# Patient Record
Sex: MALE | Race: BLACK OR AFRICAN AMERICAN | NOT HISPANIC OR LATINO | Employment: OTHER | URBAN - METROPOLITAN AREA
[De-identification: names, ages, dates, MRNs, and addresses within clinical notes are randomized per-mention and may not be internally consistent; named-entity substitution may affect disease eponyms.]

---

## 2017-01-20 ENCOUNTER — LAB VISIT (OUTPATIENT)
Dept: LAB | Facility: HOSPITAL | Age: 77
End: 2017-01-20
Attending: NUCLEAR MEDICINE
Payer: MEDICARE

## 2017-01-20 DIAGNOSIS — I50.9 CHF (NYHA CLASS III, ACC/AHA STAGE C): Chronic | ICD-10-CM

## 2017-01-20 DIAGNOSIS — D63.8 ANEMIA IN CHRONIC ILLNESS: ICD-10-CM

## 2017-01-20 DIAGNOSIS — I25.5 ISCHEMIC CARDIOMYOPATHY: ICD-10-CM

## 2017-01-20 DIAGNOSIS — D47.2 MGUS (MONOCLONAL GAMMOPATHY OF UNKNOWN SIGNIFICANCE): ICD-10-CM

## 2017-01-20 DIAGNOSIS — N18.30 ANEMIA, CHRONIC RENAL FAILURE, STAGE 3 (MODERATE): ICD-10-CM

## 2017-01-20 DIAGNOSIS — D63.1 ANEMIA, CHRONIC RENAL FAILURE, STAGE 3 (MODERATE): ICD-10-CM

## 2017-01-20 LAB
ALBUMIN SERPL BCP-MCNC: 3.9 G/DL
ALP SERPL-CCNC: 41 U/L
ALT SERPL W/O P-5'-P-CCNC: 14 U/L
ANION GAP SERPL CALC-SCNC: 10 MMOL/L
ANION GAP SERPL CALC-SCNC: 10 MMOL/L
AST SERPL-CCNC: 20 U/L
BASOPHILS # BLD AUTO: 0.01 K/UL
BASOPHILS NFR BLD: 0.2 %
BILIRUB SERPL-MCNC: 0.7 MG/DL
BUN SERPL-MCNC: 12 MG/DL
BUN SERPL-MCNC: 12 MG/DL
CALCIUM SERPL-MCNC: 9.5 MG/DL
CALCIUM SERPL-MCNC: 9.5 MG/DL
CHLORIDE SERPL-SCNC: 109 MMOL/L
CHLORIDE SERPL-SCNC: 109 MMOL/L
CO2 SERPL-SCNC: 24 MMOL/L
CO2 SERPL-SCNC: 24 MMOL/L
CREAT SERPL-MCNC: 1.2 MG/DL
CREAT SERPL-MCNC: 1.2 MG/DL
DIFFERENTIAL METHOD: ABNORMAL
EOSINOPHIL # BLD AUTO: 0.1 K/UL
EOSINOPHIL NFR BLD: 1.5 %
ERYTHROCYTE [DISTWIDTH] IN BLOOD BY AUTOMATED COUNT: 12.9 %
EST. GFR  (AFRICAN AMERICAN): >60 ML/MIN/1.73 M^2
EST. GFR  (AFRICAN AMERICAN): >60 ML/MIN/1.73 M^2
EST. GFR  (NON AFRICAN AMERICAN): 58 ML/MIN/1.73 M^2
EST. GFR  (NON AFRICAN AMERICAN): 58 ML/MIN/1.73 M^2
GLUCOSE SERPL-MCNC: 118 MG/DL
GLUCOSE SERPL-MCNC: 118 MG/DL
HCT VFR BLD AUTO: 32.2 %
HGB BLD-MCNC: 10.7 G/DL
LYMPHOCYTES # BLD AUTO: 2.1 K/UL
LYMPHOCYTES NFR BLD: 44.9 %
MCH RBC QN AUTO: 30.7 PG
MCHC RBC AUTO-ENTMCNC: 33.2 %
MCV RBC AUTO: 93 FL
MONOCYTES # BLD AUTO: 0.4 K/UL
MONOCYTES NFR BLD: 8.5 %
NEUTROPHILS # BLD AUTO: 2.1 K/UL
NEUTROPHILS NFR BLD: 44.9 %
PLATELET # BLD AUTO: 196 K/UL
PMV BLD AUTO: 8.3 FL
POTASSIUM SERPL-SCNC: 3.5 MMOL/L
POTASSIUM SERPL-SCNC: 3.5 MMOL/L
PROT SERPL-MCNC: 7.8 G/DL
RBC # BLD AUTO: 3.48 M/UL
SODIUM SERPL-SCNC: 143 MMOL/L
SODIUM SERPL-SCNC: 143 MMOL/L
WBC # BLD AUTO: 4.57 K/UL

## 2017-01-20 PROCEDURE — 84165 PROTEIN E-PHORESIS SERUM: CPT | Mod: 26,,, | Performed by: PATHOLOGY

## 2017-01-20 PROCEDURE — 86334 IMMUNOFIX E-PHORESIS SERUM: CPT | Mod: 26,,, | Performed by: PATHOLOGY

## 2017-01-20 PROCEDURE — 84165 PROTEIN E-PHORESIS SERUM: CPT

## 2017-01-20 PROCEDURE — 85025 COMPLETE CBC W/AUTO DIFF WBC: CPT

## 2017-01-20 PROCEDURE — 83520 IMMUNOASSAY QUANT NOS NONAB: CPT

## 2017-01-20 PROCEDURE — 36415 COLL VENOUS BLD VENIPUNCTURE: CPT

## 2017-01-20 PROCEDURE — 86334 IMMUNOFIX E-PHORESIS SERUM: CPT

## 2017-01-20 PROCEDURE — 80053 COMPREHEN METABOLIC PANEL: CPT

## 2017-01-23 LAB
ALBUMIN SERPL ELPH-MCNC: 3.99 G/DL
ALPHA1 GLOB SERPL ELPH-MCNC: 0.28 G/DL
ALPHA2 GLOB SERPL ELPH-MCNC: 0.71 G/DL
B-GLOBULIN SERPL ELPH-MCNC: 0.73 G/DL
GAMMA GLOB SERPL ELPH-MCNC: 1.8 G/DL
INTERPRETATION SERPL IFE-IMP: NORMAL
KAPPA LC SER QL IA: 2.33 MG/DL
KAPPA LC/LAMBDA SER IA: 0.62
LAMBDA LC SER QL IA: 3.74 MG/DL
PATHOLOGIST INTERPRETATION IFE: NORMAL
PATHOLOGIST INTERPRETATION SPE: NORMAL
PROT SERPL-MCNC: 7.5 G/DL

## 2017-01-26 ENCOUNTER — OFFICE VISIT (OUTPATIENT)
Dept: HEMATOLOGY/ONCOLOGY | Facility: CLINIC | Age: 77
End: 2017-01-26
Payer: MEDICARE

## 2017-01-26 VITALS
WEIGHT: 200.63 LBS | TEMPERATURE: 96 F | OXYGEN SATURATION: 90 % | HEIGHT: 67 IN | DIASTOLIC BLOOD PRESSURE: 82 MMHG | RESPIRATION RATE: 20 BRPM | BODY MASS INDEX: 31.49 KG/M2 | SYSTOLIC BLOOD PRESSURE: 138 MMHG | HEART RATE: 67 BPM

## 2017-01-26 DIAGNOSIS — D63.8 ANEMIA IN CHRONIC ILLNESS: ICD-10-CM

## 2017-01-26 DIAGNOSIS — D47.2 MGUS (MONOCLONAL GAMMOPATHY OF UNKNOWN SIGNIFICANCE): Primary | ICD-10-CM

## 2017-01-26 PROCEDURE — 99214 OFFICE O/P EST MOD 30 MIN: CPT | Mod: S$PBB,,, | Performed by: INTERNAL MEDICINE

## 2017-01-26 PROCEDURE — 99999 PR PBB SHADOW E&M-EST. PATIENT-LVL III: CPT | Mod: PBBFAC,,, | Performed by: INTERNAL MEDICINE

## 2017-01-26 PROCEDURE — 99213 OFFICE O/P EST LOW 20 MIN: CPT | Mod: PBBFAC | Performed by: INTERNAL MEDICINE

## 2017-01-26 NOTE — PROGRESS NOTES
Reason for visit: Follow up for anemia/MGUS    HPI:   The patient is a 76-year-old  male who presents to the hematology oncology clinic today for follow up of his chronic anemia.  Today the patient reports that overall he feels ok except for his chronic arthritic pain in his lower back which bothers him from time to time. He denies any chest pain or shortness of breath. He denies any bowel or urinary complaints. He denies any fever, chills or night sweats. He denies any loss of appetite or unintentional weight loss. He denies any melena, hematochezia, hematemesis, hemoptysis or hematuria. He has no other specific complaints today.   I have reviewed all of the patient's interval clinical history available in EPIC and have utilized this in my evaluation and management recommendations today.    PAST MEDICAL HISTORY:   1. Glaucoma  2. Asthma  3. Prostate adenocarcinoma diagnosed in 2001.   4. Erectile dysfunction  5. L5-S1 radiculopathy  6. SI joint dysfunction  7. L3-L4 disc herniation  8. Osteoarthritis  9. Nonischemic cardiomyopathy  10. Nonobstructive coronary artery disease    SURGICAL HISTORY:   1. Cholecystectomy  2. Appendectomy  3. Radical prostatectomy  4. Right total hip replacement in 2008  5. AICD placement in oct 2012    FAMILY HISTORY: The patient's mother  from complications related to an unknown type of cancer at the age of 86. She was diagnosed at the age of 84. He denies any other immediate family members with cancer or bleeding/clotting disorders.    SOCIAL HISTORY: He reports that he was a light smoker and quit more than 26 years ago. He reports that he was a heavy drinker of alcohol for about 10-15 years and quit more than 27 years ago. He denies using any recreational drugs. He retired in . He is . He lives in [place], [place]. He has one daughter and she lives in [place].    ALLERGIES: Reviewed on medication card.    MEDICATIONS: [Medcard  has been reviewed and/or reconciled.]    REVIEW OF SYSTEMS:   GENERAL: [No fevers, chills or sweats. No weight loss or loss of appetite.] He does report chronic fatigue.  HEENT: [No blurred vision, tinnitus, nasal discharge, sorethroat or dysphagia.]  HEART: [No chest pain, palpitations or shortness of breath.]   LUNGS: [No cough, hemoptysis or breathing problems.]  ABDOMEN: [No abdominal pain, nausea, vomiting, diarrhea, constipation or melena.]  GENITOURINARY: [No dysuria, bleeding or malodorous discharge.]  NEURO: [No headache, dizziness or vertigo.]  HEMATOLOGY: [No easy bruising, spontaneous bleeding or blood clots in the past].  MUSCULOSKELETAL: [No myalgias or bone pains.] He does have a history of osteoarthritis.  SKIN: [No rashes or skin lesions.]  PSYCHIATRY: [No depression or anxiety.]    PHYSICAL EXAMINATION:   VS: Reviewed on nurse's notes.  APPEARANCE: The patient is a well-developed, well-nourished and well-groomed after American male who appears in no acute distress. He uses a cane for assistance with ambulation.   HEENT: No scleral icterus. Both external auditory canals clear. No oral ulcers, lesions. Throat clear  HEAD: No sinus tenderness. Possible sebaceous cyst on right frontal scalp.  NECK: Supple. No palpable lymphadenopathy. Thyroid non-tender, no palpable masses  CHEST: Breath sounds clear bilaterally. No rales. No rhonchi. Unlabored respirations.  CARDIOVASCULAR: Normal S1, S2. Normal rate. Regular rhythm. Grade 2/6 systolic ejection murmur is noted.  ABDOMEN: Bowel sounds normal. No tenderness. No abdominal distention. No hepatomegaly. No splenomegaly.  LYMPHATIC: No palpable supraclavicular, axillary nodes  EXTREMITIES: No clubbing, cyanosis, edema  SKIN: No lesions. No petechiae. No ecchymoses. No induration or nodules  NEUROLOGIC: No focal findings. Alert & Oriented x 3. Mood appropriate to affect    LABS:   Reviewed    IMAGING:  Reviewed    IMPRESSION:  1. Normocytic anemia  2.  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 Chronic low back pain    PLAN:  1. I had a detailed discussion with the patient today with regard to the results of his recent labwork. This shows stable Hb/Hct. Paraprotein levels are stable. He did have a bone marrow biopsy in sep 2014. There was no evidence of multiple myeloma/plasma cell dyscrasis. There was no evidence of MDS/leukemia or lymphoma  2. At this time we will continue with conservative management with close follow up with regard to his anemia and MGUS  3. He will be schedule to see his primary care physician for all routine health care maintenance including discussing screening colonoscopy.    Follow up in 6 months with labs 1 week before visit. He knows to call sooner for any new problems or questions.    Víctor La MD

## 2017-02-06 ENCOUNTER — OFFICE VISIT (OUTPATIENT)
Dept: INTERNAL MEDICINE | Facility: CLINIC | Age: 77
End: 2017-02-06
Payer: MEDICARE

## 2017-02-06 VITALS
DIASTOLIC BLOOD PRESSURE: 76 MMHG | SYSTOLIC BLOOD PRESSURE: 132 MMHG | BODY MASS INDEX: 32.01 KG/M2 | TEMPERATURE: 99 F | HEIGHT: 67 IN | WEIGHT: 203.94 LBS | HEART RATE: 84 BPM | OXYGEN SATURATION: 97 %

## 2017-02-06 DIAGNOSIS — I10 ESSENTIAL HYPERTENSION: ICD-10-CM

## 2017-02-06 DIAGNOSIS — I50.9 CHF (NYHA CLASS III, ACC/AHA STAGE C): Chronic | ICD-10-CM

## 2017-02-06 DIAGNOSIS — H40.9 GLAUCOMA, UNSPECIFIED GLAUCOMA, UNSPECIFIED LATERALITY: ICD-10-CM

## 2017-02-06 DIAGNOSIS — Z00.00 ROUTINE GENERAL MEDICAL EXAMINATION AT A HEALTH CARE FACILITY: Primary | ICD-10-CM

## 2017-02-06 DIAGNOSIS — R73.9 HYPERGLYCEMIA: ICD-10-CM

## 2017-02-06 DIAGNOSIS — J06.9 ACUTE URI: ICD-10-CM

## 2017-02-06 DIAGNOSIS — D47.2 MGUS (MONOCLONAL GAMMOPATHY OF UNKNOWN SIGNIFICANCE): ICD-10-CM

## 2017-02-06 DIAGNOSIS — E78.5 HYPERLIPIDEMIA, UNSPECIFIED HYPERLIPIDEMIA TYPE: ICD-10-CM

## 2017-02-06 DIAGNOSIS — D63.8 ANEMIA IN CHRONIC ILLNESS: ICD-10-CM

## 2017-02-06 PROCEDURE — 99999 PR PBB SHADOW E&M-EST. PATIENT-LVL III: CPT | Mod: PBBFAC,,, | Performed by: FAMILY MEDICINE

## 2017-02-06 PROCEDURE — 99213 OFFICE O/P EST LOW 20 MIN: CPT | Mod: PBBFAC | Performed by: FAMILY MEDICINE

## 2017-02-06 PROCEDURE — 99214 OFFICE O/P EST MOD 30 MIN: CPT | Mod: S$PBB,,, | Performed by: FAMILY MEDICINE

## 2017-02-06 NOTE — PROGRESS NOTES
Subjective:       Patient ID: Curtis Landry is a 76 y.o. male.    Chief Complaint: Annual Exam    HPI Comments: Patient presents to clinic today for annual physical exam/f/u chronic conditions. Patient is otherwise without concerns today.      Review of Systems   Constitutional: Negative for chills, fatigue, fever and unexpected weight change.   HENT: Positive for dental problem (loose dentures). Negative for congestion, ear pain, hearing loss, rhinorrhea and trouble swallowing.    Eyes: Positive for visual disturbance (chronic, stable). Negative for pain.   Respiratory: Positive for shortness of breath (with exertion, chronic, stable, reports Cardiology aware). Negative for cough.    Cardiovascular: Negative for chest pain, palpitations and leg swelling.   Gastrointestinal: Negative for abdominal distention, abdominal pain, blood in stool, constipation, diarrhea, nausea and vomiting.   Genitourinary: Negative for difficulty urinating, scrotal swelling and testicular pain.   Musculoskeletal: Negative for arthralgias and myalgias.   Skin: Negative for rash.   Neurological: Negative for dizziness, weakness, numbness and headaches.   Hematological: Negative for adenopathy. Does not bruise/bleed easily.   Psychiatric/Behavioral: Negative for dysphoric mood and sleep disturbance. The patient is not nervous/anxious.        Objective:      Physical Exam   Constitutional: He is oriented to person, place, and time. He appears well-developed and well-nourished. No distress.   HENT:   Head: Normocephalic and atraumatic.   Right Ear: Tympanic membrane normal.   Left Ear: Tympanic membrane normal.   Nose: Mucosal edema and rhinorrhea present.   Mouth/Throat: Uvula is midline, oropharynx is clear and moist and mucous membranes are normal.   Eyes: Conjunctivae and EOM are normal. Pupils are equal, round, and reactive to light. No scleral icterus.   Cardiovascular: Normal rate and regular rhythm.  Exam reveals no gallop and no  friction rub.    No murmur heard.  Pulmonary/Chest: Effort normal and breath sounds normal.   Musculoskeletal: He exhibits no edema.   Neurological: He is alert and oriented to person, place, and time. No cranial nerve deficit. Gait normal.   Psychiatric: He has a normal mood and affect.   Vitals reviewed.      Assessment:       1. Routine general medical examination at a health care facility    2. Essential hypertension    3. Hyperlipidemia, unspecified hyperlipidemia type    4. CHF (NYHA class III, ACC/AHA stage C)    5. Anemia in chronic illness    6. MGUS (monoclonal gammopathy of unknown significance)    7. Glaucoma, unspecified glaucoma, unspecified laterality    8. Hyperglycemia    9. Acute URI        Plan:   Curtis GRAVES was seen today for annual exam.    Diagnoses and all orders for this visit:    Routine general medical examination at a health care facility    Essential hypertension  Comments:  controlled on current meds  Orders:  -     TSH; Future    Hyperlipidemia, unspecified hyperlipidemia type  Comments:  status pending labs  Orders:  -     Cancel: Comprehensive metabolic panel; Future  -     Lipid panel; Future    CHF (NYHA class III, ACC/AHA stage C)  Comments:  followed by Dr. Boyd, Cardiology    Anemia in chronic illness  Comments:  followed by Dr. La, Hem/Onc    MGUS (monoclonal gammopathy of unknown significance)  Comments:  followed by Dr. La, Hem/Onc    Glaucoma, unspecified glaucoma, unspecified laterality  Comments:  followed by outside ophthalmology    Hyperglycemia  Comments:  check a1c to rule out diabetes  Orders:  -     Hemoglobin A1c; Future    Acute URI  Comments:  symptoms x 1 day, no fever/chills, f/u if worse/persistent      - Health Maintenance reviewed/updated. Advised he can obtain Tdap and flu at pharmacy when his cold resolves. Patient expressed understanding.

## 2017-02-10 ENCOUNTER — HOSPITAL ENCOUNTER (EMERGENCY)
Facility: HOSPITAL | Age: 77
Discharge: HOME | End: 2017-02-10
Attending: EMERGENCY MEDICINE
Payer: MEDICARE

## 2017-02-10 VITALS
RESPIRATION RATE: 18 BRPM | HEIGHT: 65 IN | SYSTOLIC BLOOD PRESSURE: 162 MMHG | TEMPERATURE: 98 F | WEIGHT: 200 LBS | BODY MASS INDEX: 33.32 KG/M2 | DIASTOLIC BLOOD PRESSURE: 83 MMHG | OXYGEN SATURATION: 98 % | HEART RATE: 76 BPM

## 2017-02-10 DIAGNOSIS — D63.8 ANEMIA IN CHRONIC ILLNESS: ICD-10-CM

## 2017-02-10 DIAGNOSIS — I50.9 CHF (NYHA CLASS III, ACC/AHA STAGE C): Primary | Chronic | ICD-10-CM

## 2017-02-10 DIAGNOSIS — J20.9 ACUTE BRONCHITIS, UNSPECIFIED ORGANISM: ICD-10-CM

## 2017-02-10 DIAGNOSIS — I25.5 ISCHEMIC CARDIOMYOPATHY: ICD-10-CM

## 2017-02-10 LAB
ALBUMIN SERPL BCP-MCNC: 3.9 G/DL
ALP SERPL-CCNC: 46 U/L
ALT SERPL W/O P-5'-P-CCNC: 21 U/L
ANION GAP SERPL CALC-SCNC: 10 MMOL/L
AST SERPL-CCNC: 28 U/L
BASOPHILS # BLD AUTO: 0.03 K/UL
BASOPHILS NFR BLD: 0.7 %
BILIRUB SERPL-MCNC: 0.6 MG/DL
BNP SERPL-MCNC: 31 PG/ML
BUN SERPL-MCNC: 11 MG/DL
CALCIUM SERPL-MCNC: 10.1 MG/DL
CHLORIDE SERPL-SCNC: 105 MMOL/L
CO2 SERPL-SCNC: 25 MMOL/L
CREAT SERPL-MCNC: 1.1 MG/DL
DIFFERENTIAL METHOD: ABNORMAL
EOSINOPHIL # BLD AUTO: 0.1 K/UL
EOSINOPHIL NFR BLD: 2.6 %
ERYTHROCYTE [DISTWIDTH] IN BLOOD BY AUTOMATED COUNT: 12.8 %
EST. GFR  (AFRICAN AMERICAN): >60 ML/MIN/1.73 M^2
EST. GFR  (NON AFRICAN AMERICAN): >60 ML/MIN/1.73 M^2
FLUAV AG SPEC QL IA: NEGATIVE
FLUBV AG SPEC QL IA: NEGATIVE
GLUCOSE SERPL-MCNC: 95 MG/DL
HCT VFR BLD AUTO: 32.8 %
HGB BLD-MCNC: 11.2 G/DL
LYMPHOCYTES # BLD AUTO: 2.3 K/UL
LYMPHOCYTES NFR BLD: 53.8 %
MCH RBC QN AUTO: 30.6 PG
MCHC RBC AUTO-ENTMCNC: 34.1 %
MCV RBC AUTO: 90 FL
MONOCYTES # BLD AUTO: 0.5 K/UL
MONOCYTES NFR BLD: 12 %
NEUTROPHILS # BLD AUTO: 1.3 K/UL
NEUTROPHILS NFR BLD: 30.9 %
PLATELET # BLD AUTO: 169 K/UL
PMV BLD AUTO: 8.3 FL
POTASSIUM SERPL-SCNC: 4 MMOL/L
PROT SERPL-MCNC: 8.5 G/DL
RBC # BLD AUTO: 3.66 M/UL
SODIUM SERPL-SCNC: 140 MMOL/L
SPECIMEN SOURCE: NORMAL
TROPONIN I SERPL DL<=0.01 NG/ML-MCNC: 0.01 NG/ML
WBC # BLD AUTO: 4.26 K/UL

## 2017-02-10 PROCEDURE — 93010 ELECTROCARDIOGRAM REPORT: CPT | Mod: ,,, | Performed by: INTERNAL MEDICINE

## 2017-02-10 PROCEDURE — 87400 INFLUENZA A/B EACH AG IA: CPT | Mod: 59

## 2017-02-10 PROCEDURE — 93005 ELECTROCARDIOGRAM TRACING: CPT

## 2017-02-10 PROCEDURE — 63600175 PHARM REV CODE 636 W HCPCS: Performed by: EMERGENCY MEDICINE

## 2017-02-10 PROCEDURE — 84484 ASSAY OF TROPONIN QUANT: CPT

## 2017-02-10 PROCEDURE — 85025 COMPLETE CBC W/AUTO DIFF WBC: CPT

## 2017-02-10 PROCEDURE — 80053 COMPREHEN METABOLIC PANEL: CPT

## 2017-02-10 PROCEDURE — 83880 ASSAY OF NATRIURETIC PEPTIDE: CPT

## 2017-02-10 PROCEDURE — 96374 THER/PROPH/DIAG INJ IV PUSH: CPT

## 2017-02-10 PROCEDURE — 99284 EMERGENCY DEPT VISIT MOD MDM: CPT | Mod: 25

## 2017-02-10 PROCEDURE — 25000003 PHARM REV CODE 250: Performed by: EMERGENCY MEDICINE

## 2017-02-10 RX ORDER — METHYLPREDNISOLONE SOD SUCC 125 MG
125 VIAL (EA) INJECTION
Status: COMPLETED | OUTPATIENT
Start: 2017-02-10 | End: 2017-02-10

## 2017-02-10 RX ORDER — DOXYCYCLINE HYCLATE 100 MG
100 TABLET ORAL 2 TIMES DAILY
Qty: 20 TABLET | Refills: 0 | Status: SHIPPED | OUTPATIENT
Start: 2017-02-10 | End: 2017-06-30

## 2017-02-10 RX ORDER — FUROSEMIDE 10 MG/ML
20 INJECTION INTRAMUSCULAR; INTRAVENOUS
Status: DISCONTINUED | OUTPATIENT
Start: 2017-02-10 | End: 2017-02-10

## 2017-02-10 RX ORDER — BENZONATATE 100 MG/1
100 CAPSULE ORAL 3 TIMES DAILY PRN
Qty: 20 CAPSULE | Refills: 0 | Status: SHIPPED | OUTPATIENT
Start: 2017-02-10 | End: 2017-02-20

## 2017-02-10 RX ORDER — FUROSEMIDE 40 MG/1
40 TABLET ORAL
Status: COMPLETED | OUTPATIENT
Start: 2017-02-10 | End: 2017-02-10

## 2017-02-10 RX ORDER — BENZONATATE 100 MG/1
100 CAPSULE ORAL 3 TIMES DAILY PRN
Qty: 20 CAPSULE | Refills: 0 | Status: SHIPPED | OUTPATIENT
Start: 2017-02-10 | End: 2017-02-14 | Stop reason: SDUPTHER

## 2017-02-10 RX ORDER — METHYLPREDNISOLONE 4 MG/1
TABLET ORAL
Qty: 1 PACKAGE | Refills: 0 | Status: SHIPPED | OUTPATIENT
Start: 2017-02-10 | End: 2017-03-03

## 2017-02-10 RX ADMIN — METHYLPREDNISOLONE SODIUM SUCCINATE 125 MG: 125 INJECTION, POWDER, FOR SOLUTION INTRAMUSCULAR; INTRAVENOUS at 12:02

## 2017-02-10 RX ADMIN — FUROSEMIDE 40 MG: 40 TABLET ORAL at 02:02

## 2017-02-10 NOTE — ED PROVIDER NOTES
SCRIBE #1 NOTE: I, Staci De Pazlonnie, am scribing for, and in the presence of, Alessandro Coffey MD. I have scribed the entire note.      History      Chief Complaint   Patient presents with    Chest Pain     started past week. Reports slight SOB with cough       Review of patient's allergies indicates:   Allergen Reactions    No known drug allergies         HPI   HPI    2/10/2017, 11:36 AM   History obtained from the patient      History of Present Illness: Curtis Landry is a 76 y.o. male patient with a HX of CHF who presents to the Emergency Department for SOB which onset gradually 3 days ago. Pt reports having a cold onset 3 days ago. Symptoms are constant and moderate in severity. No mitigating or exacerbating factors reported. Associated sxs include cough, and abdominal pain (secondary to cough). Pt rates abd pain an 8/10 in severity when coughing. Patient denies any recent travel, long car trips, fever, CP, nausea, emesis, extremity weakness/numbness, leg pain/swelling, and all other sxs at this time. Pt denies Hx of diabetes. No further complaints or concerns at this time.         Arrival mode: Personal vehicle      PCP: Liz Hall MD       Past Medical History:  Past Medical History   Diagnosis Date    Arthritis     CHF (congestive heart failure)     Glaucoma (increased eye pressure)      Followed by outside opthalmology    HTN (hypertension)     Hyperlipidemia     Prostate cancer        Past Surgical History:  Past Surgical History   Procedure Laterality Date    Gallbladder surgery      Total hip arthroplasty      Cardiac pacemaker placement      Cardiac defibrillator placement      Prostatectomy      Appendectomy      Cardiac pacemaker placement           Family History:  Family History   Problem Relation Age of Onset    Cancer Mother        Social History:  Social History     Social History Main Topics    Smoking status: Former Smoker     Years: 20.00     Quit date: 1/1/1980     Smokeless tobacco: Never Used    Alcohol use No    Drug use: No    Sexual activity: Unknown       ROS   Review of Systems   Constitutional: Negative for fever.        (-) recent travel  (-) long car trips   HENT: Negative for sore throat.    Respiratory: Positive for cough and shortness of breath.    Cardiovascular: Negative for chest pain and leg swelling.   Gastrointestinal: Positive for abdominal pain. Negative for nausea and vomiting.   Genitourinary: Negative for dysuria.   Musculoskeletal: Negative for back pain.   Skin: Negative for rash.   Neurological: Negative for weakness and numbness.   Hematological: Does not bruise/bleed easily.   All other systems reviewed and are negative.      Physical Exam    Initial Vitals   BP Pulse Resp Temp SpO2   02/10/17 1003 02/10/17 1003 02/10/17 1003 02/10/17 1003 02/10/17 1003   174/78 78 18 98 °F (36.7 °C) 95 %      Physical Exam  Nursing Notes and Vital Signs Reviewed.  Constitutional: Patient is in no apparent distress. Awake and alert. Well-developed and well-nourished.  Head: Atraumatic. Normocephalic.  Eyes: PERRL. EOM intact. Conjunctivae are not pale. No scleral icterus.  ENT: Mucous membranes are moist. Oropharynx is clear and symmetric.    Neck: Supple. Full ROM. No lymphadenopathy.  Cardiovascular: Regular rate. Regular rhythm. No murmurs, rubs, or gallops. Distal pulses are 2+ and symmetric.  Pulmonary/Chest: No respiratory distress. No wheezing, or rales. Diffuse rhonchi. Significant crackles bilaterally skilled nursing up.  Abdominal: Soft and non-distended.  There is no tenderness.  No rebound, guarding, or rigidity. Good bowel sounds.  Genitourinary: No CVA tenderness  Musculoskeletal: Moves all extremities. No obvious deformities. No edema. No calf tenderness.  Skin: Warm and dry.  Neurological:  Alert, awake, and appropriate.  Normal speech.  No acute focal neurological deficits are appreciated.  Psychiatric: Normal affect. Good eye contact. Appropriate in  "content.      ED Course    Procedures  ED Vital Signs:  Vitals:    02/10/17 1003 02/10/17 1202 02/10/17 1217 02/10/17 1304   BP: (!) 174/78 (!) 157/81 (!) 151/84 (!) 155/87   Pulse: 78 70 68 69   Resp: 18 (!) 21 20 19   Temp: 98 °F (36.7 °C)      TempSrc: Oral      SpO2: 95% 99% 99% 99%   Weight: 90.7 kg (200 lb)      Height: 5' 5" (1.651 m)       02/10/17 1332 02/10/17 1347   BP: (!) 161/93 (!) 162/83   Pulse: 69 76   Resp: 17 18   Temp:     TempSrc:     SpO2: 99% 98%   Weight:     Height:         Abnormal Lab Results:  Labs Reviewed   CBC W/ AUTO DIFFERENTIAL - Abnormal; Notable for the following:        Result Value    RBC 3.66 (*)     Hemoglobin 11.2 (*)     Hematocrit 32.8 (*)     MPV 8.3 (*)     Gran # 1.3 (*)     Gran% 30.9 (*)     Lymph% 53.8 (*)     All other components within normal limits   COMPREHENSIVE METABOLIC PANEL - Abnormal; Notable for the following:     Total Protein 8.5 (*)     Alkaline Phosphatase 46 (*)     All other components within normal limits   INFLUENZA A AND B ANTIGEN   B-TYPE NATRIURETIC PEPTIDE   TROPONIN I        All Lab Results:  Results for orders placed or performed during the hospital encounter of 02/10/17   CBC auto differential   Result Value Ref Range    WBC 4.26 3.90 - 12.70 K/uL    RBC 3.66 (L) 4.60 - 6.20 M/uL    Hemoglobin 11.2 (L) 14.0 - 18.0 g/dL    Hematocrit 32.8 (L) 40.0 - 54.0 %    MCV 90 82 - 98 fL    MCH 30.6 27.0 - 31.0 pg    MCHC 34.1 32.0 - 36.0 %    RDW 12.8 11.5 - 14.5 %    Platelets 169 150 - 350 K/uL    MPV 8.3 (L) 9.2 - 12.9 fL    Gran # 1.3 (L) 1.8 - 7.7 K/uL    Lymph # 2.3 1.0 - 4.8 K/uL    Mono # 0.5 0.3 - 1.0 K/uL    Eos # 0.1 0.0 - 0.5 K/uL    Baso # 0.03 0.00 - 0.20 K/uL    Gran% 30.9 (L) 38.0 - 73.0 %    Lymph% 53.8 (H) 18.0 - 48.0 %    Mono% 12.0 4.0 - 15.0 %    Eosinophil% 2.6 0.0 - 8.0 %    Basophil% 0.7 0.0 - 1.9 %    Differential Method Automated    Comprehensive metabolic panel   Result Value Ref Range    Sodium 140 136 - 145 mmol/L    " Potassium 4.0 3.5 - 5.1 mmol/L    Chloride 105 95 - 110 mmol/L    CO2 25 23 - 29 mmol/L    Glucose 95 70 - 110 mg/dL    BUN, Bld 11 8 - 23 mg/dL    Creatinine 1.1 0.5 - 1.4 mg/dL    Calcium 10.1 8.7 - 10.5 mg/dL    Total Protein 8.5 (H) 6.0 - 8.4 g/dL    Albumin 3.9 3.5 - 5.2 g/dL    Total Bilirubin 0.6 0.1 - 1.0 mg/dL    Alkaline Phosphatase 46 (L) 55 - 135 U/L    AST 28 10 - 40 U/L    ALT 21 10 - 44 U/L    Anion Gap 10 8 - 16 mmol/L    eGFR if African American >60 >60 mL/min/1.73 m^2    eGFR if non African American >60 >60 mL/min/1.73 m^2   Influenza antigen Nasopharyngeal Swab   Result Value Ref Range    Influenza A Ag, EIA Negative Negative    Influenza B Ag, EIA Negative Negative    Flu A & B Source Nasopharyngeal Swab    B-Type natriuretic peptide (BNP)   Result Value Ref Range    BNP 31 0 - 99 pg/mL   Troponin I   Result Value Ref Range    Troponin I 0.012 0.000 - 0.026 ng/mL         Imaging Results:  Imaging Results         X-Ray Chest PA And Lateral (Final result) Result time:  02/10/17 12:44:00    Final result by MONSE Escobar Sr., MD (02/10/17 12:44:00)    Impression:        1.  The lungs are clear.   2.  There is mild cardiomegaly.    3.  Surgical changes.      Electronically signed by: MONSE ESCOBAR MD  Date:     02/10/17  Time:    12:43     Narrative:    Two-view chest x-ray    Clinical History:  Chest pain    Finding: Comparison was made to a prior examination performed on 9/21/2016.  A cardiac pacemaker remains in place.  The leads appear to be intact.  There is mild cardiomegaly.  The lungs are clear. There is no pneumothorax or pleural effusion.  There is a surgical clip in the right upper quadrant of the abdomen.             The EKG was ordered, reviewed, and independently interpreted by the ED provider.  Interpretation time: 10:08  Rate: 76 BPM  Rhythm: Sinus rhythm with short AZ  Interpretation: Left axis deviation. Inferior infarct. No STEMI.           The Emergency Provider reviewed the vital  signs and test results, which are outlined above.    ED Discussion   2:03PM: Pt is feeling better at this time.  He has a significant history of ischemic cardiomyopathy and does have bilateral crackles at this time.  He complained on a preceding URI and now has worsening SOB over the last few days.  No fever measured at home.  CXR does not show an acute infiltrate.  He is stable to discharge to home    Pre-hypertension/Hypertension: The pt has been informed that they may have pre-hypertension or hypertension based on a blood pressure reading in the ED. I recommend that the pt call the PCP listed on their discharge instructions or a physician of their choice this week to arrange f/u for further evaluation of possible pre-hypertension or hypertension.     I have discussed with patient and/or family/caretaker chest pain precautions, specifically to return for worsening chest pain, shortness of breath, fever, or any concern.  I have low suspicion for cardiopulmonary, vascular, infectious, respiratory, or other emergent medical condition based on my evaluation in the ED.    ED Medication(s):  Medications   methylPREDNISolone sodium succinate injection 125 mg (125 mg Intravenous Given 2/10/17 1207)   furosemide tablet 40 mg (40 mg Oral Given 2/10/17 1416)       Discharge Medication List as of 2/10/2017  1:46 PM      START taking these medications    Details   !! benzonatate (TESSALON) 100 MG capsule Take 1 capsule (100 mg total) by mouth 3 (three) times daily as needed for Cough., Starting 2/10/2017, Until Mon 2/20/17, Normal      doxycycline (VIBRA-TABS) 100 MG tablet Take 1 tablet (100 mg total) by mouth 2 (two) times daily., Starting 2/10/2017, Until Discontinued, Print      methylPREDNISolone (MEDROL DOSEPACK) 4 mg tablet AS DIRECTED ON PACKAGING, Print      !! benzonatate (TESSALON) 100 MG capsule Take 1 capsule (100 mg total) by mouth 3 (three) times daily as needed for Cough., Starting 2/10/2017, Until Mon 2/20/17,  Print       !! - Potential duplicate medications found. Please discuss with provider.          Follow-up Information     Follow up with Liz Hall MD In 1 day.    Specialty:  Family Medicine    Contact information:    [address] [place] [address] DR Thony MORRIS [id number] [phone]              Medical Decision Making    Medical Decision Making:   Clinical Tests:   Lab Tests: Ordered and Reviewed  Radiological Study: Ordered and Reviewed  Medical Tests: Ordered and Reviewed           Scribe Attestation:   Scribe #1: I performed the above scribed service and the documentation accurately describes the services I performed. I attest to the accuracy of the note.    Attending:   Physician Attestation Statement for Scribe #1: I, Alessandro Coffey MD, personally performed the services described in this documentation, as scribed by Staci Marroquin, in my presence, and it is both accurate and complete.          Clinical Impression       ICD-10-CM ICD-9-CM   1. CHF (NYHA class III, ACC/AHA stage C) I50.9 428.0   2. Ischemic cardiomyopathy I25.5 414.8   3. Anemia in chronic illness D63.8 285.29   4. Acute bronchitis, unspecified organism J20.9 466.0       Disposition:   Disposition: Discharged  Condition: Stable         Alessandro Coffey MD  02/14/17 2147       Alessandro Coffey MD  02/14/17 2148

## 2017-02-10 NOTE — ED AVS SNAPSHOT
[facility] -   [address]  [place] [address]               Curtis Landry   2/10/2017 11:32 AM   ED    Description:  Male : 1940   Department:  [facility] -            Your Care was Coordinated By:     Provider Role From To    Justice Solorzano MD Attending Provider 02/10/17 1132 02/10/17 1132    Alessandro Coffey MD Attending Provider 02/10/17 1135 --      Reason for Visit     Chest Pain           Diagnoses this Visit        Comments    CHF (NYHA class III, ACC/AHA stage C)    -  Primary     Ischemic cardiomyopathy         Anemia in chronic illness         Acute bronchitis, unspecified organism           ED Disposition     None           To Do List           Follow-up Information     Follow up with Liz Hall MD In 1 day.    Specialty:  Family Medicine    Contact information:    [address] [place] [address] DR Thony MORRIS [address]  [phone]         These Medications        Disp Refills Start End    doxycycline (VIBRA-TABS) 100 MG tablet 20 tablet 0 2/10/2017     Take 1 tablet (100 mg total) by mouth 2 (two) times daily. - Oral    Pharmacy: [facility]/pharmacy #[facility] - [place], [place] - [address] AT [facility] Ph #: [phone]       methylPREDNISolone (MEDROL DOSEPACK) 4 mg tablet 1 Package 0 2/10/2017 3/3/2017    AS DIRECTED ON PACKAGING    Pharmacy: [facility]/pharmacy #[facility] [place] - [address] AT [facility] Ph #: [phone]       benzonatate (TESSALON) 100 MG capsule 20 capsule 0 2/10/2017 2017    Take 1 capsule (100 mg total) by mouth 3 (three) times daily as needed for Cough. - Oral    Pharmacy: [facility]/pharmacy #[facility] [place] - [address] AT [facility] Ph #: [phone]       benzonatate (TESSALON) 100 MG capsule 20 capsule 0 2/10/2017 2017    Take 1 capsule (100 mg total) by mouth 3 (three) times daily as needed for  Cough. - Oral    Pharmacy: [facility]/pharmacy #[id number] - [place], [place] - [address] AT [facility] Ph #: [phone]         [facility] On Call     [facility] On Call Nurse Care Line - 24/7 Assistance  Registered nurses in the [facility] On Call Center provide clinical advisement, health education, appointment booking, and other advisory services.  Call for this free service at [phone].             Medications           Message regarding Medications     Verify the changes and/or additions to your medication regime listed below are the same as discussed with your clinician today.  If any of these changes or additions are incorrect, please notify your healthcare provider.        START taking these NEW medications        Refills    doxycycline (VIBRA-TABS) 100 MG tablet 0    Sig: Take 1 tablet (100 mg total) by mouth 2 (two) times daily.    Class: Print    Route: Oral    methylPREDNISolone (MEDROL DOSEPACK) 4 mg tablet 0    Sig: AS DIRECTED ON PACKAGING    Class: Print    benzonatate (TESSALON) 100 MG capsule 0    Sig: Take 1 capsule (100 mg total) by mouth 3 (three) times daily as needed for Cough.    Class: Normal    Route: Oral    benzonatate (TESSALON) 100 MG capsule 0    Sig: Take 1 capsule (100 mg total) by mouth 3 (three) times daily as needed for Cough.    Class: Print    Route: Oral      These medications were administered today        Dose Freq    methylPREDNISolone sodium succinate injection 125 mg 125 mg ED 1 Time    Sig: Inject 125 mg into the vein ED 1 Time.    Class: Normal    Route: Intravenous    furosemide injection 20 mg 20 mg ED 1 Time    Sig: Inject 2 mLs (20 mg total) into the vein ED 1 Time.    Class: Normal    Route: Intravenous           Verify that the below list of medications is an accurate representation of the medications you are currently taking.  If none reported, the list may be blank. If incorrect, please contact your healthcare provider. Carry this list with you  "in case of emergency.           Current Medications     acetaminophen (TYLENOL) 650 MG TbSR Take 1 tablet (650 mg total) by mouth every 8 (eight) hours.    aspirin (ECOTRIN) 81 MG EC tablet Take by mouth. 1 Tablet, Delayed Release (E.C.) Oral Every day    benzonatate (TESSALON) 100 MG capsule Take 1 capsule (100 mg total) by mouth 3 (three) times daily as needed for Cough.    benzonatate (TESSALON) 100 MG capsule Take 1 capsule (100 mg total) by mouth 3 (three) times daily as needed for Cough.    calcium carbonate-vitamin D3 (CALCIUM 500 WITH D) 500 mg(1,250mg) -400 unit Tab Take by mouth.    carvedilol (COREG) 25 MG tablet TAKE 1 TABLET (25 MG TOTAL) BY MOUTH 2 (TWO) TIMES DAILY.    COD LIVER OIL ORAL Take by mouth.    cranberry 500 mg Cap Take 1 capsule by mouth daily as needed.    cyanocobalamin (VITAMIN B-12) 1000 MCG tablet Take by mouth. 1 Tablet Oral Every day    doxycycline (VIBRA-TABS) 100 MG tablet Take 1 tablet (100 mg total) by mouth 2 (two) times daily.    furosemide injection 20 mg Inject 2 mLs (20 mg total) into the vein ED 1 Time.    lisinopril (PRINIVIL,ZESTRIL) 5 MG tablet TAKE 1 TABLET (5 MG TOTAL) BY MOUTH ONCE DAILY.    methylPREDNISolone (MEDROL DOSEPACK) 4 mg tablet AS DIRECTED ON PACKAGING    MULTIVITS,TH W-FE,OTHER MIN (COMPLETE MULTIVITAMIN ORAL) Take 1 tablet by mouth once daily.    nitroGLYCERIN (NITROSTAT) 0.4 MG SL tablet Place 1 tablet (0.4 mg total) under the tongue every 5 (five) minutes as needed for Chest pain.    simvastatin (ZOCOR) 40 MG tablet TAKE 1 TABLET BY MOUTH AT BEDTIME    travoprost (TRAVATAN Z) 0.004 % Drop Inject into the eye. 1 Drops Ophthalmic At bedtime           Clinical Reference Information           Your Vitals Were     BP Pulse Temp Resp Height Weight    161/93 69 98 °F (36.7 °C) (Oral) 17 5' 5" (1.651 m) 90.7 kg (200 lb)    SpO2 BMI             99% 33.28 kg/m2         Allergies as of 2/10/2017        Reactions    No Known Drug Allergies       Immunizations " Administered on Date of Encounter - 2/10/2017     None      ED Micro, Lab, POCT     Start Ordered       Status Ordering Provider    02/10/17 1146 02/10/17 1146  B-Type natriuretic peptide (BNP)  STAT      Final result     02/10/17 1146 02/10/17 1146  Troponin I  STAT      Final result     02/10/17 1145 02/10/17 1146  CBC auto differential  STAT      Final result     02/10/17 1145 02/10/17 1146  Comprehensive metabolic panel  STAT      Final result     02/10/17 1145 02/10/17 1146  Influenza antigen Nasopharyngeal Swab  STAT      Final result       ED Imaging Orders     Start Ordered       Status Ordering Provider    02/10/17 1145 02/10/17 1146  X-Ray Chest PA And Lateral  1 time imaging      Final result       Discharge References/Attachments     ACUTE BRONCHITIS, WHAT IS (ENGLISH)    BRONCHITIS, ACUTE (ENGLISH)    CARDIOMYOPATHY, DISCHARGE INSTRUCTIONS FOR (ENGLISH)      Your Scheduled Appointments     Feb 13, 2017  2:40 PM CST   Fasting Lab with LABORATORY, [address] [facility]    [address] [place] [address] [phone]            Mar 10, 2017 10:30 AM CST   Pacemaker Tune Up with PACEMAKER CLINIC   [facility] - Arrhythmia Procedures ([facility])    [address] [facility] [address]  Third Floor  [facility] [address]   [phone]            Mar 31, 2017 10:40 AM CDT   Established Patient Visit with Severo Boyd MD   [facility] Cardiology ([facility])    [address] [place] [address] [phone]            Jul 12, 2017 11:00 AM CDT   Non-Fasting Lab with LABORATORY, [address] [facility]    [address] [place] [address] [phone]            Jul 17, 2017 10:40 AM CDT   Established Patient Visit with Víctor La MD   [facility] - Hematology Oncology ([facility])    [address] [place] [phone]              [facility] Sign-Up     Activating your [facility]  account is as easy as 1-2-3!     1) Visit [email or web address], select Sign Up Now, enter this activation code and your date of birth, then select Next.  [email or web address]  Expires: 3/27/2017  1:46 PM      2) Create a username and password to use when you visit [facility] in the future and select a security question in case you lose your password and select Next.    3) Enter your e-mail address and click Sign Up!    Additional Information  If you have questions, please e-mail [email or web address] or call [phone] to talk to our [employer] staff. Remember, [facility] is NOT to be used for urgent needs. For medical emergencies, dial 911.         Smoking Cessation     If you would like to quit smoking:   You may be eligible for free services if you are a [place] resident and started smoking cigarettes before September 1, 1988.  Call the Smoking Cessation Trust ([facility]) toll free at [phone] or [phone] [phone].   Call 4-867-QUIT-NOW if you do not meet the above criteria.             [facility] complies with applicable Federal civil rights laws and does not discriminate on the basis of race, color, national origin, age, disability, or sex.        Language Assistance Services     ATTENTION: Language assistance services are available, free of charge. Please call [phone].      ATENCIÓN: Si habla español, tiene a salazar disposición servicios gratuitos de asistencia lingüística. Llame al [phone].     CHÚ Ý: N?u b?n nói Ti?ng Vi?t, có các d?ch v? h? tr? ngôn ng? mi?n phí dành cho b?n. G?i s? [phone].

## 2017-02-13 ENCOUNTER — LAB VISIT (OUTPATIENT)
Dept: LAB | Facility: HOSPITAL | Age: 77
End: 2017-02-13
Attending: FAMILY MEDICINE
Payer: MEDICARE

## 2017-02-13 DIAGNOSIS — R73.9 HYPERGLYCEMIA: ICD-10-CM

## 2017-02-13 DIAGNOSIS — I50.9 CHF (NYHA CLASS III, ACC/AHA STAGE C): Chronic | ICD-10-CM

## 2017-02-13 DIAGNOSIS — I10 ESSENTIAL HYPERTENSION: ICD-10-CM

## 2017-02-13 DIAGNOSIS — E78.5 HYPERLIPIDEMIA, UNSPECIFIED HYPERLIPIDEMIA TYPE: ICD-10-CM

## 2017-02-13 DIAGNOSIS — I25.5 ISCHEMIC CARDIOMYOPATHY: ICD-10-CM

## 2017-02-13 PROCEDURE — 36415 COLL VENOUS BLD VENIPUNCTURE: CPT

## 2017-02-13 PROCEDURE — 83036 HEMOGLOBIN GLYCOSYLATED A1C: CPT

## 2017-02-13 PROCEDURE — 80061 LIPID PANEL: CPT

## 2017-02-13 PROCEDURE — 84443 ASSAY THYROID STIM HORMONE: CPT

## 2017-02-13 RX ORDER — LISINOPRIL 5 MG/1
TABLET ORAL
Qty: 30 TABLET | Refills: 4 | Status: SHIPPED | OUTPATIENT
Start: 2017-02-13 | End: 2017-03-31 | Stop reason: DRUGHIGH

## 2017-02-14 ENCOUNTER — OFFICE VISIT (OUTPATIENT)
Dept: INTERNAL MEDICINE | Facility: CLINIC | Age: 77
End: 2017-02-14
Payer: MEDICARE

## 2017-02-14 VITALS
DIASTOLIC BLOOD PRESSURE: 72 MMHG | HEART RATE: 73 BPM | BODY MASS INDEX: 31.99 KG/M2 | TEMPERATURE: 98 F | SYSTOLIC BLOOD PRESSURE: 126 MMHG | HEIGHT: 66 IN | WEIGHT: 199.06 LBS | OXYGEN SATURATION: 98 %

## 2017-02-14 DIAGNOSIS — J20.9 ACUTE BRONCHITIS, UNSPECIFIED ORGANISM: Primary | ICD-10-CM

## 2017-02-14 LAB
CHOLEST/HDLC SERPL: 4.1 {RATIO}
ESTIMATED AVG GLUCOSE: 100 MG/DL
HBA1C MFR BLD HPLC: 5.1 %
HDL/CHOLESTEROL RATIO: 24.2 %
HDLC SERPL-MCNC: 132 MG/DL
HDLC SERPL-MCNC: 32 MG/DL
LDLC SERPL CALC-MCNC: 57.2 MG/DL
NONHDLC SERPL-MCNC: 100 MG/DL
TRIGL SERPL-MCNC: 214 MG/DL
TSH SERPL DL<=0.005 MIU/L-ACNC: 1.02 UIU/ML

## 2017-02-14 PROCEDURE — 99214 OFFICE O/P EST MOD 30 MIN: CPT | Mod: PBBFAC | Performed by: NURSE PRACTITIONER

## 2017-02-14 PROCEDURE — 99213 OFFICE O/P EST LOW 20 MIN: CPT | Mod: S$PBB,,, | Performed by: NURSE PRACTITIONER

## 2017-02-14 PROCEDURE — 99999 PR PBB SHADOW E&M-EST. PATIENT-LVL IV: CPT | Mod: PBBFAC,,, | Performed by: NURSE PRACTITIONER

## 2017-02-14 NOTE — PROGRESS NOTES
Subjective:       Patient ID: Curtis Landry is a 76 y.o. male.    Chief Complaint: ER follow up    HPI Comments: Patient presents for ER follow-up. Patient reports going to the ER for chest pain and coughing. He states he was diagnosed with acute bronchitis. He states he is feeling better and has minimal coughing.     Review of Systems   Constitutional: Negative for chills, fatigue, fever and unexpected weight change.   Eyes: Negative for visual disturbance.   Respiratory: Positive for cough. Negative for shortness of breath.    Cardiovascular: Negative for chest pain.   Musculoskeletal: Negative for myalgias.   Neurological: Negative for headaches.       Objective:      Physical Exam   Constitutional: He is oriented to person, place, and time. He appears well-developed and well-nourished. No distress.   HENT:   Head: Atraumatic.   Cardiovascular: Normal rate, regular rhythm and normal heart sounds.  Exam reveals no friction rub.    No murmur heard.  Pulmonary/Chest: Effort normal and breath sounds normal. No respiratory distress. He has no wheezes.   Neurological: He is alert and oriented to person, place, and time.   Psychiatric: He has a normal mood and affect.   Vitals reviewed.      Assessment:       1. Acute bronchitis, unspecified organism        Plan:   Acute bronchitis, unspecified organism  Comments:  continue antibiotic and steroid as directed        Return if symptoms worsen or fail to improve, for keep routine follow up.

## 2017-02-15 ENCOUNTER — TELEPHONE (OUTPATIENT)
Dept: INTERNAL MEDICINE | Facility: CLINIC | Age: 77
End: 2017-02-15

## 2017-02-15 NOTE — TELEPHONE ENCOUNTER
----- Message from Staci Mckinnon NP sent at 2/15/2017  8:39 AM CST -----  Please notify patient all labs within normal limits except triglycerides elevated. Continue statin and watch fats and sugars in diet.

## 2017-03-31 ENCOUNTER — OFFICE VISIT (OUTPATIENT)
Dept: CARDIOLOGY | Facility: CLINIC | Age: 77
End: 2017-03-31
Payer: MEDICARE

## 2017-03-31 VITALS
HEIGHT: 66 IN | WEIGHT: 204.69 LBS | BODY MASS INDEX: 32.9 KG/M2 | SYSTOLIC BLOOD PRESSURE: 138 MMHG | DIASTOLIC BLOOD PRESSURE: 78 MMHG | HEART RATE: 68 BPM

## 2017-03-31 DIAGNOSIS — I25.5 ISCHEMIC CARDIOMYOPATHY: ICD-10-CM

## 2017-03-31 DIAGNOSIS — I10 ESSENTIAL HYPERTENSION: ICD-10-CM

## 2017-03-31 DIAGNOSIS — I50.9 CHF (NYHA CLASS III, ACC/AHA STAGE C): Primary | Chronic | ICD-10-CM

## 2017-03-31 PROCEDURE — 99999 PR PBB SHADOW E&M-EST. PATIENT-LVL III: CPT | Mod: PBBFAC,,, | Performed by: NUCLEAR MEDICINE

## 2017-03-31 PROCEDURE — 99213 OFFICE O/P EST LOW 20 MIN: CPT | Mod: PBBFAC | Performed by: NUCLEAR MEDICINE

## 2017-03-31 PROCEDURE — 99215 OFFICE O/P EST HI 40 MIN: CPT | Mod: S$PBB,,, | Performed by: NUCLEAR MEDICINE

## 2017-03-31 RX ORDER — LISINOPRIL 10 MG/1
10 TABLET ORAL DAILY
Qty: 30 TABLET | Refills: 4 | Status: SHIPPED | OUTPATIENT
Start: 2017-03-31 | End: 2017-09-05 | Stop reason: SDUPTHER

## 2017-03-31 NOTE — PROGRESS NOTES
Subjective:   Patient ID:  Curtis Landry is a 76 y.o. male who presents for follow-up of Congestive Heart Failure (4 month followup) and Hypertension      HPI CHRONIC HF STAGE C, FC 3,   ISCHEMIC CMP   2- ESSENTIAL HYPERTENSION  INCREASING CORBIN WITH MODERATE EXERTION. NO ORTHOPNEA OR PND  NO CHEST DISCOMFORT. NO PALPITATIONS. NO NEAR SYNCOPE OR SYNCOPE  NO EDEMA.NO CALVE TENDERNESS. NO INTERMITTENT CLAUDICATION  NO  FOCAL  CNS SYMPTOMS OR SIGNS TO SUGGEST TIA OR STROKE  NO ABNORMAL BLEEDING  CARD MED GOOD COMPLIANCE    Review of Systems   Constitution: Negative for chills, fever, weakness, night sweats, weight gain and weight loss.   HENT: Negative for headaches and nosebleeds.    Eyes: Negative for blurred vision, double vision and visual disturbance.   Cardiovascular: Positive for dyspnea on exertion. Negative for chest pain, irregular heartbeat, leg swelling, orthopnea, palpitations, paroxysmal nocturnal dyspnea and syncope.   Respiratory: Negative for cough, hemoptysis and wheezing.    Endocrine: Negative for polydipsia and polyuria.   Hematologic/Lymphatic: Does not bruise/bleed easily.   Skin: Negative for rash.   Musculoskeletal: Negative for joint pain, joint swelling, muscle weakness and myalgias.   Gastrointestinal: Negative for abdominal pain, hematemesis, jaundice and melena.   Genitourinary: Negative for dysuria, hematuria and nocturia.   Neurological: Negative for dizziness, focal weakness and sensory change.   Psychiatric/Behavioral: Negative for depression. The patient does not have insomnia and is not nervous/anxious.      Family History   Problem Relation Age of Onset    Cancer Mother      Past Medical History:   Diagnosis Date    Arthritis     CHF (congestive heart failure)     Glaucoma (increased eye pressure)     Followed by outside opthalmology    HTN (hypertension)     Hyperlipidemia     Prostate cancer      Current Outpatient Prescriptions on File Prior to Visit   Medication Sig  Dispense Refill    acetaminophen (TYLENOL) 650 MG TbSR Take 1 tablet (650 mg total) by mouth every 8 (eight) hours.  0    aspirin (ECOTRIN) 81 MG EC tablet Take by mouth. 1 Tablet, Delayed Release (E.C.) Oral Every day      calcium carbonate-vitamin D3 (CALCIUM 500 WITH D) 500 mg(1,250mg) -400 unit Tab Take by mouth.      carvedilol (COREG) 25 MG tablet TAKE 1 TABLET (25 MG TOTAL) BY MOUTH 2 (TWO) TIMES DAILY. 60 tablet 3    COD LIVER OIL ORAL Take by mouth.      cranberry 500 mg Cap Take 1 capsule by mouth daily as needed.      cyanocobalamin (VITAMIN B-12) 1000 MCG tablet Take by mouth. 1 Tablet Oral Every day      doxycycline (VIBRA-TABS) 100 MG tablet Take 1 tablet (100 mg total) by mouth 2 (two) times daily. 20 tablet 0    MULTIVITS,TH W-FE,OTHER MIN (COMPLETE MULTIVITAMIN ORAL) Take 1 tablet by mouth once daily.      simvastatin (ZOCOR) 40 MG tablet TAKE 1 TABLET BY MOUTH AT BEDTIME 30 tablet 5    travoprost (TRAVATAN Z) 0.004 % Drop Inject into the eye. 1 Drops Ophthalmic At bedtime      [DISCONTINUED] lisinopril (PRINIVIL,ZESTRIL) 5 MG tablet TAKE 1 TABLET (5 MG TOTAL) BY MOUTH ONCE DAILY. 30 tablet 4    nitroGLYCERIN (NITROSTAT) 0.4 MG SL tablet Place 1 tablet (0.4 mg total) under the tongue every 5 (five) minutes as needed for Chest pain. 100 tablet 0     No current facility-administered medications on file prior to visit.      Review of patient's allergies indicates:   Allergen Reactions    No known drug allergies        Objective:     Physical Exam   Constitutional: He is oriented to person, place, and time. He appears well-developed. No distress.   HENT:   Head: Normocephalic.   Eyes: Conjunctivae are normal. Pupils are equal, round, and reactive to light.   Neck: Neck supple. No JVD present. No thyromegaly present.   Cardiovascular: Normal rate, regular rhythm, normal heart sounds and intact distal pulses.  Exam reveals no gallop and no friction rub.    No murmur heard.  Pulses:        Carotid pulses are 2+ on the right side, and 2+ on the left side.       Radial pulses are 2+ on the right side, and 2+ on the left side.        Femoral pulses are 2+ on the right side, and 2+ on the left side.       Popliteal pulses are 2+ on the right side, and 2+ on the left side.        Dorsalis pedis pulses are 2+ on the right side, and 2+ on the left side.        Posterior tibial pulses are 2+ on the right side, and 2+ on the left side.   Pulmonary/Chest: Breath sounds normal. He has no wheezes. He has no rales. He exhibits no tenderness.   Abdominal: Soft. Bowel sounds are normal. He exhibits no mass. There is no hepatosplenomegaly. There is no tenderness.   Musculoskeletal: He exhibits no edema or tenderness.        Cervical back: Normal.        Thoracic back: Normal.        Lumbar back: Normal.   Lymphadenopathy:     He has no cervical adenopathy.     He has no axillary adenopathy.        Right: No supraclavicular adenopathy present.        Left: No supraclavicular adenopathy present.   Neurological: He is alert and oriented to person, place, and time. He has normal strength. No sensory deficit. Gait normal.   Skin: Skin is warm. No cyanosis. No pallor. Nails show no clubbing.   Psychiatric: He has a normal mood and affect. His speech is normal and behavior is normal. Cognition and memory are normal.       Assessment:     1. CHF (NYHA class III, ACC/AHA stage C)    2. Ischemic cardiomyopathy    3. Essential hypertension      WORSENING CORBIN SECONDARY TO HF.   BP WELL CONTROLLED  NO CLINICAL EVIDENCE OF ACTIVE MYOCARDIAL ISCHEMIA  NO EVIDENCE OF CARD ARRHYTHMIAS    Plan:     CHF (NYHA class III, ACC/AHA stage C)  -     lisinopril 10 MG tablet; Take 1 tablet (10 mg total) by mouth once daily.  Dispense: 30 tablet; Refill: 4  -     Basic metabolic panel; Future; Expected date: 3/31/17  -     Brain natriuretic peptide; Future; Expected date: 3/31/17    Ischemic cardiomyopathy  -     lisinopril 10 MG tablet; Take 1  tablet (10 mg total) by mouth once daily.  Dispense: 30 tablet; Refill: 4  -     Basic metabolic panel; Future; Expected date: 3/31/17  -     Brain natriuretic peptide; Future; Expected date: 3/31/17    Essential hypertension  -     lisinopril 10 MG tablet; Take 1 tablet (10 mg total) by mouth once daily.  Dispense: 30 tablet; Refill: 4    OMT ACE  CONTINUE SAME DOSAGE OF BBS    RETURN IN 3 MONTHS WITH BMP AND BNP

## 2017-03-31 NOTE — MR AVS SNAPSHOT
[facility]  [address] [facility]  [place] [address]  Phone: [phone]  Fax: [phone]                  Curtis Landry   3/31/2017 10:40 AM   Office Visit    Description:  Male : 1940   Provider:  Severo Boyd MD   Department:  [facility]           Reason for Visit     Congestive Heart Failure     Hypertension           Diagnoses this Visit        Comments    CHF (NYHA class III, ACC/AHA stage C)    -  Primary     Ischemic cardiomyopathy         Essential hypertension                To Do List           Future Appointments        Provider Department Dept Phone    2017 2:00 PM PACEMAKER CLINIC [facility] - Arrhythmia Procedures [phone]    2017 8:40 AM LABORATORY, [address] [facility] [phone]    2017 11:20 AM MD [facility] - Cardiology [phone]    2017 11:00 AM LABORATORY, [address] [facility] [phone]    2017 10:40 AM Víctor La MD Pending sale to [facility] - Hematology Oncology [phone]      Goals (5 Years of Data)              Today    Today    17    Blood Pressure < 140/90   138/78  138/78  138/78    LDL CHOLESTEROL < 130             Follow-Up and Disposition     Return in about 3 months (around 2017).       These Medications        Disp Refills Start End    lisinopril 10 MG tablet 30 tablet 4 3/31/2017 3/31/2018    Take 1 tablet (10 mg total) by mouth once daily. - Oral    Pharmacy: [facility]/pharmacy #[id number] - [place] - [address] AT [facility] Ph #: [phone]         [facility] On Call     [facility] On Call Nurse Care Line - / Assistance  Unless otherwise directed by your provider, please contact [facility], our nurse care line that is available for 24/7 assistance.     Registered nurses in the [facility] provide: appointment scheduling, clinical advisement, health education, and other advisory  services.  Call: [phone] (toll free)               Medications           Message regarding Medications     Verify the changes and/or additions to your medication regime listed below are the same as discussed with your clinician today.  If any of these changes or additions are incorrect, please notify your healthcare provider.        START taking these NEW medications        Refills    lisinopril 10 MG tablet 4    Sig: Take 1 tablet (10 mg total) by mouth once daily.    Class: Normal    Route: Oral           Verify that the below list of medications is an accurate representation of the medications you are currently taking.  If none reported, the list may be blank. If incorrect, please contact your healthcare provider. Carry this list with you in case of emergency.           Current Medications     acetaminophen (TYLENOL) 650 MG TbSR Take 1 tablet (650 mg total) by mouth every 8 (eight) hours.    aspirin (ECOTRIN) 81 MG EC tablet Take by mouth. 1 Tablet, Delayed Release (E.C.) Oral Every day    calcium carbonate-vitamin D3 (CALCIUM 500 WITH D) 500 mg(1,250mg) -400 unit Tab Take by mouth.    carvedilol (COREG) 25 MG tablet TAKE 1 TABLET (25 MG TOTAL) BY MOUTH 2 (TWO) TIMES DAILY.    COD LIVER OIL ORAL Take by mouth.    cranberry 500 mg Cap Take 1 capsule by mouth daily as needed.    cyanocobalamin (VITAMIN B-12) 1000 MCG tablet Take by mouth. 1 Tablet Oral Every day    doxycycline (VIBRA-TABS) 100 MG tablet Take 1 tablet (100 mg total) by mouth 2 (two) times daily.    MULTIVITS,TH W-FE,OTHER MIN (COMPLETE MULTIVITAMIN ORAL) Take 1 tablet by mouth once daily.    simvastatin (ZOCOR) 40 MG tablet TAKE 1 TABLET BY MOUTH AT BEDTIME    travoprost (TRAVATAN Z) 0.004 % Drop Inject into the eye. 1 Drops Ophthalmic At bedtime    lisinopril 10 MG tablet Take 1 tablet (10 mg total) by mouth once daily.    nitroGLYCERIN (NITROSTAT) 0.4 MG SL tablet Place 1 tablet (0.4 mg total) under the tongue every 5 (five) minutes as  "needed for Chest pain.           Clinical Reference Information           Your Vitals Were     BP Pulse Height Weight BMI    138/78 (BP Location: Left arm, Patient Position: Sitting, BP Method: Manual) 68 5' 6" (1.676 m) 92.8 kg (204 lb 11.2 oz) 33.04 kg/m2      Blood Pressure          Most Recent Value    BP  138/78      Allergies as of 3/31/2017     No Known Drug Allergies      Immunizations Administered on Date of Encounter - 3/31/2017     None      Orders Placed During Today's Visit     Future Labs/Procedures Expected by Expires    Basic metabolic panel  3/31/2017 (Approximate) 3/31/2018    Brain natriuretic peptide  3/31/2017 (Approximate) 3/31/2018      [facility] Sign-Up     Activating your [facility] account is as easy as 1-2-3!     1) Visit [email or web address], select Sign Up Now, enter this activation code and your date of birth, then select Next.  [email or web address]  Expires: 5/15/2017 11:44 AM      2) Create a username and password to use when you visit [facility] in the future and select a security question in case you lose your password and select Next.    3) Enter your e-mail address and click Sign Up!    Additional Information  If you have questions, please e-mail [email or web address] or call [phone] to talk to our [facility] staff. Remember, [facility] is NOT to be used for urgent needs. For medical emergencies, dial 911.         Language Assistance Services     ATTENTION: Language assistance services are available, free of charge. Please call [phone].      ATENCIÓN: Si habla español, tiene a salazar disposición servicios gratuitos de asistencia lingüística. Llame al [phone].     CHÚ Ý: N?u b?n nói Ti?ng Vi?t, có các d?ch v? h? tr? ngôn ng? mi?n phí dành cho b?n. G?i s? [phone].         [facility] complies with applicable Federal civil rights laws and does not discriminate on the basis of race, color, national origin, age, disability, or sex.        "

## 2017-04-02 RX ORDER — CARVEDILOL 25 MG/1
TABLET ORAL
Qty: 60 TABLET | Refills: 3 | Status: SHIPPED | OUTPATIENT
Start: 2017-04-02 | End: 2017-08-07 | Stop reason: SDUPTHER

## 2017-04-21 ENCOUNTER — CLINICAL SUPPORT (OUTPATIENT)
Dept: CARDIOLOGY | Facility: CLINIC | Age: 77
End: 2017-04-21
Payer: MEDICARE

## 2017-04-21 DIAGNOSIS — I25.5 ISCHEMIC CARDIOMYOPATHY: ICD-10-CM

## 2017-04-21 PROCEDURE — 93283 PRGRMG EVAL IMPLANTABLE DFB: CPT | Mod: 26,,, | Performed by: INTERNAL MEDICINE

## 2017-06-03 RX ORDER — SIMVASTATIN 40 MG/1
TABLET, FILM COATED ORAL
Qty: 30 TABLET | Refills: 5 | Status: SHIPPED | OUTPATIENT
Start: 2017-06-03 | End: 2017-06-06 | Stop reason: SDUPTHER

## 2017-06-07 RX ORDER — SIMVASTATIN 40 MG/1
40 TABLET, FILM COATED ORAL NIGHTLY
Qty: 90 TABLET | Refills: 3 | Status: SHIPPED | OUTPATIENT
Start: 2017-06-07 | End: 2017-10-06 | Stop reason: SDUPTHER

## 2017-06-26 ENCOUNTER — LAB VISIT (OUTPATIENT)
Dept: LAB | Facility: HOSPITAL | Age: 77
End: 2017-06-26
Attending: NUCLEAR MEDICINE
Payer: MEDICARE

## 2017-06-26 DIAGNOSIS — I50.9 CHF (NYHA CLASS III, ACC/AHA STAGE C): Chronic | ICD-10-CM

## 2017-06-26 DIAGNOSIS — I25.5 ISCHEMIC CARDIOMYOPATHY: ICD-10-CM

## 2017-06-26 LAB
ANION GAP SERPL CALC-SCNC: 7 MMOL/L
BNP SERPL-MCNC: 91 PG/ML
BUN SERPL-MCNC: 10 MG/DL
CALCIUM SERPL-MCNC: 9.9 MG/DL
CHLORIDE SERPL-SCNC: 107 MMOL/L
CO2 SERPL-SCNC: 27 MMOL/L
CREAT SERPL-MCNC: 1.1 MG/DL
EST. GFR  (AFRICAN AMERICAN): >60 ML/MIN/1.73 M^2
EST. GFR  (NON AFRICAN AMERICAN): >60 ML/MIN/1.73 M^2
GLUCOSE SERPL-MCNC: 113 MG/DL
POTASSIUM SERPL-SCNC: 4 MMOL/L
SODIUM SERPL-SCNC: 141 MMOL/L

## 2017-06-26 PROCEDURE — 83880 ASSAY OF NATRIURETIC PEPTIDE: CPT

## 2017-06-26 PROCEDURE — 80048 BASIC METABOLIC PNL TOTAL CA: CPT

## 2017-06-26 PROCEDURE — 36415 COLL VENOUS BLD VENIPUNCTURE: CPT

## 2017-06-30 ENCOUNTER — OFFICE VISIT (OUTPATIENT)
Dept: CARDIOLOGY | Facility: CLINIC | Age: 77
End: 2017-06-30
Payer: MEDICARE

## 2017-06-30 VITALS
DIASTOLIC BLOOD PRESSURE: 74 MMHG | BODY MASS INDEX: 33.27 KG/M2 | HEART RATE: 72 BPM | SYSTOLIC BLOOD PRESSURE: 140 MMHG | HEIGHT: 66 IN | WEIGHT: 207 LBS

## 2017-06-30 DIAGNOSIS — Z95.810 AUTOMATIC IMPLANTABLE CARDIOVERTER-DEFIBRILLATOR IN SITU: ICD-10-CM

## 2017-06-30 DIAGNOSIS — I25.5 ISCHEMIC CARDIOMYOPATHY: ICD-10-CM

## 2017-06-30 DIAGNOSIS — I10 ESSENTIAL HYPERTENSION: ICD-10-CM

## 2017-06-30 DIAGNOSIS — I50.9 CHF (NYHA CLASS III, ACC/AHA STAGE C): Primary | Chronic | ICD-10-CM

## 2017-06-30 PROCEDURE — 1159F MED LIST DOCD IN RCRD: CPT | Mod: ,,, | Performed by: NUCLEAR MEDICINE

## 2017-06-30 PROCEDURE — 99213 OFFICE O/P EST LOW 20 MIN: CPT | Mod: PBBFAC | Performed by: NUCLEAR MEDICINE

## 2017-06-30 PROCEDURE — 99999 PR PBB SHADOW E&M-EST. PATIENT-LVL III: CPT | Mod: PBBFAC,,, | Performed by: NUCLEAR MEDICINE

## 2017-06-30 PROCEDURE — 99214 OFFICE O/P EST MOD 30 MIN: CPT | Mod: S$PBB,,, | Performed by: NUCLEAR MEDICINE

## 2017-06-30 PROCEDURE — 1126F AMNT PAIN NOTED NONE PRSNT: CPT | Mod: ,,, | Performed by: NUCLEAR MEDICINE

## 2017-06-30 RX ORDER — DORZOLAMIDE HYDROCHLORIDE AND TIMOLOL MALEATE 20; 5 MG/ML; MG/ML
1 SOLUTION/ DROPS OPHTHALMIC 2 TIMES DAILY
Refills: 4 | COMMUNITY
Start: 2017-06-13

## 2017-06-30 NOTE — PROGRESS NOTES
Subjective:   Patient ID:  Curtis Landry is a 76 y.o. male who presents for follow-up of Congestive Heart Failure (3 month followup)      HPI CHRONIC HFrEF, IMPROVED EF   UP TO 55%-   STAGE C, FC 3-  ISCHEMIC CMP-  2-ESSENTIAL HYPERTENSION  DOING WELL. PATTERN OF CORBIN UNCHANGED.  SLEEP WITH 2 PILLOWS - UNCHANGED. NO PND  NO RECURRENT ANGINA. NO ANGINA EQUIVALENT  NO PALPITATIONS. NO NEAR SYNCOPE OR SYNCOPE  NO FOCAL CNS SYMPTOMS OR SIGNS TO SUGGEST TIA OR STROKE  NO EDEMA. NO INTERMITTENT CLAUDICATION  CARD MED GOOD COMPLIANCE    Review of Systems   Constitution: Negative for chills, fever, weakness, night sweats, weight gain and weight loss.   HENT: Negative for headaches and nosebleeds.    Eyes: Negative for blurred vision, double vision and visual disturbance.   Cardiovascular: Positive for dyspnea on exertion. Negative for chest pain, irregular heartbeat, leg swelling, orthopnea, palpitations, paroxysmal nocturnal dyspnea and syncope.   Respiratory: Negative for cough, hemoptysis and wheezing.    Endocrine: Negative for polydipsia and polyuria.   Hematologic/Lymphatic: Does not bruise/bleed easily.   Skin: Negative for rash.   Musculoskeletal: Negative for joint pain, joint swelling, muscle weakness and myalgias.   Gastrointestinal: Negative for abdominal pain, hematemesis, jaundice and melena.   Genitourinary: Negative for dysuria, hematuria and nocturia.   Neurological: Negative for dizziness, focal weakness and sensory change.   Psychiatric/Behavioral: Negative for depression. The patient does not have insomnia and is not nervous/anxious.      Family History   Problem Relation Age of Onset    Cancer Mother      Past Medical History:   Diagnosis Date    Arthritis     CHF (congestive heart failure)     Glaucoma (increased eye pressure)     Followed by outside opthalmology    HTN (hypertension)     Hyperlipidemia     Prostate cancer      Current Outpatient Prescriptions on File Prior to Visit    Medication Sig Dispense Refill    acetaminophen (TYLENOL) 650 MG TbSR Take 1 tablet (650 mg total) by mouth every 8 (eight) hours.  0    aspirin (ECOTRIN) 81 MG EC tablet Take by mouth. 1 Tablet, Delayed Release (E.C.) Oral Every day      calcium carbonate-vitamin D3 (CALCIUM 500 WITH D) 500 mg(1,250mg) -400 unit Tab Take by mouth.      carvedilol (COREG) 25 MG tablet TAKE 1 TABLET (25 MG TOTAL) BY MOUTH 2 (TWO) TIMES DAILY. 60 tablet 3    COD LIVER OIL ORAL Take by mouth.      cranberry 500 mg Cap Take 1 capsule by mouth daily as needed.      cyanocobalamin (VITAMIN B-12) 1000 MCG tablet Take by mouth. 1 Tablet Oral Every day      lisinopril 10 MG tablet Take 1 tablet (10 mg total) by mouth once daily. 30 tablet 4    MULTIVITS,TH W-FE,OTHER MIN (COMPLETE MULTIVITAMIN ORAL) Take 1 tablet by mouth once daily.      simvastatin (ZOCOR) 40 MG tablet Take 1 tablet (40 mg total) by mouth every evening. 90 tablet 3    travoprost (TRAVATAN Z) 0.004 % Drop Inject into the eye. 1 Drops Ophthalmic At bedtime      [DISCONTINUED] doxycycline (VIBRA-TABS) 100 MG tablet Take 1 tablet (100 mg total) by mouth 2 (two) times daily. 20 tablet 0    nitroGLYCERIN (NITROSTAT) 0.4 MG SL tablet Place 1 tablet (0.4 mg total) under the tongue every 5 (five) minutes as needed for Chest pain. 100 tablet 0     No current facility-administered medications on file prior to visit.      Review of patient's allergies indicates:   Allergen Reactions    No known drug allergies        Objective:     Physical Exam   Constitutional: He is oriented to person, place, and time. He appears well-developed. No distress.   HENT:   Head: Normocephalic.   Eyes: Conjunctivae are normal. Pupils are equal, round, and reactive to light.   Neck: Neck supple. No JVD present. No thyromegaly present.   Cardiovascular: Normal rate, regular rhythm, normal heart sounds and intact distal pulses.  Exam reveals no gallop and no friction rub.    No murmur  heard.  Pulses:       Carotid pulses are 2+ on the right side, and 2+ on the left side.       Radial pulses are 2+ on the right side, and 2+ on the left side.        Femoral pulses are 2+ on the right side, and 2+ on the left side.       Popliteal pulses are 2+ on the right side, and 2+ on the left side.        Dorsalis pedis pulses are 2+ on the right side, and 2+ on the left side.        Posterior tibial pulses are 2+ on the right side, and 2+ on the left side.   Pulmonary/Chest: Breath sounds normal. He has no wheezes. He has no rales. He exhibits no tenderness.   Abdominal: Soft. Bowel sounds are normal. He exhibits no mass. There is no hepatosplenomegaly. There is no tenderness.   Musculoskeletal: He exhibits no edema or tenderness.        Cervical back: Normal.        Thoracic back: Normal.        Lumbar back: Normal.   Lymphadenopathy:     He has no cervical adenopathy.     He has no axillary adenopathy.        Right: No supraclavicular adenopathy present.        Left: No supraclavicular adenopathy present.   Neurological: He is alert and oriented to person, place, and time. He has normal strength. No sensory deficit. Gait normal.   Skin: Skin is warm. No cyanosis. No pallor. Nails show no clubbing.   Psychiatric: He has a normal mood and affect. His speech is normal and behavior is normal. Cognition and memory are normal.       Assessment:     1. CHF (NYHA class III, ACC/AHA stage C)    2. Ischemic cardiomyopathy    3. Essential hypertension      CLINICALLY WELL COMPENSATED HF  NO EVIDENCE OF ACTIVE MYOCARDIAL ISCHEMIA  NO ARRHYTHMIAS  BP WELL CONTROLLED  CNS STATUS STABLE  CARD MED WELL TOLERATED  Plan:     CHF (NYHA class III, ACC/AHA stage C)    Ischemic cardiomyopathy    Essential hypertension    1- CONTINUE PRESENT CARD MANAGEMENT    2- RETURN IN 3 MONTHS

## 2017-07-12 ENCOUNTER — LAB VISIT (OUTPATIENT)
Dept: LAB | Facility: HOSPITAL | Age: 77
End: 2017-07-12
Attending: FAMILY MEDICINE
Payer: MEDICARE

## 2017-07-12 DIAGNOSIS — E78.5 HYPERLIPIDEMIA: ICD-10-CM

## 2017-07-12 DIAGNOSIS — D47.2 MGUS (MONOCLONAL GAMMOPATHY OF UNKNOWN SIGNIFICANCE): ICD-10-CM

## 2017-07-12 DIAGNOSIS — D63.8 ANEMIA IN CHRONIC ILLNESS: ICD-10-CM

## 2017-07-12 LAB
ALBUMIN SERPL BCP-MCNC: 3.9 G/DL
ALP SERPL-CCNC: 49 U/L
ALT SERPL W/O P-5'-P-CCNC: 24 U/L
ANION GAP SERPL CALC-SCNC: 9 MMOL/L
AST SERPL-CCNC: 23 U/L
BASOPHILS # BLD AUTO: 0.02 K/UL
BASOPHILS NFR BLD: 0.4 %
BILIRUB SERPL-MCNC: 0.6 MG/DL
BUN SERPL-MCNC: 14 MG/DL
CALCIUM SERPL-MCNC: 10 MG/DL
CHLORIDE SERPL-SCNC: 106 MMOL/L
CHOLEST/HDLC SERPL: 4.3 {RATIO}
CO2 SERPL-SCNC: 26 MMOL/L
CREAT SERPL-MCNC: 1 MG/DL
DIFFERENTIAL METHOD: ABNORMAL
EOSINOPHIL # BLD AUTO: 0.2 K/UL
EOSINOPHIL NFR BLD: 3.6 %
ERYTHROCYTE [DISTWIDTH] IN BLOOD BY AUTOMATED COUNT: 12.5 %
EST. GFR  (AFRICAN AMERICAN): >60 ML/MIN/1.73 M^2
EST. GFR  (NON AFRICAN AMERICAN): >60 ML/MIN/1.73 M^2
GLUCOSE SERPL-MCNC: 103 MG/DL
HCT VFR BLD AUTO: 33 %
HDL/CHOLESTEROL RATIO: 23.3 %
HDLC SERPL-MCNC: 150 MG/DL
HDLC SERPL-MCNC: 35 MG/DL
HGB BLD-MCNC: 11.2 G/DL
LDLC SERPL CALC-MCNC: 72.8 MG/DL
LYMPHOCYTES # BLD AUTO: 2.2 K/UL
LYMPHOCYTES NFR BLD: 43.3 %
MCH RBC QN AUTO: 30.7 PG
MCHC RBC AUTO-ENTMCNC: 33.9 %
MCV RBC AUTO: 90 FL
MONOCYTES # BLD AUTO: 0.6 K/UL
MONOCYTES NFR BLD: 11.3 %
NEUTROPHILS # BLD AUTO: 2.1 K/UL
NEUTROPHILS NFR BLD: 41.4 %
NONHDLC SERPL-MCNC: 115 MG/DL
PLATELET # BLD AUTO: 187 K/UL
PMV BLD AUTO: 8.6 FL
POTASSIUM SERPL-SCNC: 4.1 MMOL/L
PROT SERPL-MCNC: 8.3 G/DL
RBC # BLD AUTO: 3.65 M/UL
SODIUM SERPL-SCNC: 141 MMOL/L
TRIGL SERPL-MCNC: 211 MG/DL
WBC # BLD AUTO: 4.96 K/UL

## 2017-07-12 PROCEDURE — 80061 LIPID PANEL: CPT

## 2017-07-12 PROCEDURE — 86334 IMMUNOFIX E-PHORESIS SERUM: CPT

## 2017-07-12 PROCEDURE — 84165 PROTEIN E-PHORESIS SERUM: CPT | Mod: 26,,, | Performed by: PATHOLOGY

## 2017-07-12 PROCEDURE — 86334 IMMUNOFIX E-PHORESIS SERUM: CPT | Mod: 26,,, | Performed by: PATHOLOGY

## 2017-07-12 PROCEDURE — 36415 COLL VENOUS BLD VENIPUNCTURE: CPT

## 2017-07-12 PROCEDURE — 84165 PROTEIN E-PHORESIS SERUM: CPT

## 2017-07-12 PROCEDURE — 80053 COMPREHEN METABOLIC PANEL: CPT

## 2017-07-12 PROCEDURE — 85025 COMPLETE CBC W/AUTO DIFF WBC: CPT

## 2017-07-12 PROCEDURE — 83520 IMMUNOASSAY QUANT NOS NONAB: CPT

## 2017-07-13 LAB
ALBUMIN SERPL ELPH-MCNC: 4.14 G/DL
ALPHA1 GLOB SERPL ELPH-MCNC: 0.28 G/DL
ALPHA2 GLOB SERPL ELPH-MCNC: 0.77 G/DL
B-GLOBULIN SERPL ELPH-MCNC: 0.75 G/DL
GAMMA GLOB SERPL ELPH-MCNC: 1.76 G/DL
INTERPRETATION SERPL IFE-IMP: NORMAL
KAPPA LC SER QL IA: 2.09 MG/DL
KAPPA LC/LAMBDA SER IA: 0.68
LAMBDA LC SER QL IA: 3.09 MG/DL
PATHOLOGIST INTERPRETATION IFE: NORMAL
PATHOLOGIST INTERPRETATION SPE: NORMAL
PROT SERPL-MCNC: 7.7 G/DL

## 2017-08-01 NOTE — PROGRESS NOTES
Cholesterol panel is stable, triglycerides are still elevated. Watch your diet including sugar intake. Please let me know if you have any questions.

## 2017-08-07 RX ORDER — CARVEDILOL 25 MG/1
TABLET ORAL
Qty: 60 TABLET | Refills: 3 | Status: SHIPPED | OUTPATIENT
Start: 2017-08-07 | End: 2017-09-05 | Stop reason: SDUPTHER

## 2017-08-24 ENCOUNTER — TELEPHONE (OUTPATIENT)
Dept: CARDIOLOGY | Facility: CLINIC | Age: 77
End: 2017-08-24

## 2017-09-05 DIAGNOSIS — I25.5 ISCHEMIC CARDIOMYOPATHY: ICD-10-CM

## 2017-09-05 DIAGNOSIS — I50.9 CHF (NYHA CLASS III, ACC/AHA STAGE C): Chronic | ICD-10-CM

## 2017-09-05 DIAGNOSIS — I10 ESSENTIAL HYPERTENSION: ICD-10-CM

## 2017-09-05 RX ORDER — CARVEDILOL 25 MG/1
TABLET ORAL
Qty: 60 TABLET | Refills: 3 | Status: SHIPPED | OUTPATIENT
Start: 2017-09-05 | End: 2017-10-26 | Stop reason: SDUPTHER

## 2017-09-05 RX ORDER — LISINOPRIL 10 MG/1
10 TABLET ORAL DAILY
Qty: 30 TABLET | Refills: 4 | Status: SHIPPED | OUTPATIENT
Start: 2017-09-05 | End: 2017-09-07 | Stop reason: SDUPTHER

## 2017-09-07 DIAGNOSIS — I50.9 CHF (NYHA CLASS III, ACC/AHA STAGE C): Chronic | ICD-10-CM

## 2017-09-07 DIAGNOSIS — I10 ESSENTIAL HYPERTENSION: ICD-10-CM

## 2017-09-07 DIAGNOSIS — I25.5 ISCHEMIC CARDIOMYOPATHY: ICD-10-CM

## 2017-09-07 RX ORDER — LISINOPRIL 10 MG/1
10 TABLET ORAL DAILY
Qty: 30 TABLET | Refills: 4 | Status: SHIPPED | OUTPATIENT
Start: 2017-09-07 | End: 2017-11-06 | Stop reason: SDUPTHER

## 2017-10-06 RX ORDER — SIMVASTATIN 40 MG/1
40 TABLET, FILM COATED ORAL NIGHTLY
Qty: 90 TABLET | Refills: 3 | Status: SHIPPED | OUTPATIENT
Start: 2017-10-06 | End: 2018-08-14 | Stop reason: SDUPTHER

## 2017-10-26 RX ORDER — CARVEDILOL 25 MG/1
TABLET ORAL
Qty: 180 TABLET | Refills: 3 | Status: SHIPPED | OUTPATIENT
Start: 2017-10-26 | End: 2018-10-28 | Stop reason: SDUPTHER

## 2017-11-06 DIAGNOSIS — I10 ESSENTIAL HYPERTENSION: ICD-10-CM

## 2017-11-06 DIAGNOSIS — I50.9 CHF (NYHA CLASS III, ACC/AHA STAGE C): Chronic | ICD-10-CM

## 2017-11-06 DIAGNOSIS — I25.5 ISCHEMIC CARDIOMYOPATHY: ICD-10-CM

## 2017-11-06 RX ORDER — LISINOPRIL 10 MG/1
TABLET ORAL
Qty: 30 TABLET | Refills: 1 | Status: SHIPPED | OUTPATIENT
Start: 2017-11-06 | End: 2018-01-31 | Stop reason: SDUPTHER

## 2018-01-18 ENCOUNTER — PATIENT OUTREACH (OUTPATIENT)
Dept: ADMINISTRATIVE | Facility: HOSPITAL | Age: 78
End: 2018-01-18

## 2018-01-18 NOTE — PROGRESS NOTES
Schedule patient for blood pressure recheck on 02/19/18 at 09:20 am with Dr. Hall. Patient in agreement and vocalize understanding. A appointment reminder sent in the mail.

## 2018-01-18 NOTE — PROGRESS NOTES
Patient call back requested a later time, scheduled 02/19/18 at 10:40 am. Patient in agreement and vocalized understanding. A updated appointment reminder sent in the mail.

## 2018-01-31 DIAGNOSIS — I25.5 ISCHEMIC CARDIOMYOPATHY: ICD-10-CM

## 2018-01-31 DIAGNOSIS — I10 ESSENTIAL HYPERTENSION: ICD-10-CM

## 2018-01-31 DIAGNOSIS — I50.9 CHF (NYHA CLASS III, ACC/AHA STAGE C): Chronic | ICD-10-CM

## 2018-01-31 RX ORDER — LISINOPRIL 10 MG/1
TABLET ORAL
Qty: 30 TABLET | Refills: 4 | Status: SHIPPED | OUTPATIENT
Start: 2018-01-31 | End: 2018-05-05 | Stop reason: SDUPTHER

## 2018-02-19 ENCOUNTER — OFFICE VISIT (OUTPATIENT)
Dept: INTERNAL MEDICINE | Facility: CLINIC | Age: 78
End: 2018-02-19
Payer: MEDICARE

## 2018-02-19 VITALS
DIASTOLIC BLOOD PRESSURE: 60 MMHG | SYSTOLIC BLOOD PRESSURE: 138 MMHG | HEART RATE: 67 BPM | HEIGHT: 66 IN | WEIGHT: 203.06 LBS | BODY MASS INDEX: 32.64 KG/M2 | OXYGEN SATURATION: 98 % | TEMPERATURE: 97 F

## 2018-02-19 DIAGNOSIS — E78.5 HYPERLIPIDEMIA, UNSPECIFIED HYPERLIPIDEMIA TYPE: ICD-10-CM

## 2018-02-19 DIAGNOSIS — I10 ESSENTIAL HYPERTENSION: Primary | ICD-10-CM

## 2018-02-19 DIAGNOSIS — I50.9 CHF (NYHA CLASS III, ACC/AHA STAGE C): Chronic | ICD-10-CM

## 2018-02-19 DIAGNOSIS — Z23 NEED FOR 23-POLYVALENT PNEUMOCOCCAL POLYSACCHARIDE VACCINE: ICD-10-CM

## 2018-02-19 DIAGNOSIS — M79.652 BILATERAL THIGH PAIN: ICD-10-CM

## 2018-02-19 DIAGNOSIS — D63.8 ANEMIA IN CHRONIC ILLNESS: ICD-10-CM

## 2018-02-19 DIAGNOSIS — D47.2 MGUS (MONOCLONAL GAMMOPATHY OF UNKNOWN SIGNIFICANCE): ICD-10-CM

## 2018-02-19 DIAGNOSIS — C61 PROSTATE CANCER: ICD-10-CM

## 2018-02-19 DIAGNOSIS — M79.651 BILATERAL THIGH PAIN: ICD-10-CM

## 2018-02-19 PROCEDURE — 99999 PR PBB SHADOW E&M-EST. PATIENT-LVL V: CPT | Mod: PBBFAC,,, | Performed by: FAMILY MEDICINE

## 2018-02-19 PROCEDURE — G0009 ADMIN PNEUMOCOCCAL VACCINE: HCPCS | Mod: PBBFAC

## 2018-02-19 PROCEDURE — 99214 OFFICE O/P EST MOD 30 MIN: CPT | Mod: S$PBB,,, | Performed by: FAMILY MEDICINE

## 2018-02-19 PROCEDURE — 99215 OFFICE O/P EST HI 40 MIN: CPT | Mod: PBBFAC | Performed by: FAMILY MEDICINE

## 2018-02-19 PROCEDURE — 1159F MED LIST DOCD IN RCRD: CPT | Mod: ,,, | Performed by: FAMILY MEDICINE

## 2018-02-19 PROCEDURE — 1126F AMNT PAIN NOTED NONE PRSNT: CPT | Mod: ,,, | Performed by: FAMILY MEDICINE

## 2018-02-19 NOTE — PROGRESS NOTES
"Subjective:       Patient ID: Curtis Landry is a 77 y.o. male.    Chief Complaint: Annual Exam    Patient presents to clinic today for followup of chronic conditions. Caregiver present. Patient reports 2-3 week history of bilateral thigh pain he describes as "sore". No injury, this has been intermittent.      Review of Systems   Constitutional: Negative for chills, fatigue, fever and unexpected weight change.   Eyes: Negative for visual disturbance.   Respiratory: Negative for shortness of breath.    Cardiovascular: Negative for chest pain.   Musculoskeletal: Positive for arthralgias and myalgias.   Neurological: Negative for headaches.       Objective:      Physical Exam   Constitutional: He is oriented to person, place, and time. He appears well-developed and well-nourished. No distress.   HENT:   Head: Normocephalic and atraumatic.   Eyes: Conjunctivae and EOM are normal. Pupils are equal, round, and reactive to light. No scleral icterus.   Cardiovascular: Normal rate and regular rhythm.  Exam reveals no gallop and no friction rub.    No murmur heard.  Pulmonary/Chest: Effort normal and breath sounds normal.   Neurological: He is alert and oriented to person, place, and time. No cranial nerve deficit. Gait normal.   Psychiatric: He has a normal mood and affect.   Vitals reviewed.      Assessment:       1. Essential hypertension    2. Hyperlipidemia, unspecified hyperlipidemia type    3. CHF (NYHA class III, ACC/AHA stage C)    4. Anemia in chronic illness    5. Prostate cancer    6. MGUS (monoclonal gammopathy of unknown significance)    7. Bilateral thigh pain    8. Need for 23-polyvalent pneumococcal polysaccharide vaccine        Plan:     Problem List Items Addressed This Visit     CHF (NYHA class III, ACC/AHA stage C) (Chronic)    Relevant Orders    Ambulatory referral to Cardiology    Anemia in chronic illness    Relevant Orders    Ambulatory referral to Hematology / Oncology    MGUS (monoclonal gammopathy " of unknown significance)    Relevant Orders    Ambulatory referral to Hematology / Oncology    Hyperlipidemia    Current Assessment & Plan     Status pending labs, continue simvastatin           Relevant Orders    Comprehensive metabolic panel    Lipid panel    Prostate cancer    Relevant Orders    Ambulatory referral to Urology    Ambulatory referral to Hematology / Oncology    Essential hypertension - Primary    Current Assessment & Plan     Controlled, continue lisinopril and coreg         Relevant Orders    TSH    CBC auto differential    Bilateral thigh pain    Relevant Orders    X-Ray Femur 2 View Bilateral (Completed)      Other Visit Diagnoses     Need for 23-polyvalent pneumococcal polysaccharide vaccine        Relevant Orders    Pneumococcal Polysaccharide Vaccine (23 Valent) (SQ/IM) (Completed)          Health Maintenance reviewed/updated. Pneumovax today. Will check with pharmacy regarding Tdap. Will return for lab/x-ray tomorrow.  Will schedule follow up visits with specialist. He has canceled/no showed last visits. Patient reports he was unaware of appointments.

## 2018-02-20 ENCOUNTER — HOSPITAL ENCOUNTER (OUTPATIENT)
Dept: RADIOLOGY | Facility: HOSPITAL | Age: 78
Discharge: HOME | End: 2018-02-20
Attending: FAMILY MEDICINE
Payer: MEDICARE

## 2018-02-20 DIAGNOSIS — M79.651 BILATERAL THIGH PAIN: ICD-10-CM

## 2018-02-20 DIAGNOSIS — M79.652 BILATERAL THIGH PAIN: ICD-10-CM

## 2018-02-20 PROCEDURE — 73552 X-RAY EXAM OF FEMUR 2/>: CPT | Mod: 50,TC

## 2018-02-20 PROCEDURE — 73552 X-RAY EXAM OF FEMUR 2/>: CPT | Mod: 26,50,, | Performed by: RADIOLOGY

## 2018-02-23 ENCOUNTER — TELEPHONE (OUTPATIENT)
Dept: INTERNAL MEDICINE | Facility: CLINIC | Age: 78
End: 2018-02-23

## 2018-02-23 NOTE — TELEPHONE ENCOUNTER
----- Message from Liz Hall MD sent at 2/20/2018 10:03 PM CST -----  X-ray shows mild arthritis left hip. Otherwise appears okay. Recommend follow up if leg pain worsens/persists.

## 2018-04-21 RX ORDER — NITROGLYCERIN 0.4 MG/1
TABLET SUBLINGUAL
Qty: 100 TABLET | Refills: 0 | Status: SHIPPED | OUTPATIENT
Start: 2018-04-21 | End: 2018-05-20 | Stop reason: SDUPTHER

## 2018-05-05 DIAGNOSIS — I10 ESSENTIAL HYPERTENSION: ICD-10-CM

## 2018-05-05 DIAGNOSIS — I50.9 CHF (NYHA CLASS III, ACC/AHA STAGE C): Chronic | ICD-10-CM

## 2018-05-05 DIAGNOSIS — I25.5 ISCHEMIC CARDIOMYOPATHY: ICD-10-CM

## 2018-05-06 RX ORDER — LISINOPRIL 10 MG/1
TABLET ORAL
Qty: 30 TABLET | Refills: 0 | Status: SHIPPED | OUTPATIENT
Start: 2018-05-06 | End: 2018-05-22 | Stop reason: SDUPTHER

## 2018-05-07 ENCOUNTER — OFFICE VISIT (OUTPATIENT)
Dept: UROLOGY | Facility: CLINIC | Age: 78
End: 2018-05-07
Payer: MEDICARE

## 2018-05-07 ENCOUNTER — LAB VISIT (OUTPATIENT)
Dept: LAB | Facility: HOSPITAL | Age: 78
End: 2018-05-07
Attending: UROLOGY
Payer: MEDICARE

## 2018-05-07 VITALS — WEIGHT: 203.06 LBS | BODY MASS INDEX: 32.64 KG/M2 | HEIGHT: 66 IN

## 2018-05-07 DIAGNOSIS — N52.9 ERECTILE DYSFUNCTION, UNSPECIFIED ERECTILE DYSFUNCTION TYPE: ICD-10-CM

## 2018-05-07 DIAGNOSIS — C61 PROSTATE CANCER: Primary | ICD-10-CM

## 2018-05-07 DIAGNOSIS — C61 PROSTATE CANCER: ICD-10-CM

## 2018-05-07 LAB
BILIRUB SERPL-MCNC: NORMAL MG/DL
BLOOD URINE, POC: NORMAL
COLOR, POC UA: YELLOW
COMPLEXED PSA SERPL-MCNC: <0.01 NG/ML
GLUCOSE UR QL STRIP: NORMAL
KETONES UR QL STRIP: NORMAL
LEUKOCYTE ESTERASE URINE, POC: NORMAL
NITRITE, POC UA: NORMAL
PH, POC UA: 6
PROTEIN, POC: NORMAL
SPECIFIC GRAVITY, POC UA: 1.01
UROBILINOGEN, POC UA: NORMAL

## 2018-05-07 PROCEDURE — 99999 PR PBB SHADOW E&M-EST. PATIENT-LVL II: CPT | Mod: PBBFAC,,, | Performed by: UROLOGY

## 2018-05-07 PROCEDURE — 84153 ASSAY OF PSA TOTAL: CPT

## 2018-05-07 PROCEDURE — 99214 OFFICE O/P EST MOD 30 MIN: CPT | Mod: S$PBB,,, | Performed by: UROLOGY

## 2018-05-07 PROCEDURE — 36415 COLL VENOUS BLD VENIPUNCTURE: CPT

## 2018-05-07 PROCEDURE — 81002 URINALYSIS NONAUTO W/O SCOPE: CPT | Mod: PBBFAC | Performed by: UROLOGY

## 2018-05-07 PROCEDURE — 99212 OFFICE O/P EST SF 10 MIN: CPT | Mod: PBBFAC | Performed by: UROLOGY

## 2018-05-07 NOTE — LETTER
May 7, 2018      Liz Hall MD  [address] [place] [address] [address]ony MORRIS [id number]           [facility]  [address] [place] [address] [address]  [place] [address]  Phone: [phone]  Fax: [phone]          Patient: Curtis Landry   MR Number: [id number]   YOB: 1940   Date of Visit: 5/7/2018       Dear Dr. Liz Hall:    Thank you for referring Curtis Landry to me for evaluation. Attached you will find relevant portions of my assessment and plan of care.    If you have questions, please do not hesitate to call me. I look forward to following Curtis Landry along with you.    Sincerely,    Curtis Paul IV, MD    Enclosure  CC:  No Recipients    If you would like to receive this communication electronically, please contact [email or web address] or [phone] to request more information on [facility] access.    For providers and/or their staff who would like to refer a patient to [facility], please contact us through our one-stop-shop provider referral line, [facility], at [phone].    If you feel you have received this communication in error or would no longer like to receive these types of communications, please e-mail [email or web address]

## 2018-05-07 NOTE — PROGRESS NOTES
Chief Complaint: Prostate Cancer    HPI:   5/7/18: No recent PSA, doing well.  Voiding fine no complaints.  Wants a HARRISON rx.  8/17/16: Doing well no interval changes.   8/14/15: 73 yo man was dx with prostate cancer about 5 years ago by Dr. Trevino with subsequent prostatectomy.  Recovered well but then had ED.  After a time he started using a HARRISON and it was fine.  Lately it has stopped working for him because the pump is broken.  He also finds his scrotum gets pulled into the device sometimes.  And that the rings need adjustment.  No gross hematuria.  Has had no prostate cancer followup in years.  No urinary bother except some occasional drips and he uses a pad for many days.       Allergies:  No known drug allergies    Medications:  has a current medication list which includes the following prescription(s): acetaminophen, aspirin, calcium carbonate-vitamin d3, carvedilol, cod liver oil, cranberry, cyanocobalamin, dorzolamide-timolol 2-0.5%, lisinopril, multivit,tx with iron,minerals, nitrostat, simvastatin, and travoprost.    Review of Systems:  General: No fever, chills, fatigability, or weight loss.  Skin: No rashes, itching, or changes in color or texture of skin.  Chest: Denies CORBIN, cyanosis, wheezing, cough, and sputum production.  Abdomen: Appetite fine. No weight loss. Denies diarrhea, abdominal pain, hematemesis, or blood in stool.  Musculoskeletal: Some joint stiffness or swelling. Some back pain.  : As above.  All other review of systems negative.    PMH:   has a past medical history of Arthritis; CHF (congestive heart failure); Glaucoma (increased eye pressure); HTN (hypertension); Hyperlipidemia; and Prostate cancer.    PSH:   has a past surgical history that includes Gallbladder surgery; Total hip arthroplasty; Cardiac pacemaker placement; Cardiac defibrillator placement; Prostatectomy; Appendectomy; and Cardiac pacemaker placement.    FamHx: family history includes Cancer in his mother.    SocHx:   reports that he quit smoking about 38 years ago. He quit after 20.00 years of use. He has never used smokeless tobacco. He reports that he does not drink alcohol or use drugs.      Physical Exam:  There were no vitals filed for this visit.  General: A&Ox3, no apparent distress, no deformities  Neck: No masses, normal thyroid  Lungs: normal inspiration, no use of accessory muscles  Heart: normal pulse, no arrhythmias  Abdomen: Soft, NT, ND  Skin: The skin is warm and dry. No jaundice.  Ext: No c/c/e.  :   8/16: Test desc ally, no abnormalities of epididymus. Penis normal, with normal penile and scrotal skin. Meatus normal. Normal rectal tone, no hemorrhoids. Prost surgically absent. Perineum and anus normal.    Labs/Studies: none today  PSA    8/15, 8/16: undetectable    Impression/Plan:   1. PSA and PSA/RTC 1 year.  2. HARRISON Rx.  Has failed PDE-5 inhibitors and HARRISON worked well for a long time.

## 2018-05-20 RX ORDER — NITROGLYCERIN 0.4 MG/1
TABLET SUBLINGUAL
Qty: 100 TABLET | Refills: 0 | Status: SHIPPED | OUTPATIENT
Start: 2018-05-20 | End: 2019-06-24 | Stop reason: SDUPTHER

## 2018-05-22 DIAGNOSIS — I50.9 CHF (NYHA CLASS III, ACC/AHA STAGE C): Chronic | ICD-10-CM

## 2018-05-22 DIAGNOSIS — I25.5 ISCHEMIC CARDIOMYOPATHY: ICD-10-CM

## 2018-05-22 DIAGNOSIS — I10 ESSENTIAL HYPERTENSION: ICD-10-CM

## 2018-05-22 RX ORDER — LISINOPRIL 10 MG/1
TABLET ORAL
Qty: 30 TABLET | Refills: 0 | Status: SHIPPED | OUTPATIENT
Start: 2018-05-22 | End: 2018-06-28 | Stop reason: SDUPTHER

## 2018-06-13 ENCOUNTER — HOSPITAL ENCOUNTER (OUTPATIENT)
Facility: HOSPITAL | Age: 78
Discharge: HOME | End: 2018-06-14
Attending: EMERGENCY MEDICINE | Admitting: INTERNAL MEDICINE
Payer: MEDICARE

## 2018-06-13 DIAGNOSIS — R07.9 CHEST PAIN, UNSPECIFIED TYPE: Primary | ICD-10-CM

## 2018-06-13 DIAGNOSIS — R07.9 CHEST PAIN: ICD-10-CM

## 2018-06-13 DIAGNOSIS — I95.9 HYPOTENSION, UNSPECIFIED HYPOTENSION TYPE: ICD-10-CM

## 2018-06-13 PROBLEM — N17.9 AKI (ACUTE KIDNEY INJURY): Status: ACTIVE | Noted: 2018-06-13

## 2018-06-13 LAB
ALBUMIN SERPL BCP-MCNC: 4 G/DL
ALP SERPL-CCNC: 42 U/L
ALT SERPL W/O P-5'-P-CCNC: 16 U/L
ANION GAP SERPL CALC-SCNC: 8 MMOL/L
AST SERPL-CCNC: 18 U/L
BASOPHILS # BLD AUTO: 0.01 K/UL
BASOPHILS NFR BLD: 0.2 %
BILIRUB SERPL-MCNC: 0.8 MG/DL
BILIRUB UR QL STRIP: NEGATIVE
BNP SERPL-MCNC: 43 PG/ML
BUN SERPL-MCNC: 15 MG/DL
CALCIUM SERPL-MCNC: 9.6 MG/DL
CHLORIDE SERPL-SCNC: 110 MMOL/L
CK SERPL-CCNC: 163 U/L
CLARITY UR: CLEAR
CO2 SERPL-SCNC: 22 MMOL/L
COLOR UR: YELLOW
CREAT SERPL-MCNC: 1.8 MG/DL
DIASTOLIC DYSFUNCTION: NO
DIFFERENTIAL METHOD: ABNORMAL
EOSINOPHIL # BLD AUTO: 0.1 K/UL
EOSINOPHIL NFR BLD: 1.1 %
ERYTHROCYTE [DISTWIDTH] IN BLOOD BY AUTOMATED COUNT: 12.4 %
EST. GFR  (AFRICAN AMERICAN): 41 ML/MIN/1.73 M^2
EST. GFR  (NON AFRICAN AMERICAN): 36 ML/MIN/1.73 M^2
ESTIMATED PA SYSTOLIC PRESSURE: 23.43
GLUCOSE SERPL-MCNC: 131 MG/DL
GLUCOSE UR QL STRIP: NEGATIVE
HCT VFR BLD AUTO: 33.1 %
HGB BLD-MCNC: 11.4 G/DL
HGB UR QL STRIP: NEGATIVE
KETONES UR QL STRIP: NEGATIVE
LEUKOCYTE ESTERASE UR QL STRIP: NEGATIVE
LYMPHOCYTES # BLD AUTO: 2.1 K/UL
LYMPHOCYTES NFR BLD: 38.2 %
MCH RBC QN AUTO: 30.8 PG
MCHC RBC AUTO-ENTMCNC: 34.4 G/DL
MCV RBC AUTO: 90 FL
MONOCYTES # BLD AUTO: 0.4 K/UL
MONOCYTES NFR BLD: 7.2 %
NEUTROPHILS # BLD AUTO: 3 K/UL
NEUTROPHILS NFR BLD: 53.3 %
NITRITE UR QL STRIP: NEGATIVE
PH UR STRIP: 6 [PH] (ref 5–8)
PLATELET # BLD AUTO: 170 K/UL
PMV BLD AUTO: 8.5 FL
POTASSIUM SERPL-SCNC: 3.6 MMOL/L
PROT SERPL-MCNC: 8 G/DL
PROT UR QL STRIP: NEGATIVE
RBC # BLD AUTO: 3.7 M/UL
RETIRED EF AND QEF - SEE NOTES: 55 (ref 55–65)
SODIUM SERPL-SCNC: 140 MMOL/L
SP GR UR STRIP: 1.02 (ref 1–1.03)
TRICUSPID VALVE REGURGITATION: NORMAL
TROPONIN I SERPL DL<=0.01 NG/ML-MCNC: 0.01 NG/ML
TROPONIN I SERPL DL<=0.01 NG/ML-MCNC: 0.02 NG/ML
URN SPEC COLLECT METH UR: NORMAL
UROBILINOGEN UR STRIP-ACNC: NEGATIVE EU/DL
WBC # BLD AUTO: 5.58 K/UL

## 2018-06-13 PROCEDURE — 96361 HYDRATE IV INFUSION ADD-ON: CPT

## 2018-06-13 PROCEDURE — 80061 LIPID PANEL: CPT

## 2018-06-13 PROCEDURE — G0378 HOSPITAL OBSERVATION PER HR: HCPCS

## 2018-06-13 PROCEDURE — 25000003 PHARM REV CODE 250: Performed by: NURSE PRACTITIONER

## 2018-06-13 PROCEDURE — 96360 HYDRATION IV INFUSION INIT: CPT | Mod: 59

## 2018-06-13 PROCEDURE — 93306 TTE W/DOPPLER COMPLETE: CPT

## 2018-06-13 PROCEDURE — 63600175 PHARM REV CODE 636 W HCPCS: Performed by: NURSE PRACTITIONER

## 2018-06-13 PROCEDURE — 96360 HYDRATION IV INFUSION INIT: CPT

## 2018-06-13 PROCEDURE — A4216 STERILE WATER/SALINE, 10 ML: HCPCS | Performed by: EMERGENCY MEDICINE

## 2018-06-13 PROCEDURE — 83880 ASSAY OF NATRIURETIC PEPTIDE: CPT

## 2018-06-13 PROCEDURE — 96374 THER/PROPH/DIAG INJ IV PUSH: CPT

## 2018-06-13 PROCEDURE — 99284 EMERGENCY DEPT VISIT MOD MDM: CPT | Mod: 25

## 2018-06-13 PROCEDURE — 80053 COMPREHEN METABOLIC PANEL: CPT

## 2018-06-13 PROCEDURE — 82550 ASSAY OF CK (CPK): CPT

## 2018-06-13 PROCEDURE — 85025 COMPLETE CBC W/AUTO DIFF WBC: CPT

## 2018-06-13 PROCEDURE — 25000003 PHARM REV CODE 250: Performed by: EMERGENCY MEDICINE

## 2018-06-13 PROCEDURE — 93306 TTE W/DOPPLER COMPLETE: CPT | Mod: 26,,, | Performed by: INTERNAL MEDICINE

## 2018-06-13 PROCEDURE — 96372 THER/PROPH/DIAG INJ SC/IM: CPT

## 2018-06-13 PROCEDURE — 81003 URINALYSIS AUTO W/O SCOPE: CPT

## 2018-06-13 PROCEDURE — 93010 ELECTROCARDIOGRAM REPORT: CPT | Mod: ,,, | Performed by: INTERNAL MEDICINE

## 2018-06-13 PROCEDURE — A4216 STERILE WATER/SALINE, 10 ML: HCPCS | Performed by: NURSE PRACTITIONER

## 2018-06-13 PROCEDURE — 96375 TX/PRO/DX INJ NEW DRUG ADDON: CPT

## 2018-06-13 PROCEDURE — 84484 ASSAY OF TROPONIN QUANT: CPT

## 2018-06-13 RX ORDER — MORPHINE SULFATE 4 MG/ML
2 INJECTION, SOLUTION INTRAMUSCULAR; INTRAVENOUS EVERY 4 HOURS PRN
Status: DISCONTINUED | OUTPATIENT
Start: 2018-06-13 | End: 2018-06-14 | Stop reason: HOSPADM

## 2018-06-13 RX ORDER — TRAVOPROST OPHTHALMIC SOLUTION 0.04 MG/ML
1 SOLUTION OPHTHALMIC NIGHTLY
Status: DISCONTINUED | OUTPATIENT
Start: 2018-06-13 | End: 2018-06-14 | Stop reason: HOSPADM

## 2018-06-13 RX ORDER — ASPIRIN 81 MG/1
81 TABLET ORAL DAILY
Status: DISCONTINUED | OUTPATIENT
Start: 2018-06-14 | End: 2018-06-14 | Stop reason: HOSPADM

## 2018-06-13 RX ORDER — NITROGLYCERIN 0.4 MG/1
0.4 TABLET SUBLINGUAL EVERY 5 MIN PRN
Status: DISCONTINUED | OUTPATIENT
Start: 2018-06-13 | End: 2018-06-14 | Stop reason: HOSPADM

## 2018-06-13 RX ORDER — SIMVASTATIN 20 MG/1
40 TABLET, FILM COATED ORAL NIGHTLY
Status: DISCONTINUED | OUTPATIENT
Start: 2018-06-13 | End: 2018-06-14 | Stop reason: HOSPADM

## 2018-06-13 RX ORDER — ENOXAPARIN SODIUM 100 MG/ML
40 INJECTION SUBCUTANEOUS EVERY 24 HOURS
Status: DISCONTINUED | OUTPATIENT
Start: 2018-06-13 | End: 2018-06-14 | Stop reason: HOSPADM

## 2018-06-13 RX ORDER — SODIUM CHLORIDE 0.9 % (FLUSH) 0.9 %
3 SYRINGE (ML) INJECTION EVERY 8 HOURS
Status: DISCONTINUED | OUTPATIENT
Start: 2018-06-13 | End: 2018-06-14

## 2018-06-13 RX ORDER — ASPIRIN 325 MG
325 TABLET ORAL DAILY
Status: DISCONTINUED | OUTPATIENT
Start: 2018-06-13 | End: 2018-06-13 | Stop reason: ALTCHOICE

## 2018-06-13 RX ORDER — DORZOLAMIDE HYDROCHLORIDE AND TIMOLOL MALEATE 20; 5 MG/ML; MG/ML
1 SOLUTION/ DROPS OPHTHALMIC 2 TIMES DAILY
Status: DISCONTINUED | OUTPATIENT
Start: 2018-06-13 | End: 2018-06-14 | Stop reason: HOSPADM

## 2018-06-13 RX ORDER — SODIUM CHLORIDE 0.9 % (FLUSH) 0.9 %
3 SYRINGE (ML) INJECTION EVERY 8 HOURS
Status: DISCONTINUED | OUTPATIENT
Start: 2018-06-13 | End: 2018-06-14 | Stop reason: HOSPADM

## 2018-06-13 RX ORDER — ACETAMINOPHEN 325 MG/1
650 TABLET ORAL EVERY 4 HOURS PRN
Status: DISCONTINUED | OUTPATIENT
Start: 2018-06-13 | End: 2018-06-14 | Stop reason: HOSPADM

## 2018-06-13 RX ORDER — ONDANSETRON 2 MG/ML
4 INJECTION INTRAMUSCULAR; INTRAVENOUS EVERY 12 HOURS PRN
Status: DISCONTINUED | OUTPATIENT
Start: 2018-06-13 | End: 2018-06-14 | Stop reason: HOSPADM

## 2018-06-13 RX ADMIN — SODIUM CHLORIDE, PRESERVATIVE FREE 3 ML: 5 INJECTION INTRAVENOUS at 05:06

## 2018-06-13 RX ADMIN — SODIUM CHLORIDE 1000 ML: 0.9 INJECTION, SOLUTION INTRAVENOUS at 12:06

## 2018-06-13 RX ADMIN — DORZOLAMIDE HYDROCHLORIDE AND TIMOLOL MALEATE 1 DROP: 20; 5 SOLUTION/ DROPS OPHTHALMIC at 11:06

## 2018-06-13 RX ADMIN — SIMVASTATIN 40 MG: 20 TABLET, FILM COATED ORAL at 08:06

## 2018-06-13 RX ADMIN — SODIUM CHLORIDE, PRESERVATIVE FREE 3 ML: 5 INJECTION INTRAVENOUS at 11:06

## 2018-06-13 RX ADMIN — TRAVOPROST 1 DROP: 0.04 SOLUTION/ DROPS OPHTHALMIC at 11:06

## 2018-06-13 RX ADMIN — ENOXAPARIN SODIUM 40 MG: 100 INJECTION SUBCUTANEOUS at 05:06

## 2018-06-13 NOTE — PLAN OF CARE
06/13/18 1441   BRADSHAW Message   Medicare Outpatient and Observation Notification regarding financial responsibility Given to patient/caregiver;Explained to patient/caregiver;Signed/date by patient/caregiver   Date BRADSHAW was signed 06/13/18   Time BRADSHAW was signed 144

## 2018-06-13 NOTE — ED PROVIDER NOTES
"SCRIBE #1 NOTE: I, Lidia Cross, am scribing for, and in the presence of, Riley Perez MD. I have scribed the entire note.      History      Chief Complaint   Patient presents with    Chest Pain     Pt states, "I started having chest pain so I took 2 nitros and I'm still having it."       Review of patient's allergies indicates:   Allergen Reactions    No known drug allergies         HPI   HPI    6/13/2018, 1:48 PM   History obtained from the patient and relative      History of Present Illness: Curtis Landry is a 77 y.o. male patient who presents to the Emergency Department for CP which onset suddenly 30 minutes PTA. Pt describes CP as a "pressure" in his chest. Pt reports taking 2 nitroglycerin, w/ relief, but pt started experiencing dizziness and weakness. Sxs are constant and moderate in severity. There are no mitigating or exacerbating factors noted. Pt denies any palpitations, SOB, cough, nausea, vomiting, abdominal pain, headache, and all other sxs at this time. No further complaints or concerns at this time.           Arrival mode: Personal vehicle    PCP: Liz Hall MD       Past Medical History:  Past Medical History:   Diagnosis Date    Arthritis     CHF (congestive heart failure)     Glaucoma (increased eye pressure)     Followed by outside opthalmology    HTN (hypertension)     Hyperlipidemia     Macular degeneration     Prostate cancer        Past Surgical History:  Past Surgical History:   Procedure Laterality Date    APPENDECTOMY      CARDIAC DEFIBRILLATOR PLACEMENT      CARDIAC PACEMAKER PLACEMENT      CARDIAC PACEMAKER PLACEMENT      GALLBLADDER SURGERY      PROSTATECTOMY      TOTAL HIP ARTHROPLASTY           Family History:  Family History   Problem Relation Age of Onset    Cancer Mother        Social History:  Social History     Social History Main Topics    Smoking status: Former Smoker     Years: 20.00     Quit date: 1/1/1980    Smokeless tobacco: Never Used    " Alcohol use No    Drug use: No    Sexual activity: Unknown       ROS   Review of Systems   Constitutional: Negative for chills, diaphoresis, fatigue and fever.   HENT: Negative for congestion and sore throat.    Respiratory: Negative for cough and shortness of breath.    Cardiovascular: Positive for chest pain. Negative for palpitations and leg swelling.   Gastrointestinal: Negative for abdominal pain, diarrhea, nausea and vomiting.   Genitourinary: Negative for dysuria.   Musculoskeletal: Negative for back pain.   Skin: Negative for rash.   Neurological: Positive for dizziness and weakness. Negative for light-headedness and headaches.   Hematological: Does not bruise/bleed easily.       Physical Exam      Initial Vitals [06/13/18 1241]   BP Pulse Resp Temp SpO2   (!) 79/50 (!) 56 20 98 °F (36.7 °C) (!) 93 %      MAP       --          Physical Exam  Nursing Notes and Vital Signs Reviewed.  Constitutional: Patient is in mild distress. Elderly.  Head: Atraumatic. Normocephalic.  Eyes: PERRL. EOM intact. Conjunctivae are not pale. No scleral icterus.  ENT: Mucous membranes are moist. Oropharynx is clear and symmetric.    Neck: Supple. Full ROM. No lymphadenopathy.  Cardiovascular: Bradycardic. Regular rhythm. No murmurs, rubs, or gallops. Distal pulses are 2+ and symmetric.  Pulmonary/Chest: No respiratory distress. Clear to auscultation bilaterally. No wheezing or rales.  Abdominal: Soft and non-distended.  There is no tenderness.  Musculoskeletal: Moves all extremities. No obvious deformities. No edema.   Skin: Warm and dry.  Neurological:  Alert, awake, and appropriate.  Normal speech.  No acute focal neurological deficits are appreciated.  Psychiatric: Normal affect. Good eye contact. Appropriate in content.    ED Course    Procedures  ED Vital Signs:  Vitals:    06/13/18 1241 06/13/18 1246 06/13/18 1247 06/13/18 1248   BP: (!) 79/50  (!) 87/53    Pulse: (!) 56 71 65    Resp: 20      Temp: 98 °F (36.7 °C)     "  TempSrc: Oral      SpO2: (!) 93% 98% 98%    Weight:    92.5 kg (204 lb)   Height:    5' 9" (1.753 m)    06/13/18 1411   BP: 112/60   Pulse: 64   Resp: (!) 22   Temp:    TempSrc:    SpO2: 100%   Weight:    Height:        Abnormal Lab Results:  Labs Reviewed   CBC W/ AUTO DIFFERENTIAL - Abnormal; Notable for the following:        Result Value    RBC 3.70 (*)     Hemoglobin 11.4 (*)     Hematocrit 33.1 (*)     MPV 8.5 (*)     All other components within normal limits   COMPREHENSIVE METABOLIC PANEL - Abnormal; Notable for the following:     CO2 22 (*)     Glucose 131 (*)     Creatinine 1.8 (*)     Alkaline Phosphatase 42 (*)     eGFR if  41 (*)     eGFR if non  36 (*)     All other components within normal limits   B-TYPE NATRIURETIC PEPTIDE   CK   TROPONIN I   URINALYSIS        All Lab Results:  Results for orders placed or performed during the hospital encounter of 06/13/18   CBC auto differential   Result Value Ref Range    WBC 5.58 3.90 - 12.70 K/uL    RBC 3.70 (L) 4.60 - 6.20 M/uL    Hemoglobin 11.4 (L) 14.0 - 18.0 g/dL    Hematocrit 33.1 (L) 40.0 - 54.0 %    MCV 90 82 - 98 fL    MCH 30.8 27.0 - 31.0 pg    MCHC 34.4 32.0 - 36.0 g/dL    RDW 12.4 11.5 - 14.5 %    Platelets 170 150 - 350 K/uL    MPV 8.5 (L) 9.2 - 12.9 fL    Gran # (ANC) 3.0 1.8 - 7.7 K/uL    Lymph # 2.1 1.0 - 4.8 K/uL    Mono # 0.4 0.3 - 1.0 K/uL    Eos # 0.1 0.0 - 0.5 K/uL    Baso # 0.01 0.00 - 0.20 K/uL    Gran% 53.3 38.0 - 73.0 %    Lymph% 38.2 18.0 - 48.0 %    Mono% 7.2 4.0 - 15.0 %    Eosinophil% 1.1 0.0 - 8.0 %    Basophil% 0.2 0.0 - 1.9 %    Differential Method Automated    Comprehensive metabolic panel   Result Value Ref Range    Sodium 140 136 - 145 mmol/L    Potassium 3.6 3.5 - 5.1 mmol/L    Chloride 110 95 - 110 mmol/L    CO2 22 (L) 23 - 29 mmol/L    Glucose 131 (H) 70 - 110 mg/dL    BUN, Bld 15 8 - 23 mg/dL    Creatinine 1.8 (H) 0.5 - 1.4 mg/dL    Calcium 9.6 8.7 - 10.5 mg/dL    Total Protein 8.0 6.0 - " 8.4 g/dL    Albumin 4.0 3.5 - 5.2 g/dL    Total Bilirubin 0.8 0.1 - 1.0 mg/dL    Alkaline Phosphatase 42 (L) 55 - 135 U/L    AST 18 10 - 40 U/L    ALT 16 10 - 44 U/L    Anion Gap 8 8 - 16 mmol/L    eGFR if African American 41 (A) >60 mL/min/1.73 m^2    eGFR if non African American 36 (A) >60 mL/min/1.73 m^2   Brain natriuretic peptide   Result Value Ref Range    BNP 43 0 - 99 pg/mL   CK   Result Value Ref Range     20 - 200 U/L   Troponin I   Result Value Ref Range    Troponin I 0.021 0.000 - 0.026 ng/mL         Imaging Results:  Imaging Results          X-Ray Chest AP Portable (Final result)  Result time 06/13/18 13:08:31    Final result by Live Ibrahim MD (06/13/18 13:08:31)                 Impression:      No acute process seen.      Electronically signed by: Live Ibrahim MD  Date:    06/13/2018  Time:    13:08             Narrative:    EXAMINATION:  XR CHEST AP PORTABLE    CLINICAL HISTORY:  chest pain;    FINDINGS:  Single view of the chest.  Comparison 02/10/2017.  Left-sided pacing device demonstrates similar configuration.    Cardiac silhouette is normal.  The lungs demonstrate no evidence of active disease.  No evidence of pleural effusion or pneumothorax.  Bones appear intact.                               The EKG was ordered, reviewed, and independently interpreted by the ED provider.  Interpretation time: 12:46  Rate: 68 BPM  Rhythm: normal sinus rhythm  Interpretation: L axis deviation. No STEMI.           The Emergency Provider reviewed the vital signs and test results, which are outlined above.    ED Discussion     2:09 PM: Discussed w/ pt about admission and pt agrees with plan.    1:48 PM: Discussed case with JOSLYN Michel (Hospital Medicine), who agrees with current care and management of pt and accepts admission.   Admitting Service: Hospital medicine   Admitting Physician: Dr. Grimaldo  Admit to: Obs    2:09 PM: Re-evaluated pt. I have discussed test results, shared treatment  plan, and the need for admission with patient and family at bedside. Pt and family express understanding at this time and agree with all information. All questions answered. Pt and family have no further questions or concerns at this time. Pt is ready for admit.        ED Medication(s):  Medications   sodium chloride 0.9% bolus 1,000 mL (1,000 mLs Intravenous New Bag 6/13/18 1250)       New Prescriptions    No medications on file             Medical Decision Making    Medical Decision Making:   Clinical Tests:   Lab Tests: Ordered and Reviewed  Radiological Study: Ordered and Reviewed  Medical Tests: Ordered and Reviewed           Scribe Attestation:   Scribe #1: I performed the above scribed service and the documentation accurately describes the services I performed. I attest to the accuracy of the note.    Attending:   Physician Attestation Statement for Scribe #1: I, Riley Perez MD, personally performed the services described in this documentation, as scribed by Lidia Cross, in my presence, and it is both accurate and complete.          Clinical Impression       ICD-10-CM ICD-9-CM   1. Chest pain, unspecified type R07.9 786.50   2. Hypotension, unspecified hypotension type I95.9 458.9       Disposition:   Disposition: Placed in Observation  Condition: Fair         Riley Perez MD  06/13/18 3045

## 2018-06-13 NOTE — ASSESSMENT & PLAN NOTE
--hypotension vs anxiety vs ACS  --CP resolved at present  --PRN nitroglycerine  --trend troponins  --2D echo pending  --cardiac monitoring  --consider cardiology consult if indicated

## 2018-06-13 NOTE — H&P
"[facility] Hospital Medicine  History & Physical    Patient Name: Curtis Landry  MRN: [id number]  Admission Date: 6/13/2018  Attending Physician: Riley Perez MD   Primary Care Provider: Liz Hall MD         Patient information was obtained from patient, relative(s), past medical records and ER records.     Subjective:     Principal Problem:Chest pain    Chief Complaint:   Chief Complaint   Patient presents with    Chest Pain     Pt states, "I started having chest pain so I took 2 nitros and I'm still having it."        HPI: 78 y/o male with PMHx of CHF & HTN who presented to the ed with c/o CP that onset suddenly earlier today. Pain is described as sharp pain to right side of chest 8/10 on pain scale. It does not radiate. Associated symptoms include diaphoresis, nausea, SOB, dizziness, and weakness. Symptoms are constant and moderate. No exacerbating factors. Pt took 2 nitroglycerine with mild relief. Pt denies fever, chills, palpitations, cough, vomiting, diarrhea, fall, LOC, confusion, and all other symptoms at this time. ED workup shows hypotension 87/53, Cr. 1.8, eGFR 41, H/H 11.4/33.1. Troponin is WNL. CXR: no acute process seen. EKG shows NSR, L axis deviation. No STEMI. Patient will be kept for OBS for Chest Pain under the care of Hospital Medicine.  Patient is a full code and his surrogate decision maker is his cousin, Live Birch [phone]      Past Medical History:   Diagnosis Date    Arthritis     CHF (congestive heart failure)     Glaucoma (increased eye pressure)     Followed by outside opthalmology    HTN (hypertension)     Hyperlipidemia     Macular degeneration     Prostate cancer        Past Surgical History:   Procedure Laterality Date    APPENDECTOMY      CARDIAC DEFIBRILLATOR PLACEMENT      CARDIAC PACEMAKER PLACEMENT      CARDIAC PACEMAKER PLACEMENT      GALLBLADDER SURGERY      PROSTATECTOMY      TOTAL HIP ARTHROPLASTY         Review of " patient's allergies indicates:   Allergen Reactions    No known drug allergies        No current facility-administered medications on file prior to encounter.      Current Outpatient Prescriptions on File Prior to Encounter   Medication Sig    aspirin (ECOTRIN) 81 MG EC tablet Take by mouth. 1 Tablet, Delayed Release (E.C.) Oral Every day    carvedilol (COREG) 25 MG tablet TAKE 1 TABLET (25 MG TOTAL) BY MOUTH 2 (TWO) TIMES DAILY.    COD LIVER OIL ORAL Take by mouth.    cranberry 500 mg Cap Take 1 capsule by mouth daily as needed.    cyanocobalamin (VITAMIN B-12) 1000 MCG tablet Take by mouth. 1 Tablet Oral Every day    dorzolamide-timolol 2-0.5% (COSOPT) 22.3-6.8 mg/mL ophthalmic solution Place 1 drop into both eyes 2 (two) times daily.    lisinopril 10 MG tablet TAKE ONE TABLET BY MOUTH ONE TIME DAILY    MULTIVITS,TH W-FE,OTHER MIN (COMPLETE MULTIVITAMIN ORAL) Take 1 tablet by mouth once daily.    nitroGLYCERIN (NITROSTAT) 0.4 MG SL tablet PLACE 1 TABLET BY MOUTH UNDER TONGUE EVERY 5 MINUTES AS NEEDED FOR CHEST PAIN    simvastatin (ZOCOR) 40 MG tablet Take 1 tablet (40 mg total) by mouth every evening.    travoprost (TRAVATAN Z) 0.004 % Drop Inject into the eye. 1 Drops Ophthalmic At bedtime    acetaminophen (TYLENOL) 650 MG TbSR Take 1 tablet (650 mg total) by mouth every 8 (eight) hours.    calcium carbonate-vitamin D3 (CALCIUM 500 WITH D) 500 mg(1,250mg) -400 unit Tab Take by mouth.     Family History     Problem Relation (Age of Onset)    Cancer Mother        Social History Main Topics    Smoking status: Former Smoker     Years: 20.00     Quit date: 1/1/1980    Smokeless tobacco: Never Used    Alcohol use No    Drug use: No    Sexual activity: Not on file     Review of Systems   Constitutional: Positive for diaphoresis and fatigue. Negative for activity change, appetite change, chills and fever.   HENT: Negative for congestion, rhinorrhea and sore throat.    Eyes: Negative for pain.        Pt  is blind   Respiratory: Positive for shortness of breath. Negative for cough and wheezing.    Cardiovascular: Positive for chest pain. Negative for palpitations and leg swelling.   Gastrointestinal: Positive for nausea. Negative for abdominal distention, abdominal pain, constipation, diarrhea and vomiting.   Endocrine: Negative for cold intolerance and heat intolerance.   Genitourinary: Negative for difficulty urinating, dysuria and hematuria.   Musculoskeletal: Negative for arthralgias, back pain and gait problem.   Skin: Negative for color change and wound.   Neurological: Positive for dizziness, weakness and light-headedness. Negative for facial asymmetry, speech difficulty and headaches.   Hematological: Negative.    Psychiatric/Behavioral: Negative for agitation, behavioral problems and confusion.     Objective:     Vital Signs (Most Recent):  Temp: 98 °F (36.7 °C) (06/13/18 1241)  Pulse: 81 (06/13/18 1504)  Resp: (!) 22 (06/13/18 1411)  BP: 127/65 (06/13/18 1504)  SpO2: 100 % (06/13/18 1411) Vital Signs (24h Range):  Temp:  [98 °F (36.7 °C)] 98 °F (36.7 °C)  Pulse:  [56-81] 81  Resp:  [20-22] 22  SpO2:  [93 %-100 %] 100 %  BP: ()/(50-65) 127/65     Weight: 92.5 kg (204 lb)  Body mass index is 30.13 kg/m².    Physical Exam   Constitutional: He is oriented to person, place, and time. He appears well-developed and well-nourished. No distress.   HENT:   Head: Normocephalic and atraumatic.   Nose: Nose normal.   Mouth/Throat: Oropharynx is clear and moist.   Eyes: Conjunctivae are normal. No scleral icterus.   Impaired vision - wears dark glasses   Neck: Normal range of motion. Neck supple. No JVD present.   Cardiovascular: Normal rate, regular rhythm, normal heart sounds and intact distal pulses.  Exam reveals no gallop and no friction rub.    No murmur heard.  Pulmonary/Chest: Effort normal and breath sounds normal. No respiratory distress. He has no wheezes.   Abdominal: Bowel sounds are normal. He  exhibits no distension. There is no tenderness.   Genitourinary:   Genitourinary Comments: Urinates independently   Musculoskeletal: Normal range of motion. He exhibits no edema.   Neurological: He is alert and oriented to person, place, and time. No cranial nerve deficit. Coordination normal.   Skin: Skin is warm and dry. Capillary refill takes 2 to 3 seconds. He is not diaphoretic. No erythema.   Psychiatric: He has a normal mood and affect. His behavior is normal. Judgment and thought content normal.   Nursing note and vitals reviewed.          Significant Labs:   Recent Lab Results       06/13/18  1251      Albumin 4.0     Alkaline Phosphatase 42(L)     ALT 16     Anion Gap 8     AST 18     Baso # 0.01     Basophil% 0.2     Total Bilirubin 0.8  Comment:  For infants and newborns, interpretation of results should be based  on gestational age, weight and in agreement with clinical  observations.  Premature Infant recommended reference ranges:  Up to 24 hours.............<8.0 mg/dL  Up to 48 hours............<12.0 mg/dL  3-5 days..................<15.0 mg/dL  6-29 days.................<15.0 mg/dL       BNP 43  Comment:  Values of less than 100 pg/ml are consistent with non-CHF populations.     BUN, Bld 15     Calcium 9.6     Chloride 110     CO2 22(L)          Creatinine 1.8(H)     Differential Method Automated     eGFR if  41(A)     eGFR if non  36  Comment:  Calculation used to obtain the estimated glomerular filtration  rate (eGFR) is the CKD-EPI equation.   (A)     Eos # 0.1     Eosinophil% 1.1     Glucose 131(H)     Gran # (ANC) 3.0     Gran% 53.3     Hematocrit 33.1(L)     Hemoglobin 11.4(L)     Lymph # 2.1     Lymph% 38.2     MCH 30.8     MCHC 34.4     MCV 90     Mono # 0.4     Mono% 7.2     MPV 8.5(L)     Platelets 170     Potassium 3.6     Total Protein 8.0     RBC 3.70(L)     RDW 12.4     Sodium 140     Troponin I 0.021  Comment:  The reference interval for Troponin  I represents the 99th percentile   cutoff   for our facility and is consistent with 3rd generation assay   performance.       WBC 5.58         All pertinent labs within the past 24 hours have been reviewed.    Significant Imaging: I have reviewed all pertinent imaging results/findings within the past 24 hours.   Imaging Results          X-Ray Chest AP Portable (Final result)  Result time 06/13/18 13:08:31    Final result by Live Ibrahim MD (06/13/18 13:08:31)                 Impression:      No acute process seen.      Electronically signed by: Live Ibrahim MD  Date:    06/13/2018  Time:    13:08             Narrative:    EXAMINATION:  XR CHEST AP PORTABLE    CLINICAL HISTORY:  chest pain;    FINDINGS:  Single view of the chest.  Comparison 02/10/2017.  Left-sided pacing device demonstrates similar configuration.    Cardiac silhouette is normal.  The lungs demonstrate no evidence of active disease.  No evidence of pleural effusion or pneumothorax.  Bones appear intact.                                  Assessment/Plan:     * Chest pain    --hypotension vs anxiety vs ACS  --CP resolved at present  --PRN nitroglycerine  --trend troponins  --2D echo pending  --cardiac monitoring  --consider cardiology consult if indicated          LALITO (acute kidney injury)    --Cr 1.8, baseline is 1.0  --IVF's  --monitor BMP          Essential hypertension    --hypotensive at present  --hold htn meds for now and restart as indicated  --cardiac diet          CHF (NYHA class III, ACC/AHA stage C)    --hold coreg and lisinopril for hypotension  --cardiac monitoring  --2D echo pending          Hyperlipidemia    --continue statin  --cardiac diet  --lipid panel pending          VTE Risk Mitigation         Ordered     enoxaparin injection 40 mg  Daily      06/13/18 1531     IP VTE HIGH RISK PATIENT  Once      06/13/18 1454     Place sequential compression device  Until discontinued      06/13/18 1454             Loree Mcconnell,  FNP-C  Department of Hospital Medicine   [facility]

## 2018-06-13 NOTE — HPI
76 y/o male with PMHx of CHF & HTN who presented to the ed with c/o CP that onset suddenly earlier today. Pain is described as sharp pain to right side of chest 8/10 on pain scale. It does not radiate. Associated symptoms include diaphoresis, nausea, SOB, dizziness, and weakness. Symptoms are constant and moderate. No exacerbating factors. Pt took 2 nitroglycerine with mild relief. Pt denies fever, chills, palpitations, cough, vomiting, diarrhea, fall, LOC, confusion, and all other symptoms at this time. ED workup shows hypotension 87/53, Cr. 1.8, eGFR 41, H/H 11.4/33.1. Troponin is WNL. CXR: no acute process seen. EKG shows NSR, L axis deviation. No STEMI. Patient will be kept for OBS for Chest Pain under the care of Hospital Medicine.  Patient is a full code and his surrogate decision maker is his cousin, Live Birch [phone]

## 2018-06-13 NOTE — SUBJECTIVE & OBJECTIVE
Past Medical History:   Diagnosis Date    Arthritis     CHF (congestive heart failure)     Glaucoma (increased eye pressure)     Followed by outside opthalmology    HTN (hypertension)     Hyperlipidemia     Macular degeneration     Prostate cancer        Past Surgical History:   Procedure Laterality Date    APPENDECTOMY      CARDIAC DEFIBRILLATOR PLACEMENT      CARDIAC PACEMAKER PLACEMENT      CARDIAC PACEMAKER PLACEMENT      GALLBLADDER SURGERY      PROSTATECTOMY      TOTAL HIP ARTHROPLASTY         Review of patient's allergies indicates:   Allergen Reactions    No known drug allergies        No current facility-administered medications on file prior to encounter.      Current Outpatient Prescriptions on File Prior to Encounter   Medication Sig    aspirin (ECOTRIN) 81 MG EC tablet Take by mouth. 1 Tablet, Delayed Release (E.C.) Oral Every day    carvedilol (COREG) 25 MG tablet TAKE 1 TABLET (25 MG TOTAL) BY MOUTH 2 (TWO) TIMES DAILY.    COD LIVER OIL ORAL Take by mouth.    cranberry 500 mg Cap Take 1 capsule by mouth daily as needed.    cyanocobalamin (VITAMIN B-12) 1000 MCG tablet Take by mouth. 1 Tablet Oral Every day    dorzolamide-timolol 2-0.5% (COSOPT) 22.3-6.8 mg/mL ophthalmic solution Place 1 drop into both eyes 2 (two) times daily.    lisinopril 10 MG tablet TAKE ONE TABLET BY MOUTH ONE TIME DAILY    MULTIVITS,TH W-FE,OTHER MIN (COMPLETE MULTIVITAMIN ORAL) Take 1 tablet by mouth once daily.    nitroGLYCERIN (NITROSTAT) 0.4 MG SL tablet PLACE 1 TABLET BY MOUTH UNDER TONGUE EVERY 5 MINUTES AS NEEDED FOR CHEST PAIN    simvastatin (ZOCOR) 40 MG tablet Take 1 tablet (40 mg total) by mouth every evening.    travoprost (TRAVATAN Z) 0.004 % Drop Inject into the eye. 1 Drops Ophthalmic At bedtime    acetaminophen (TYLENOL) 650 MG TbSR Take 1 tablet (650 mg total) by mouth every 8 (eight) hours.    calcium carbonate-vitamin D3 (CALCIUM 500 WITH D) 500 mg(1,250mg) -400 unit Tab Take by  mouth.     Family History     Problem Relation (Age of Onset)    Cancer Mother        Social History Main Topics    Smoking status: Former Smoker     Years: 20.00     Quit date: 1/1/1980    Smokeless tobacco: Never Used    Alcohol use No    Drug use: No    Sexual activity: Not on file     Review of Systems   Constitutional: Positive for diaphoresis and fatigue. Negative for activity change, appetite change, chills and fever.   HENT: Negative for congestion, rhinorrhea and sore throat.    Eyes: Negative for pain.        Pt is blind   Respiratory: Positive for shortness of breath. Negative for cough and wheezing.    Cardiovascular: Positive for chest pain. Negative for palpitations and leg swelling.   Gastrointestinal: Positive for nausea. Negative for abdominal distention, abdominal pain, constipation, diarrhea and vomiting.   Endocrine: Negative for cold intolerance and heat intolerance.   Genitourinary: Negative for difficulty urinating, dysuria and hematuria.   Musculoskeletal: Negative for arthralgias, back pain and gait problem.   Skin: Negative for color change and wound.   Neurological: Positive for dizziness, weakness and light-headedness. Negative for facial asymmetry, speech difficulty and headaches.   Hematological: Negative.    Psychiatric/Behavioral: Negative for agitation, behavioral problems and confusion.     Objective:     Vital Signs (Most Recent):  Temp: 98 °F (36.7 °C) (06/13/18 1241)  Pulse: 81 (06/13/18 1504)  Resp: (!) 22 (06/13/18 1411)  BP: 127/65 (06/13/18 1504)  SpO2: 100 % (06/13/18 1411) Vital Signs (24h Range):  Temp:  [98 °F (36.7 °C)] 98 °F (36.7 °C)  Pulse:  [56-81] 81  Resp:  [20-22] 22  SpO2:  [93 %-100 %] 100 %  BP: ()/(50-65) 127/65     Weight: 92.5 kg (204 lb)  Body mass index is 30.13 kg/m².    Physical Exam   Constitutional: He is oriented to person, place, and time. He appears well-developed and well-nourished. No distress.   HENT:   Head: Normocephalic and  atraumatic.   Nose: Nose normal.   Mouth/Throat: Oropharynx is clear and moist.   Eyes: Conjunctivae are normal. No scleral icterus.   Impaired vision - wears dark glasses   Neck: Normal range of motion. Neck supple. No JVD present.   Cardiovascular: Normal rate, regular rhythm, normal heart sounds and intact distal pulses.  Exam reveals no gallop and no friction rub.    No murmur heard.  Pulmonary/Chest: Effort normal and breath sounds normal. No respiratory distress. He has no wheezes.   Abdominal: Bowel sounds are normal. He exhibits no distension. There is no tenderness.   Genitourinary:   Genitourinary Comments: Urinates independently   Musculoskeletal: Normal range of motion. He exhibits no edema.   Neurological: He is alert and oriented to person, place, and time. No cranial nerve deficit. Coordination normal.   Skin: Skin is warm and dry. Capillary refill takes 2 to 3 seconds. He is not diaphoretic. No erythema.   Psychiatric: He has a normal mood and affect. His behavior is normal. Judgment and thought content normal.   Nursing note and vitals reviewed.          Significant Labs:   Recent Lab Results       06/13/18  1251      Albumin 4.0     Alkaline Phosphatase 42(L)     ALT 16     Anion Gap 8     AST 18     Baso # 0.01     Basophil% 0.2     Total Bilirubin 0.8  Comment:  For infants and newborns, interpretation of results should be based  on gestational age, weight and in agreement with clinical  observations.  Premature Infant recommended reference ranges:  Up to 24 hours.............<8.0 mg/dL  Up to 48 hours............<12.0 mg/dL  3-5 days..................<15.0 mg/dL  6-29 days.................<15.0 mg/dL       BNP 43  Comment:  Values of less than 100 pg/ml are consistent with non-CHF populations.     BUN, Bld 15     Calcium 9.6     Chloride 110     CO2 22(L)          Creatinine 1.8(H)     Differential Method Automated     eGFR if  41(A)     eGFR if non   36  Comment:  Calculation used to obtain the estimated glomerular filtration  rate (eGFR) is the CKD-EPI equation.   (A)     Eos # 0.1     Eosinophil% 1.1     Glucose 131(H)     Gran # (ANC) 3.0     Gran% 53.3     Hematocrit 33.1(L)     Hemoglobin 11.4(L)     Lymph # 2.1     Lymph% 38.2     MCH 30.8     MCHC 34.4     MCV 90     Mono # 0.4     Mono% 7.2     MPV 8.5(L)     Platelets 170     Potassium 3.6     Total Protein 8.0     RBC 3.70(L)     RDW 12.4     Sodium 140     Troponin I 0.021  Comment:  The reference interval for Troponin I represents the 99th percentile   cutoff   for our facility and is consistent with 3rd generation assay   performance.       WBC 5.58         All pertinent labs within the past 24 hours have been reviewed.    Significant Imaging: I have reviewed all pertinent imaging results/findings within the past 24 hours.   Imaging Results          X-Ray Chest AP Portable (Final result)  Result time 06/13/18 13:08:31    Final result by Live Ibrahim MD (06/13/18 13:08:31)                 Impression:      No acute process seen.      Electronically signed by: Live Ibrahim MD  Date:    06/13/2018  Time:    13:08             Narrative:    EXAMINATION:  XR CHEST AP PORTABLE    CLINICAL HISTORY:  chest pain;    FINDINGS:  Single view of the chest.  Comparison 02/10/2017.  Left-sided pacing device demonstrates similar configuration.    Cardiac silhouette is normal.  The lungs demonstrate no evidence of active disease.  No evidence of pleural effusion or pneumothorax.  Bones appear intact.

## 2018-06-14 VITALS
HEIGHT: 69 IN | WEIGHT: 204 LBS | OXYGEN SATURATION: 100 % | HEART RATE: 69 BPM | SYSTOLIC BLOOD PRESSURE: 135 MMHG | RESPIRATION RATE: 18 BRPM | BODY MASS INDEX: 30.21 KG/M2 | TEMPERATURE: 98 F | DIASTOLIC BLOOD PRESSURE: 67 MMHG

## 2018-06-14 LAB
ANION GAP SERPL CALC-SCNC: 9 MMOL/L
BUN SERPL-MCNC: 13 MG/DL
CALCIUM SERPL-MCNC: 9.7 MG/DL
CHLORIDE SERPL-SCNC: 107 MMOL/L
CHOLEST SERPL-MCNC: 139 MG/DL
CHOLEST/HDLC SERPL: 5.1 {RATIO}
CO2 SERPL-SCNC: 24 MMOL/L
CREAT SERPL-MCNC: 1 MG/DL
DIASTOLIC DYSFUNCTION: NO
EST. GFR  (AFRICAN AMERICAN): >60 ML/MIN/1.73 M^2
EST. GFR  (NON AFRICAN AMERICAN): >60 ML/MIN/1.73 M^2
GLUCOSE SERPL-MCNC: 101 MG/DL
HDLC SERPL-MCNC: 27 MG/DL
HDLC SERPL: 19.4 %
LDLC SERPL CALC-MCNC: 55.8 MG/DL
NONHDLC SERPL-MCNC: 112 MG/DL
POTASSIUM SERPL-SCNC: 3.9 MMOL/L
SODIUM SERPL-SCNC: 140 MMOL/L
TRIGL SERPL-MCNC: 281 MG/DL
TROPONIN I SERPL DL<=0.01 NG/ML-MCNC: 0.01 NG/ML
TROPONIN I SERPL DL<=0.01 NG/ML-MCNC: 0.03 NG/ML

## 2018-06-14 PROCEDURE — 93018 CV STRESS TEST I&R ONLY: CPT | Mod: ,,, | Performed by: INTERNAL MEDICINE

## 2018-06-14 PROCEDURE — 25000003 PHARM REV CODE 250: Performed by: NURSE PRACTITIONER

## 2018-06-14 PROCEDURE — 84484 ASSAY OF TROPONIN QUANT: CPT | Mod: 91

## 2018-06-14 PROCEDURE — 93017 CV STRESS TEST TRACING ONLY: CPT

## 2018-06-14 PROCEDURE — 63600175 PHARM REV CODE 636 W HCPCS: Performed by: PHYSICIAN ASSISTANT

## 2018-06-14 PROCEDURE — 80048 BASIC METABOLIC PNL TOTAL CA: CPT

## 2018-06-14 PROCEDURE — 78452 HT MUSCLE IMAGE SPECT MULT: CPT | Mod: 26,,, | Performed by: INTERNAL MEDICINE

## 2018-06-14 PROCEDURE — A4216 STERILE WATER/SALINE, 10 ML: HCPCS | Performed by: NURSE PRACTITIONER

## 2018-06-14 PROCEDURE — G0378 HOSPITAL OBSERVATION PER HR: HCPCS

## 2018-06-14 PROCEDURE — 99900035 HC TECH TIME PER 15 MIN (STAT)

## 2018-06-14 PROCEDURE — 84484 ASSAY OF TROPONIN QUANT: CPT

## 2018-06-14 PROCEDURE — 63600175 PHARM REV CODE 636 W HCPCS: Performed by: INTERNAL MEDICINE

## 2018-06-14 PROCEDURE — 93016 CV STRESS TEST SUPVJ ONLY: CPT | Mod: ,,, | Performed by: INTERNAL MEDICINE

## 2018-06-14 PROCEDURE — 36415 COLL VENOUS BLD VENIPUNCTURE: CPT

## 2018-06-14 RX ORDER — SODIUM CHLORIDE 9 MG/ML
INJECTION, SOLUTION INTRAVENOUS CONTINUOUS
Status: DISCONTINUED | OUTPATIENT
Start: 2018-06-14 | End: 2018-06-14 | Stop reason: HOSPADM

## 2018-06-14 RX ORDER — REGADENOSON 0.08 MG/ML
0.4 INJECTION, SOLUTION INTRAVENOUS ONCE
Status: COMPLETED | OUTPATIENT
Start: 2018-06-14 | End: 2018-06-14

## 2018-06-14 RX ORDER — CARVEDILOL 12.5 MG/1
25 TABLET ORAL 2 TIMES DAILY
Status: DISCONTINUED | OUTPATIENT
Start: 2018-06-14 | End: 2018-06-14 | Stop reason: HOSPADM

## 2018-06-14 RX ORDER — HYDRALAZINE HYDROCHLORIDE 20 MG/ML
10 INJECTION INTRAMUSCULAR; INTRAVENOUS EVERY 8 HOURS PRN
Status: DISCONTINUED | OUTPATIENT
Start: 2018-06-14 | End: 2018-06-14 | Stop reason: HOSPADM

## 2018-06-14 RX ADMIN — SODIUM CHLORIDE, PRESERVATIVE FREE 3 ML: 5 INJECTION INTRAVENOUS at 04:06

## 2018-06-14 RX ADMIN — SODIUM CHLORIDE: 0.9 INJECTION, SOLUTION INTRAVENOUS at 04:06

## 2018-06-14 RX ADMIN — DORZOLAMIDE HYDROCHLORIDE AND TIMOLOL MALEATE 1 DROP: 20; 5 SOLUTION/ DROPS OPHTHALMIC at 09:06

## 2018-06-14 RX ADMIN — ASPIRIN 81 MG: 81 TABLET, COATED ORAL at 09:06

## 2018-06-14 RX ADMIN — REGADENOSON 0.4 MG: 0.08 INJECTION, SOLUTION INTRAVENOUS at 03:06

## 2018-06-14 RX ADMIN — CARVEDILOL 25 MG: 12.5 TABLET, FILM COATED ORAL at 04:06

## 2018-06-14 RX ADMIN — HYDRALAZINE HYDROCHLORIDE 10 MG: 20 INJECTION INTRAMUSCULAR; INTRAVENOUS at 12:06

## 2018-06-14 NOTE — PROGRESS NOTES
Patient arrived to room via stretcher from ER accompanied by Tabitha RIDER. Transferred to bed 231. Patient is aaox4 with no distress noted.  Assessment as charted. Patient has been orientated to room, call light, fall precautions, rounding sheet, and board. Heart monitor in place, vital signs taken, no questions or concerns at this time.

## 2018-06-14 NOTE — SUBJECTIVE & OBJECTIVE
Review of Systems   Constitutional: Negative for appetite change, chills, diaphoresis, fatigue and fever.   HENT: Negative for congestion, nosebleeds, sore throat and trouble swallowing.    Eyes: Negative for pain, discharge and visual disturbance.   Respiratory: Negative for apnea, cough, chest tightness, shortness of breath, wheezing and stridor.    Cardiovascular: Negative for chest pain, palpitations and leg swelling.   Gastrointestinal: Negative for abdominal distention, abdominal pain, blood in stool, constipation, diarrhea, nausea and vomiting.   Endocrine: Negative for cold intolerance and heat intolerance.   Genitourinary: Negative for difficulty urinating, dysuria, flank pain, frequency and urgency.   Musculoskeletal: Negative for arthralgias, back pain, joint swelling, myalgias, neck pain and neck stiffness.   Skin: Negative for rash and wound.   Allergic/Immunologic: Negative for food allergies and immunocompromised state.   Neurological: Negative for dizziness, seizures, syncope, facial asymmetry, weakness, light-headedness and headaches.   Hematological: Negative for adenopathy.   Psychiatric/Behavioral: Negative for agitation, behavioral problems and confusion. The patient is not nervous/anxious.      Objective:     Vital Signs (Most Recent):  Temp: 97.2 °F (36.2 °C) (06/14/18 0738)  Pulse: 63 (06/14/18 1100)  Resp: 18 (06/14/18 1100)  BP: (!) 176/84 (06/14/18 1100)  SpO2: 96 % (06/14/18 1100) Vital Signs (24h Range):  Temp:  [97.2 °F (36.2 °C)-98.3 °F (36.8 °C)] 97.2 °F (36.2 °C)  Pulse:  [61-81] 63  Resp:  [16-27] 18  SpO2:  [96 %-100 %] 96 %  BP: ()/(53-85) 176/84     Weight: 92.5 kg (204 lb)  Body mass index is 30.13 kg/m².    Intake/Output Summary (Last 24 hours) at 06/14/18 1241  Last data filed at 06/14/18 0500   Gross per 24 hour   Intake              360 ml   Output                0 ml   Net              360 ml      Physical Exam   Constitutional: He is oriented to person, place,  and time. He appears well-developed and well-nourished. No distress.   HENT:   Head: Normocephalic and atraumatic.   Nose: Nose normal.   Mouth/Throat: Oropharynx is clear and moist.   Eyes: Conjunctivae and EOM are normal. No scleral icterus.   Neck: Normal range of motion. Neck supple.   Cardiovascular: Normal rate, regular rhythm, normal heart sounds and intact distal pulses.  Exam reveals no gallop and no friction rub.    No murmur heard.  Pulmonary/Chest: Effort normal and breath sounds normal. No stridor. No respiratory distress. He has no wheezes. He has no rales. He exhibits no tenderness.   Abdominal: Soft. Bowel sounds are normal. He exhibits no distension. There is no tenderness. There is no rebound and no guarding.   Musculoskeletal: Normal range of motion. He exhibits no edema, tenderness or deformity.   Neurological: He is alert and oriented to person, place, and time. He has normal reflexes. No cranial nerve deficit. He exhibits normal muscle tone. Coordination normal.   Skin: Skin is warm and dry. No rash noted. He is not diaphoretic. No erythema. No pallor.   Psychiatric: He has a normal mood and affect. His behavior is normal. Thought content normal.   Nursing note and vitals reviewed.      Significant Labs:   BMP:   Recent Labs  Lab 06/13/18  1251   *      K 3.6      CO2 22*   BUN 15   CREATININE 1.8*   CALCIUM 9.6     CBC:   Recent Labs  Lab 06/13/18  1251   WBC 5.58   HGB 11.4*   HCT 33.1*        CMP:   Recent Labs  Lab 06/13/18  1251      K 3.6      CO2 22*   *   BUN 15   CREATININE 1.8*   CALCIUM 9.6   PROT 8.0   ALBUMIN 4.0   BILITOT 0.8   ALKPHOS 42*   AST 18   ALT 16   ANIONGAP 8   EGFRNONAA 36*     All pertinent labs within the past 24 hours have been reviewed.    Significant Imaging:   Imaging Results          X-Ray Chest AP Portable (Final result)  Result time 06/13/18 13:08:31    Final result by Live Ibrahim MD (06/13/18 13:08:31)                  Impression:      No acute process seen.      Electronically signed by: Live Ibrahim MD  Date:    06/13/2018  Time:    13:08             Narrative:    EXAMINATION:  XR CHEST AP PORTABLE    CLINICAL HISTORY:  chest pain;    FINDINGS:  Single view of the chest.  Comparison 02/10/2017.  Left-sided pacing device demonstrates similar configuration.    Cardiac silhouette is normal.  The lungs demonstrate no evidence of active disease.  No evidence of pleural effusion or pneumothorax.  Bones appear intact.

## 2018-06-14 NOTE — DISCHARGE SUMMARY
[facility] Hospital Medicine  Discharge Summary      Patient Name: Curtis Landry  MRN: [id number]  Admission Date: 6/13/2018  Hospital Length of Stay: 0 days  Discharge Date and Time:  06/14/2018 6:17 PM  Attending Physician: Marybeth Grimaldo MD   Discharging Provider: An Toledo NP  Primary Care Provider: Liz Hall MD      HPI:   78 y/o male with PMHx of CHF & HTN who presented to the ed with c/o CP that onset suddenly earlier today. Pain is described as sharp pain to right side of chest 8/10 on pain scale. It does not radiate. Associated symptoms include diaphoresis, nausea, SOB, dizziness, and weakness. Symptoms are constant and moderate. No exacerbating factors. Pt took 2 nitroglycerine with mild relief. Pt denies fever, chills, palpitations, cough, vomiting, diarrhea, fall, LOC, confusion, and all other symptoms at this time. ED workup shows hypotension 87/53, Cr. 1.8, eGFR 41, H/H 11.4/33.1. Troponin is WNL. CXR: no acute process seen. EKG shows NSR, L axis deviation. No STEMI. Patient will be kept for OBS for Chest Pain under the care of Hospital Medicine.  Patient is a full code and his surrogate decision maker is his cousin, Live Birch [phone]      * No surgery found *      Hospital Course:   The pt was placed in observation for chest pain accompanied by dizziness, SOB,  and diaphoresis. Pt took NTG x 2 PTA. he was hypotensive on arrival.  Serial troponin normal. Nuclear stress test was negative.  No further chest pain, dizziness or SOB.       Final Active Diagnoses:    Diagnosis Date Noted POA    PRINCIPAL PROBLEM:  Chest pain [R07.9] 06/13/2018 Yes    ARF (acute renal failure) [N17.9] 06/13/2018 Yes    Essential hypertension [I10] 11/20/2015 Yes    CHF (NYHA class III, ACC/AHA stage C) [I50.9] 10/11/2013 Yes     Chronic    Hyperlipidemia [E78.5] 08/05/2013 Yes      Problems Resolved During this Admission:    Diagnosis Date Noted Date Resolved POA       Discharged  Condition: stable    Disposition: Home or Self Care    Follow Up:  Follow-up Information     Liz Hall MD In 3 days.    Specialty:  Family Medicine  Contact information:  [address] [place] [address] [address] [place] [address] [phone]                 Patient Instructions:     Diet Cardiac     Activity as tolerated         Significant Diagnostic Studies:   Imaging Results          X-Ray Chest AP Portable (Final result)  Result time 06/13/18 13:08:31    Final result by Live Ibrahim MD (06/13/18 13:08:31)                 Impression:      No acute process seen.      Electronically signed by: Live Ibrahim MD  Date:    06/13/2018  Time:    13:08             Narrative:    EXAMINATION:  XR CHEST AP PORTABLE    CLINICAL HISTORY:  chest pain;    FINDINGS:  Single view of the chest.  Comparison 02/10/2017.  Left-sided pacing device demonstrates similar configuration.    Cardiac silhouette is normal.  The lungs demonstrate no evidence of active disease.  No evidence of pleural effusion or pneumothorax.  Bones appear intact.                                Pending Diagnostic Studies:     Procedure Component Value Units Date/Time    NM Myocardial Perfusion Spect Multi Pharmacologic [[id number]] Resulted:  06/14/18 1151    Order Status:  Sent Lab Status:  In process Updated:  06/14/18 1607         Medications:  Reconciled Home Medications:      Medication List      CONTINUE taking these medications    acetaminophen 650 MG Tbsr  Commonly known as:  TYLENOL  Take 1 tablet (650 mg total) by mouth every 8 (eight) hours.     aspirin 81 MG EC tablet  Commonly known as:  ECOTRIN  Take by mouth. 1 Tablet, Delayed Release (E.C.) Oral Every day     CALCIUM 500 WITH D 500 mg(1,250mg) -400 unit Tab  Generic drug:  calcium carbonate-vitamin D3  Take by mouth.     carvedilol 25 MG tablet  Commonly known as:  COREG  TAKE 1 TABLET (25 MG TOTAL) BY MOUTH 2 (TWO) TIMES DAILY.     COMPLETE MULTIVITAMIN ORAL  Take 1 tablet by mouth once  daily.     dorzolamide-timolol 2-0.5% 22.3-6.8 mg/mL ophthalmic solution  Commonly known as:  COSOPT  Place 1 drop into both eyes 2 (two) times daily.     lisinopril 10 MG tablet  TAKE ONE TABLET BY MOUTH ONE TIME DAILY     nitroGLYCERIN 0.4 MG SL tablet  Commonly known as:  NITROSTAT  PLACE 1 TABLET BY MOUTH UNDER TONGUE EVERY 5 MINUTES AS NEEDED FOR CHEST PAIN     simvastatin 40 MG tablet  Commonly known as:  ZOCOR  Take 1 tablet (40 mg total) by mouth every evening.     TRAVATAN Z 0.004 % ophthalmic solution  Generic drug:  travoprost  Inject into the eye. 1 Drops Ophthalmic At bedtime     VITAMIN B-12 1000 MCG tablet  Generic drug:  cyanocobalamin  Take by mouth. 1 Tablet Oral Every day        STOP taking these medications    COD LIVER OIL ORAL     cranberry 500 mg Cap            Indwelling Lines/Drains at time of discharge:   Lines/Drains/Airways          No matching active lines, drains, or airways          Time spent on the discharge of patient: 42 minutes  Patient was seen and examined on the date of discharge and determined to be suitable for discharge.         An Toledo NP  Department of Hospital Medicine  [facility]

## 2018-06-14 NOTE — ASSESSMENT & PLAN NOTE
-Echo showed normal LVEF and normal diastolic function.   Had hx of lower EF that recovered   Compensated

## 2018-06-14 NOTE — PROGRESS NOTES
[facility] Hospital Medicine  Progress Note    Patient Name: Curtis Landry  MRN: [id number]  Patient Class: OP- Observation   Admission Date: 6/13/2018  Length of Stay: 0 days  Attending Physician: Marybeth Grimaldo MD  Primary Care Provider: Liz Hall MD        Subjective:     Principal Problem:Chest pain    HPI:  78 y/o male with PMHx of CHF & HTN who presented to the ed with c/o CP that onset suddenly earlier today. Pain is described as sharp pain to right side of chest 8/10 on pain scale. It does not radiate. Associated symptoms include diaphoresis, nausea, SOB, dizziness, and weakness. Symptoms are constant and moderate. No exacerbating factors. Pt took 2 nitroglycerine with mild relief. Pt denies fever, chills, palpitations, cough, vomiting, diarrhea, fall, LOC, confusion, and all other symptoms at this time. ED workup shows hypotension 87/53, Cr. 1.8, eGFR 41, H/H 11.4/33.1. Troponin is WNL. CXR: no acute process seen. EKG shows NSR, L axis deviation. No STEMI. Patient will be kept for OBS for Chest Pain under the care of Hospital Medicine.  Patient is a full code and his surrogate decision maker is his cousin, Live Birch [phone]      Hospital Course:  The pt was placed in observation for chest pain accompanied by dizziness, SOB,  and diaphoresis. Pt took NTG x 2 PTA. he was hypotensive on arrival.  Serial troponin normal. No further chest pain, dizziness or SOB. Due to risk factors- will order stress test. Nuclear stress test pending.         Review of Systems   Constitutional: Negative for appetite change, chills, diaphoresis, fatigue and fever.   HENT: Negative for congestion, nosebleeds, sore throat and trouble swallowing.    Eyes: Negative for pain, discharge and visual disturbance.   Respiratory: Negative for apnea, cough, chest tightness, shortness of breath, wheezing and stridor.    Cardiovascular: Negative for chest pain, palpitations and leg swelling.    Gastrointestinal: Negative for abdominal distention, abdominal pain, blood in stool, constipation, diarrhea, nausea and vomiting.   Endocrine: Negative for cold intolerance and heat intolerance.   Genitourinary: Negative for difficulty urinating, dysuria, flank pain, frequency and urgency.   Musculoskeletal: Negative for arthralgias, back pain, joint swelling, myalgias, neck pain and neck stiffness.   Skin: Negative for rash and wound.   Allergic/Immunologic: Negative for food allergies and immunocompromised state.   Neurological: Negative for dizziness, seizures, syncope, facial asymmetry, weakness, light-headedness and headaches.   Hematological: Negative for adenopathy.   Psychiatric/Behavioral: Negative for agitation, behavioral problems and confusion. The patient is not nervous/anxious.      Objective:     Vital Signs (Most Recent):  Temp: 97.2 °F (36.2 °C) (06/14/18 0738)  Pulse: 63 (06/14/18 1100)  Resp: 18 (06/14/18 1100)  BP: (!) 176/84 (06/14/18 1100)  SpO2: 96 % (06/14/18 1100) Vital Signs (24h Range):  Temp:  [97.2 °F (36.2 °C)-98.3 °F (36.8 °C)] 97.2 °F (36.2 °C)  Pulse:  [61-81] 63  Resp:  [16-27] 18  SpO2:  [96 %-100 %] 96 %  BP: ()/(53-85) 176/84     Weight: 92.5 kg (204 lb)  Body mass index is 30.13 kg/m².    Intake/Output Summary (Last 24 hours) at 06/14/18 1241  Last data filed at 06/14/18 0500   Gross per 24 hour   Intake              360 ml   Output                0 ml   Net              360 ml      Physical Exam   Constitutional: He is oriented to person, place, and time. He appears well-developed and well-nourished. No distress.   HENT:   Head: Normocephalic and atraumatic.   Nose: Nose normal.   Mouth/Throat: Oropharynx is clear and moist.   Eyes: Conjunctivae and EOM are normal. No scleral icterus.   Neck: Normal range of motion. Neck supple.   Cardiovascular: Normal rate, regular rhythm, normal heart sounds and intact distal pulses.  Exam reveals no gallop and no friction rub.    No  murmur heard.  Pulmonary/Chest: Effort normal and breath sounds normal. No stridor. No respiratory distress. He has no wheezes. He has no rales. He exhibits no tenderness.   Abdominal: Soft. Bowel sounds are normal. He exhibits no distension. There is no tenderness. There is no rebound and no guarding.   Musculoskeletal: Normal range of motion. He exhibits no edema, tenderness or deformity.   Neurological: He is alert and oriented to person, place, and time. He has normal reflexes. No cranial nerve deficit. He exhibits normal muscle tone. Coordination normal.   Skin: Skin is warm and dry. No rash noted. He is not diaphoretic. No erythema. No pallor.   Psychiatric: He has a normal mood and affect. His behavior is normal. Thought content normal.   Nursing note and vitals reviewed.      Significant Labs:   BMP:   Recent Labs  Lab 06/13/18  1251   *      K 3.6      CO2 22*   BUN 15   CREATININE 1.8*   CALCIUM 9.6     CBC:   Recent Labs  Lab 06/13/18  1251   WBC 5.58   HGB 11.4*   HCT 33.1*        CMP:   Recent Labs  Lab 06/13/18  1251      K 3.6      CO2 22*   *   BUN 15   CREATININE 1.8*   CALCIUM 9.6   PROT 8.0   ALBUMIN 4.0   BILITOT 0.8   ALKPHOS 42*   AST 18   ALT 16   ANIONGAP 8   EGFRNONAA 36*     All pertinent labs within the past 24 hours have been reviewed.    Significant Imaging:   Imaging Results          X-Ray Chest AP Portable (Final result)  Result time 06/13/18 13:08:31    Final result by Live Ibrahim MD (06/13/18 13:08:31)                 Impression:      No acute process seen.      Electronically signed by: Live Ibrahim MD  Date:    06/13/2018  Time:    13:08             Narrative:    EXAMINATION:  XR CHEST AP PORTABLE    CLINICAL HISTORY:  chest pain;    FINDINGS:  Single view of the chest.  Comparison 02/10/2017.  Left-sided pacing device demonstrates similar configuration.    Cardiac silhouette is normal.  The lungs demonstrate no evidence of active  disease.  No evidence of pleural effusion or pneumothorax.  Bones appear intact.                              Assessment/Plan:      * Chest pain    Stress test today         ARF (acute renal failure)    --IVF's  --monitor BMP          Essential hypertension    Restart Coreg  Hypotension likely secondary to NTG           CHF (NYHA class III, ACC/AHA stage C)    -Echo showed normal LVEF and normal diastolic function.   Had hx of lower EF that recovered   Compensated         Hyperlipidemia    --continue statin  --cardiac diet  --lipid panel pending          VTE Risk Mitigation         Ordered     enoxaparin injection 40 mg  Daily      06/13/18 1531     IP VTE HIGH RISK PATIENT  Once      06/13/18 1454     Place sequential compression device  Until discontinued      06/13/18 1454              An Toledo NP  Department of Hospital Medicine   [facility]

## 2018-06-14 NOTE — HOSPITAL COURSE
The pt was placed in observation for chest pain accompanied by dizziness, SOB,  and diaphoresis. Pt took NTG x 2 PTA. he was hypotensive on arrival.  Serial troponin normal. Nuclear stress test was negative.  No further chest pain, dizziness or SOB.

## 2018-06-14 NOTE — PLAN OF CARE
Problem: Patient Care Overview  Goal: Plan of Care Review  Outcome: Ongoing (interventions implemented as appropriate)  Pt alert and oriented. POC reviewed. NO C/o chest pain. Pt remained free of falls during shift, Bed alarm set , call light in reach, room free of clutter, side rails  up x2, pt on telemetry monitor SR 60's, will continue to monitor.

## 2018-06-15 NOTE — NURSING
Pt discharged per MD order. Discharge instructions, appts and medications reviewed with pt and family. All questions answered. IV and tele monitor removed. Pt taken down to family vehicle by wheelchair without incident or complaint.

## 2018-06-27 ENCOUNTER — OFFICE VISIT (OUTPATIENT)
Dept: INTERNAL MEDICINE | Facility: CLINIC | Age: 78
End: 2018-06-27
Payer: MEDICARE

## 2018-06-27 VITALS
OXYGEN SATURATION: 99 % | HEIGHT: 69 IN | WEIGHT: 199.06 LBS | TEMPERATURE: 97 F | HEART RATE: 61 BPM | DIASTOLIC BLOOD PRESSURE: 78 MMHG | SYSTOLIC BLOOD PRESSURE: 142 MMHG | BODY MASS INDEX: 29.48 KG/M2

## 2018-06-27 DIAGNOSIS — R07.9 CHEST PAIN, UNSPECIFIED TYPE: ICD-10-CM

## 2018-06-27 PROCEDURE — 99213 OFFICE O/P EST LOW 20 MIN: CPT | Mod: PBBFAC | Performed by: FAMILY MEDICINE

## 2018-06-27 PROCEDURE — 99213 OFFICE O/P EST LOW 20 MIN: CPT | Mod: S$PBB,,, | Performed by: FAMILY MEDICINE

## 2018-06-27 PROCEDURE — 99999 PR PBB SHADOW E&M-EST. PATIENT-LVL III: CPT | Mod: PBBFAC,,, | Performed by: FAMILY MEDICINE

## 2018-06-27 NOTE — PROGRESS NOTES
Subjective:       Patient ID: Curtis Landry is a 77 y.o. male.    Chief Complaint: Hospital Follow Up    Patient presents to clinic today with caregiver for hospital follow-up.  Patient was recently admitted for chest pain, hypertension and elevated creatinine.  He received IV hydration and his creatinine was normal at discharge. His cardiac workup was negative, including troponins negative x3 and negative nuclear stress test.  Patient reports he is feeling well and was without concerns today.      Review of Systems   Constitutional: Negative for chills, fatigue, fever and unexpected weight change.   Eyes: Negative for visual disturbance.   Respiratory: Negative for shortness of breath.    Cardiovascular: Negative for chest pain.   Musculoskeletal: Negative for myalgias.   Neurological: Negative for headaches.       Objective:      Physical Exam   Constitutional: He is oriented to person, place, and time. He appears well-developed and well-nourished. No distress.   HENT:   Head: Normocephalic and atraumatic.   Eyes: Conjunctivae and EOM are normal. Pupils are equal, round, and reactive to light. No scleral icterus.   Cardiovascular: Normal rate and regular rhythm.  Exam reveals no gallop and no friction rub.    No murmur heard.  Pulmonary/Chest: Effort normal and breath sounds normal.   Neurological: He is alert and oriented to person, place, and time. No cranial nerve deficit. Gait normal.   Ambulates with cane   Psychiatric: He has a normal mood and affect.   Vitals reviewed.      Assessment:       1. Chest pain, unspecified type        Plan:     Problem List Items Addressed This Visit     Chest pain    Current Assessment & Plan     Work up in hospital was negative, symptoms have resolved             Discharge summary reviewed. It seems patient may have been dehydrated. Symptoms resolved, he is feeling well. Keep routine follow up, return sooner prn.

## 2018-06-28 DIAGNOSIS — I50.9 CHF (NYHA CLASS III, ACC/AHA STAGE C): Chronic | ICD-10-CM

## 2018-06-28 DIAGNOSIS — I10 ESSENTIAL HYPERTENSION: ICD-10-CM

## 2018-06-28 DIAGNOSIS — I25.5 ISCHEMIC CARDIOMYOPATHY: ICD-10-CM

## 2018-06-28 RX ORDER — LISINOPRIL 10 MG/1
TABLET ORAL
Qty: 30 TABLET | Refills: 0 | Status: SHIPPED | OUTPATIENT
Start: 2018-06-28 | End: 2018-07-05 | Stop reason: SDUPTHER

## 2018-07-05 DIAGNOSIS — I25.5 ISCHEMIC CARDIOMYOPATHY: ICD-10-CM

## 2018-07-05 DIAGNOSIS — I10 ESSENTIAL HYPERTENSION: ICD-10-CM

## 2018-07-05 DIAGNOSIS — I50.9 CHF (NYHA CLASS III, ACC/AHA STAGE C): Chronic | ICD-10-CM

## 2018-07-05 RX ORDER — LISINOPRIL 10 MG/1
TABLET ORAL
Qty: 30 TABLET | Refills: 0 | Status: SHIPPED | OUTPATIENT
Start: 2018-07-05 | End: 2018-09-05 | Stop reason: SDUPTHER

## 2018-08-14 RX ORDER — SIMVASTATIN 40 MG/1
40 TABLET, FILM COATED ORAL NIGHTLY
Qty: 90 TABLET | Refills: 2 | Status: SHIPPED | OUTPATIENT
Start: 2018-08-14 | End: 2018-08-15 | Stop reason: SDUPTHER

## 2018-08-14 RX ORDER — SIMVASTATIN 40 MG/1
40 TABLET, FILM COATED ORAL NIGHTLY
Qty: 90 TABLET | Refills: 2 | Status: SHIPPED | OUTPATIENT
Start: 2018-08-14 | End: 2018-08-14 | Stop reason: SDUPTHER

## 2018-08-15 RX ORDER — SIMVASTATIN 40 MG/1
40 TABLET, FILM COATED ORAL NIGHTLY
Qty: 90 TABLET | Refills: 2 | Status: SHIPPED | OUTPATIENT
Start: 2018-08-15 | End: 2019-10-25 | Stop reason: ALTCHOICE

## 2018-08-21 ENCOUNTER — CLINICAL SUPPORT (OUTPATIENT)
Dept: CARDIOLOGY | Facility: CLINIC | Age: 78
End: 2018-08-21
Attending: NUCLEAR MEDICINE
Payer: MEDICARE

## 2018-08-21 ENCOUNTER — OFFICE VISIT (OUTPATIENT)
Dept: CARDIOLOGY | Facility: CLINIC | Age: 78
End: 2018-08-21
Payer: MEDICARE

## 2018-08-21 VITALS
HEART RATE: 64 BPM | WEIGHT: 199 LBS | SYSTOLIC BLOOD PRESSURE: 130 MMHG | HEIGHT: 69 IN | DIASTOLIC BLOOD PRESSURE: 80 MMHG | BODY MASS INDEX: 29.47 KG/M2

## 2018-08-21 DIAGNOSIS — I50.9 CHF (NYHA CLASS III, ACC/AHA STAGE C): Primary | Chronic | ICD-10-CM

## 2018-08-21 DIAGNOSIS — Z95.810 ICD (IMPLANTABLE CARDIOVERTER-DEFIBRILLATOR) IN PLACE: Primary | ICD-10-CM

## 2018-08-21 DIAGNOSIS — I25.5 ISCHEMIC CARDIOMYOPATHY: ICD-10-CM

## 2018-08-21 DIAGNOSIS — I50.9 CHF (NYHA CLASS III, ACC/AHA STAGE C): ICD-10-CM

## 2018-08-21 DIAGNOSIS — Z95.810 ICD (IMPLANTABLE CARDIOVERTER-DEFIBRILLATOR) IN PLACE: ICD-10-CM

## 2018-08-21 PROCEDURE — 99214 OFFICE O/P EST MOD 30 MIN: CPT | Mod: S$PBB,,, | Performed by: NUCLEAR MEDICINE

## 2018-08-21 PROCEDURE — 99213 OFFICE O/P EST LOW 20 MIN: CPT | Mod: PBBFAC | Performed by: NUCLEAR MEDICINE

## 2018-08-21 PROCEDURE — 99999 PR PBB SHADOW E&M-EST. PATIENT-LVL III: CPT | Mod: PBBFAC,,, | Performed by: NUCLEAR MEDICINE

## 2018-08-21 PROCEDURE — 93283 PRGRMG EVAL IMPLANTABLE DFB: CPT | Mod: 26,,, | Performed by: NUCLEAR MEDICINE

## 2018-08-21 NOTE — PROGRESS NOTES
Subjective:   Patient ID:  Curtis Landry is a 77 y.o. male who presents for follow-up of Congestive Heart Failure (re-establish care)      HPI1- CHRONIC HF SYSTOLIC, RECOVERED- ISCHEMIC CMP-  POST ICD-   2- ESSENTIAL HTN  DOING WELL. NO RECENT ICD DISCHARGE  PACE AND ICD ANALYSIS TODAY- WELL FUNCTIONING DEVICE  NO RECENT HOSPITALIZATIONS OR ED VISITS FOR ADHF. OR ARRHYTHMIAS OR ACS  NO UNUSUAL CORBIN. NO ORTHOPNEA OR PND  NO EDEMA. NO CALVE TENDERNESS  NO ABDOMINAL DISCOMFORT  NO FOCAL CNS SYMPTOMS OR SIGNS TO SUGGEST TIA OR STROKE  CARD MED GOOD COMPLIANCE    Review of Systems   Constitution: Negative for chills, fever, weakness, night sweats, weight gain and weight loss.   HENT: Negative for nosebleeds.    Eyes: Negative for blurred vision, double vision and visual disturbance.   Cardiovascular: Negative for chest pain, dyspnea on exertion, irregular heartbeat, leg swelling, orthopnea, palpitations, paroxysmal nocturnal dyspnea and syncope.   Respiratory: Negative for cough, hemoptysis and wheezing.    Endocrine: Negative for polydipsia and polyuria.   Hematologic/Lymphatic: Does not bruise/bleed easily.   Skin: Negative for rash.   Musculoskeletal: Negative for joint pain, joint swelling, muscle weakness and myalgias.   Gastrointestinal: Negative for abdominal pain, hematemesis, jaundice and melena.   Genitourinary: Negative for dysuria, hematuria and nocturia.   Neurological: Negative for dizziness, focal weakness, headaches and sensory change.   Psychiatric/Behavioral: Negative for depression. The patient does not have insomnia and is not nervous/anxious.      Family History   Problem Relation Age of Onset    Cancer Mother      Past Medical History:   Diagnosis Date    Arthritis     CHF (congestive heart failure)     Glaucoma (increased eye pressure)     Followed by outside opthalmology    HTN (hypertension)     Hyperlipidemia     Macular degeneration     Prostate cancer      Current Outpatient  Medications on File Prior to Visit   Medication Sig Dispense Refill    aspirin (ECOTRIN) 81 MG EC tablet Take by mouth. 1 Tablet, Delayed Release (E.C.) Oral Every day      carvedilol (COREG) 25 MG tablet TAKE 1 TABLET (25 MG TOTAL) BY MOUTH 2 (TWO) TIMES DAILY. 180 tablet 3    cyanocobalamin (VITAMIN B-12) 1000 MCG tablet Take by mouth. 1 Tablet Oral Every day      dorzolamide-timolol 2-0.5% (COSOPT) 22.3-6.8 mg/mL ophthalmic solution Place 1 drop into both eyes 2 (two) times daily.  4    lisinopril 10 MG tablet TAKE 1 TABLET BY MOUTH EVERY DAY 30 tablet 0    MULTIVITS,TH W-FE,OTHER MIN (COMPLETE MULTIVITAMIN ORAL) Take 1 tablet by mouth once daily.      simvastatin (ZOCOR) 40 MG tablet TAKE 1 TABLET (40 MG TOTAL) BY MOUTH EVERY EVENING. 90 tablet 2    travoprost (TRAVATAN Z) 0.004 % Drop Inject into the eye. 1 Drops Ophthalmic At bedtime      acetaminophen (TYLENOL) 650 MG TbSR Take 1 tablet (650 mg total) by mouth every 8 (eight) hours.  0    nitroGLYCERIN (NITROSTAT) 0.4 MG SL tablet PLACE 1 TABLET BY MOUTH UNDER TONGUE EVERY 5 MINUTES AS NEEDED FOR CHEST PAIN 100 tablet 0    [DISCONTINUED] calcium carbonate-vitamin D3 (CALCIUM 500 WITH D) 500 mg(1,250mg) -400 unit Tab Take by mouth.       No current facility-administered medications on file prior to visit.      Review of patient's allergies indicates:   Allergen Reactions    No known drug allergies        Objective:     Physical Exam   Constitutional: He is oriented to person, place, and time. He appears well-developed. No distress.   HENT:   Head: Normocephalic.   Eyes: Conjunctivae are normal. Pupils are equal, round, and reactive to light.   Neck: Neck supple. No JVD present. No thyromegaly present.   Cardiovascular: Normal rate, regular rhythm, normal heart sounds and intact distal pulses. Exam reveals no gallop and no friction rub.   No murmur heard.  Pulses:       Carotid pulses are 2+ on the right side, and 2+ on the left side.       Radial  pulses are 2+ on the right side, and 2+ on the left side.        Femoral pulses are 2+ on the right side, and 2+ on the left side.       Popliteal pulses are 2+ on the right side, and 2+ on the left side.        Dorsalis pedis pulses are 2+ on the right side, and 2+ on the left side.        Posterior tibial pulses are 2+ on the right side, and 2+ on the left side.   Pulmonary/Chest: Breath sounds normal. He has no wheezes. He has no rales. He exhibits no tenderness.   Abdominal: Soft. Bowel sounds are normal. He exhibits no mass. There is no hepatosplenomegaly. There is no tenderness.   Musculoskeletal: He exhibits no edema or tenderness.        Cervical back: Normal.        Thoracic back: Normal.        Lumbar back: Normal.   Lymphadenopathy:     He has no cervical adenopathy.     He has no axillary adenopathy.        Right: No supraclavicular adenopathy present.        Left: No supraclavicular adenopathy present.   Neurological: He is alert and oriented to person, place, and time. He has normal strength. No sensory deficit. Gait normal.   Skin: Skin is warm. No cyanosis. No pallor. Nails show no clubbing.   Psychiatric: He has a normal mood and affect. His speech is normal and behavior is normal. Cognition and memory are normal.       Assessment:     1. CHF (NYHA class III, ACC/AHA stage C)    2. Ischemic cardiomyopathy      CLINICALLY WELL COMPENSATED HF  NO EVIDENCE OF ACTIVE MYOCARDIAL ISCHEMIA  NO ARRHYTHMIAS  WELL FUNCTIONING PACE/ ICD  CNS STATUS STABLE  Plan:     CHF (NYHA class III, ACC/AHA stage C)    Ischemic cardiomyopathy    1- CONTINUE PRESENT CARD MANAGEMENT    2- RETURN IN 4 MONTHS.

## 2018-09-05 DIAGNOSIS — I10 ESSENTIAL HYPERTENSION: ICD-10-CM

## 2018-09-05 DIAGNOSIS — I25.5 ISCHEMIC CARDIOMYOPATHY: ICD-10-CM

## 2018-09-05 DIAGNOSIS — I50.9 CHF (NYHA CLASS III, ACC/AHA STAGE C): Chronic | ICD-10-CM

## 2018-09-05 RX ORDER — LISINOPRIL 10 MG/1
10 TABLET ORAL DAILY
Qty: 30 TABLET | Refills: 6 | Status: SHIPPED | OUTPATIENT
Start: 2018-09-05 | End: 2019-02-28 | Stop reason: SDUPTHER

## 2018-10-29 ENCOUNTER — PES CALL (OUTPATIENT)
Dept: ADMINISTRATIVE | Facility: CLINIC | Age: 78
End: 2018-10-29

## 2018-10-29 RX ORDER — CARVEDILOL 25 MG/1
TABLET ORAL
Qty: 180 TABLET | Refills: 3 | Status: SHIPPED | OUTPATIENT
Start: 2018-10-29 | End: 2019-10-25 | Stop reason: ALTCHOICE

## 2018-11-23 ENCOUNTER — TELEPHONE (OUTPATIENT)
Dept: CARDIOLOGY | Facility: CLINIC | Age: 78
End: 2018-11-23

## 2018-11-23 NOTE — TELEPHONE ENCOUNTER
----- Message from Meghann Oseguera sent at 11/23/2018  1:22 PM CST -----  Contact: Bela Weaver  Request a call to reschedule the pt 11/29 appt, can be reached at [phone]///[phone]

## 2018-11-26 ENCOUNTER — LAB VISIT (OUTPATIENT)
Dept: LAB | Facility: HOSPITAL | Age: 78
End: 2018-11-26
Attending: FAMILY MEDICINE
Payer: MEDICARE

## 2018-11-26 ENCOUNTER — OFFICE VISIT (OUTPATIENT)
Dept: INTERNAL MEDICINE | Facility: CLINIC | Age: 78
End: 2018-11-26
Payer: MEDICARE

## 2018-11-26 VITALS
SYSTOLIC BLOOD PRESSURE: 122 MMHG | DIASTOLIC BLOOD PRESSURE: 60 MMHG | TEMPERATURE: 97 F | OXYGEN SATURATION: 96 % | WEIGHT: 201.06 LBS | HEIGHT: 69 IN | HEART RATE: 68 BPM | BODY MASS INDEX: 29.78 KG/M2

## 2018-11-26 DIAGNOSIS — N18.30 ANEMIA OF CHRONIC RENAL FAILURE, STAGE 3 (MODERATE): ICD-10-CM

## 2018-11-26 DIAGNOSIS — H40.9 GLAUCOMA, UNSPECIFIED GLAUCOMA TYPE, UNSPECIFIED LATERALITY: ICD-10-CM

## 2018-11-26 DIAGNOSIS — Z95.810 AUTOMATIC IMPLANTABLE CARDIOVERTER-DEFIBRILLATOR IN SITU: ICD-10-CM

## 2018-11-26 DIAGNOSIS — E78.5 HYPERLIPIDEMIA, UNSPECIFIED HYPERLIPIDEMIA TYPE: ICD-10-CM

## 2018-11-26 DIAGNOSIS — D47.2 MGUS (MONOCLONAL GAMMOPATHY OF UNKNOWN SIGNIFICANCE): ICD-10-CM

## 2018-11-26 DIAGNOSIS — I10 ESSENTIAL HYPERTENSION: ICD-10-CM

## 2018-11-26 DIAGNOSIS — C61 PROSTATE CANCER: ICD-10-CM

## 2018-11-26 DIAGNOSIS — N17.9 ACUTE RENAL FAILURE, UNSPECIFIED ACUTE RENAL FAILURE TYPE: ICD-10-CM

## 2018-11-26 DIAGNOSIS — I50.9 CHF (NYHA CLASS III, ACC/AHA STAGE C): Chronic | ICD-10-CM

## 2018-11-26 DIAGNOSIS — D63.1 ANEMIA OF CHRONIC RENAL FAILURE, STAGE 3 (MODERATE): ICD-10-CM

## 2018-11-26 DIAGNOSIS — Z00.00 ENCOUNTER FOR PREVENTIVE HEALTH EXAMINATION: Primary | ICD-10-CM

## 2018-11-26 LAB
ALBUMIN SERPL BCP-MCNC: 4 G/DL
ALP SERPL-CCNC: 42 U/L
ALT SERPL W/O P-5'-P-CCNC: 25 U/L
ANION GAP SERPL CALC-SCNC: 7 MMOL/L
AST SERPL-CCNC: 23 U/L
BILIRUB SERPL-MCNC: 0.7 MG/DL
BUN SERPL-MCNC: 13 MG/DL
CALCIUM SERPL-MCNC: 10.4 MG/DL
CHLORIDE SERPL-SCNC: 108 MMOL/L
CHOLEST SERPL-MCNC: 147 MG/DL
CHOLEST/HDLC SERPL: 4.1 {RATIO}
CO2 SERPL-SCNC: 27 MMOL/L
CREAT SERPL-MCNC: 1.2 MG/DL
EST. GFR  (AFRICAN AMERICAN): >60 ML/MIN/1.73 M^2
EST. GFR  (NON AFRICAN AMERICAN): 57.6 ML/MIN/1.73 M^2
GLUCOSE SERPL-MCNC: 100 MG/DL
HDLC SERPL-MCNC: 36 MG/DL
HDLC SERPL: 24.5 %
LDLC SERPL CALC-MCNC: 83.8 MG/DL
NONHDLC SERPL-MCNC: 111 MG/DL
POTASSIUM SERPL-SCNC: 4.2 MMOL/L
PROT SERPL-MCNC: 8.4 G/DL
SODIUM SERPL-SCNC: 142 MMOL/L
TRIGL SERPL-MCNC: 136 MG/DL

## 2018-11-26 PROCEDURE — G0439 PPPS, SUBSEQ VISIT: HCPCS | Mod: HCNC,S$GLB,, | Performed by: NURSE PRACTITIONER

## 2018-11-26 PROCEDURE — 3074F SYST BP LT 130 MM HG: CPT | Mod: CPTII,HCNC,S$GLB, | Performed by: NURSE PRACTITIONER

## 2018-11-26 PROCEDURE — G0008 ADMIN INFLUENZA VIRUS VAC: HCPCS | Mod: HCNC,S$GLB,, | Performed by: NURSE PRACTITIONER

## 2018-11-26 PROCEDURE — 36415 COLL VENOUS BLD VENIPUNCTURE: CPT | Mod: HCNC

## 2018-11-26 PROCEDURE — 80053 COMPREHEN METABOLIC PANEL: CPT | Mod: HCNC

## 2018-11-26 PROCEDURE — 90662 IIV NO PRSV INCREASED AG IM: CPT | Mod: HCNC,S$GLB,, | Performed by: NURSE PRACTITIONER

## 2018-11-26 PROCEDURE — 3078F DIAST BP <80 MM HG: CPT | Mod: CPTII,HCNC,S$GLB, | Performed by: NURSE PRACTITIONER

## 2018-11-26 PROCEDURE — 80061 LIPID PANEL: CPT | Mod: HCNC

## 2018-11-26 PROCEDURE — 99999 PR PBB SHADOW E&M-EST. PATIENT-LVL IV: CPT | Mod: PBBFAC,HCNC,, | Performed by: NURSE PRACTITIONER

## 2018-11-26 NOTE — ASSESSMENT & PLAN NOTE
Lab Results   Component Value Date    CHOL 139 06/13/2018    CHOL 147 02/20/2018    CHOL 150 07/12/2017     Lab Results   Component Value Date    HDL 27 (L) 06/13/2018    HDL 36 (L) 02/20/2018    HDL 35 (L) 07/12/2017     Lab Results   Component Value Date    LDLCALC 55.8 (L) 06/13/2018    LDLCALC 79.0 02/20/2018    LDLCALC 72.8 07/12/2017     Lab Results   Component Value Date    TRIG 281 (H) 06/13/2018    TRIG 160 (H) 02/20/2018    TRIG 211 (H) 07/12/2017     Lab Results   Component Value Date    CHOLHDL 19.4 (L) 06/13/2018    CHOLHDL 24.5 02/20/2018    CHOLHDL 23.3 07/12/2017     Stable and controlled on simvastatin. Continue current treatment plan as previously prescribed with your PCP and cardiologist.

## 2018-11-26 NOTE — ASSESSMENT & PLAN NOTE
Stable and controlled. Continue current treatment plan as previously prescribed with your cardiologist.

## 2018-11-26 NOTE — ASSESSMENT & PLAN NOTE
Lab Results   Component Value Date    WBC 5.58 06/13/2018    HGB 11.4 (L) 06/13/2018    HCT 33.1 (L) 06/13/2018    MCV 90 06/13/2018     06/13/2018     Stable. Continue current treatment plan as previously prescribed with your PCP and heme/onc.

## 2018-11-26 NOTE — PROGRESS NOTES
"Curtis Landry presented for a  Medicare AWV and comprehensive Health Risk Assessment today. His cousin Adelaida is present to assist. The following components were reviewed and updated:    · Medical history  · Family History  · Social history  · Allergies and Current Medications  · Health Risk Assessment  · Health Maintenance  · Care Team     ** See Completed Assessments for Annual Wellness Visit within the encounter summary.**       The following assessments were completed:  · Living Situation  · CAGE  · Depression Screening  · Timed Get Up and Go  · Whisper Test  · Cognitive Function Screening  · Nutrition Screening  · ADL Screening  · PAQ Screening    Vitals:    11/26/18 1013   BP: 122/60   BP Location: Right arm   Pulse: 68   Temp: 97.2 °F (36.2 °C)   TempSrc: Tympanic   SpO2: 96%   Weight: 91.2 kg (201 lb 1 oz)   Height: 5' 9" (1.753 m)     Body mass index is 29.69 kg/m².  Physical Exam      Diagnoses and health risks identified today and associated recommendations/orders:    1. Encounter for preventive health examination  Reviewed H/M. Unable to do minicog. Patient verbalized poor eye sight and unable to draw clock. Stated he was unable to count back from 100 by 5's. Good immediate 3-word recall but only 1 word after 3 minutes.    ARF (acute renal failure)  Most recent BMP in June normal. Overdue for repeat. Will check today.    Prostate cancer  Lab Results   Component Value Date    PSA <0.010 07/31/2013    PSA <0.010 04/09/2012    PSA <0.1 03/16/2006     Stable and controlled. Continue current treatment plan as previously prescribed with your urologist.     CHF (NYHA class III, ACC/AHA stage C)  Stable and controlled. Continue current treatment plan as previously prescribed with your cardiologist.    Anemia of chronic renal failure, stage 3 (moderate)  Lab Results   Component Value Date    WBC 5.58 06/13/2018    HGB 11.4 (L) 06/13/2018    HCT 33.1 (L) 06/13/2018    MCV 90 06/13/2018     06/13/2018 "     Stable. Continue current treatment plan as previously prescribed with your PCP and heme/onc.    Essential hypertension  Stable and controlled. Continue current treatment plan as previously prescribed with your PCP and cardiologist.    MGUS (monoclonal gammopathy of unknown significance)  Followed by Dr. White.    Hyperlipidemia  Lab Results   Component Value Date    CHOL 139 06/13/2018    CHOL 147 02/20/2018    CHOL 150 07/12/2017     Lab Results   Component Value Date    HDL 27 (L) 06/13/2018    HDL 36 (L) 02/20/2018    HDL 35 (L) 07/12/2017     Lab Results   Component Value Date    LDLCALC 55.8 (L) 06/13/2018    LDLCALC 79.0 02/20/2018    LDLCALC 72.8 07/12/2017     Lab Results   Component Value Date    TRIG 281 (H) 06/13/2018    TRIG 160 (H) 02/20/2018    TRIG 211 (H) 07/12/2017     Lab Results   Component Value Date    CHOLHDL 19.4 (L) 06/13/2018    CHOLHDL 24.5 02/20/2018    CHOLHDL 23.3 07/12/2017     Stable and controlled on simvastatin. Continue current treatment plan as previously prescribed with your PCP and cardiologist.       Glaucoma (increased eye pressure)  Poor vision even with glasses. Followed by Dr. Iglesias. Last visit last week.    Automatic implantable cardioverter-defibrillator in situ  Stable and controlled. Continue current treatment plan as previously prescribed with your cardiologist.            Provided Curtis GRAVES with a 5-10 year written screening schedule and personal prevention plan. Recommendations were developed using the USPSTF age appropriate recommendations. Education, counseling, and referrals were provided as needed. After Visit Summary printed and given to patient which includes a list of additional screenings\tests needed.    Follow-up in about 1 year (around 11/26/2019) for annual HRA.    Staci Mckinnon NP

## 2018-11-26 NOTE — ASSESSMENT & PLAN NOTE
Stable and controlled. Continue current treatment plan as previously prescribed with your PCP and cardiologist.

## 2018-11-26 NOTE — ASSESSMENT & PLAN NOTE
Lab Results   Component Value Date    PSA <0.010 07/31/2013    PSA <0.010 04/09/2012    PSA <0.1 03/16/2006     Stable and controlled. Continue current treatment plan as previously prescribed with your urologist.

## 2018-11-26 NOTE — PATIENT INSTRUCTIONS
Counseling and Referral of Other Preventative  (Italic type indicates deductible and co-insurance are waived)    Patient Name: Curtis Landry  Today's Date: 11/26/2018    Health Maintenance       Date Due Completion Date    TETANUS VACCINE 11/17/1958 ---    Influenza Vaccine 08/01/2018 11/1/2017 (Done)    Override on 11/1/2017: Done    Lipid Panel 06/13/2019 6/13/2018        No orders of the defined types were placed in this encounter.    The following information is provided to all patients.  This information is to help you find resources for any of the problems found today that may be affecting your health:                Living healthy guide: www.[facility].[place].[facility]      Understanding Diabetes: www.diabetes.org      Eating healthy: www.cdc.gov/healthyweight      [facility] home safety checklist: www.cdc.gov/steadi/patient.html      Agency on Aging: www.goea.[place]      Alcoholics anonymous (AA): www.aa.org      Physical Activity: www.rachelle.nih.gov/tn5gteg      Tobacco use: www.quitwithusla.org

## 2018-11-28 ENCOUNTER — TELEPHONE (OUTPATIENT)
Dept: INTERNAL MEDICINE | Facility: CLINIC | Age: 78
End: 2018-11-28

## 2018-11-28 NOTE — TELEPHONE ENCOUNTER
----- Message from Staci Mckinnon NP sent at 11/28/2018  8:11 AM CST -----  Please notify patient lipids look good. Triglycerides down within normal. CMP within acceptable limits and kidney function normal.

## 2018-12-11 ENCOUNTER — CLINICAL SUPPORT (OUTPATIENT)
Dept: CARDIOLOGY | Facility: CLINIC | Age: 78
End: 2018-12-11
Attending: NUCLEAR MEDICINE
Payer: MEDICARE

## 2018-12-11 ENCOUNTER — OFFICE VISIT (OUTPATIENT)
Dept: CARDIOLOGY | Facility: CLINIC | Age: 78
End: 2018-12-11
Payer: MEDICARE

## 2018-12-11 VITALS
SYSTOLIC BLOOD PRESSURE: 120 MMHG | HEART RATE: 60 BPM | DIASTOLIC BLOOD PRESSURE: 70 MMHG | HEIGHT: 69 IN | WEIGHT: 200 LBS | BODY MASS INDEX: 29.62 KG/M2

## 2018-12-11 DIAGNOSIS — I25.5 ISCHEMIC CARDIOMYOPATHY: ICD-10-CM

## 2018-12-11 DIAGNOSIS — I10 ESSENTIAL HYPERTENSION: ICD-10-CM

## 2018-12-11 DIAGNOSIS — Z95.810 AUTOMATIC IMPLANTABLE CARDIOVERTER-DEFIBRILLATOR IN SITU: ICD-10-CM

## 2018-12-11 DIAGNOSIS — Z95.810 ICD (IMPLANTABLE CARDIOVERTER-DEFIBRILLATOR) IN PLACE: ICD-10-CM

## 2018-12-11 DIAGNOSIS — I50.9 CHF (NYHA CLASS III, ACC/AHA STAGE C): ICD-10-CM

## 2018-12-11 DIAGNOSIS — I50.9 CHF (NYHA CLASS III, ACC/AHA STAGE C): Primary | Chronic | ICD-10-CM

## 2018-12-11 PROCEDURE — 99999 PR PBB SHADOW E&M-EST. PATIENT-LVL III: CPT | Mod: PBBFAC,HCNC,, | Performed by: NUCLEAR MEDICINE

## 2018-12-11 PROCEDURE — 1101F PT FALLS ASSESS-DOCD LE1/YR: CPT | Mod: CPTII,HCNC,S$GLB, | Performed by: NUCLEAR MEDICINE

## 2018-12-11 PROCEDURE — 99214 OFFICE O/P EST MOD 30 MIN: CPT | Mod: HCNC,S$GLB,, | Performed by: NUCLEAR MEDICINE

## 2018-12-11 PROCEDURE — 93283 PRGRMG EVAL IMPLANTABLE DFB: CPT | Mod: HCNC,S$GLB,, | Performed by: INTERNAL MEDICINE

## 2018-12-11 PROCEDURE — 3078F DIAST BP <80 MM HG: CPT | Mod: CPTII,HCNC,S$GLB, | Performed by: NUCLEAR MEDICINE

## 2018-12-11 PROCEDURE — 3074F SYST BP LT 130 MM HG: CPT | Mod: CPTII,HCNC,S$GLB, | Performed by: NUCLEAR MEDICINE

## 2018-12-11 NOTE — PROGRESS NOTES
Subjective:   Patient ID:  Curtis Landry is a 78 y.o. male who presents for follow-up of Congestive Heart Failure; Hypertension; and Hyperlipidemia      HPI 1- CHRONIC HFrEF, IMPROVED, FC 3- STAGE C,  ISCHEMIC CMP , POST ICD   2- ESSENTIAL HTN  NO RECENT HOSPITALIZATIONS OR ED VISITS FOR ADHF, OR ARRHYTHMIAS OR ACS  NO RECENT ICD DISCHARGE  NO ANGINA OR EQUIVALENT  NO ORTHOPNEA OR PND  NO PALPITATIONS. NO NEAR SYNCOPE OR SYNCOPE  NO ABNORMAL BLEEDING  NO EDEMA. NO CALVE TENDERNESS. NO INTERMITTENT CLAUDICATION  CARD MED GOOD COMPLIANCE  NO FOCAL CNS SYMPTOMS OR SIGNS TO SUGGEST TIA OR STROKE    Review of Systems   Constitution: Negative for chills, fever, weakness, night sweats, weight gain and weight loss.   HENT: Negative for nosebleeds.    Eyes: Negative for blurred vision, double vision and visual disturbance.   Cardiovascular: Positive for dyspnea on exertion. Negative for chest pain, irregular heartbeat, leg swelling, orthopnea, palpitations, paroxysmal nocturnal dyspnea and syncope.   Respiratory: Negative for cough, hemoptysis and wheezing.    Endocrine: Negative for polydipsia and polyuria.   Hematologic/Lymphatic: Does not bruise/bleed easily.   Skin: Negative for rash.   Musculoskeletal: Negative for joint pain, joint swelling, muscle weakness and myalgias.   Gastrointestinal: Negative for abdominal pain, hematemesis, jaundice and melena.   Genitourinary: Negative for dysuria, hematuria and nocturia.   Neurological: Negative for dizziness, focal weakness, headaches and sensory change.   Psychiatric/Behavioral: Negative for depression. The patient does not have insomnia and is not nervous/anxious.      Family History   Problem Relation Age of Onset    Cancer Mother         pancreas    Cancer Father      Past Medical History:   Diagnosis Date    Anemia     Angina pectoris     Arthritis     CHF (congestive heart failure)     Glaucoma (increased eye pressure)     Followed by outside opthalmology     HTN (hypertension)     Hyperlipidemia     Macular degeneration     Pneumonia     did not require hospitalization    Prostate cancer     Urinary incontinence      Current Outpatient Medications on File Prior to Visit   Medication Sig Dispense Refill    acetaminophen (TYLENOL) 650 MG TbSR Take 1 tablet (650 mg total) by mouth every 8 (eight) hours.  0    aspirin (ECOTRIN) 81 MG EC tablet Take by mouth. 1 Tablet, Delayed Release (E.C.) Oral Every day      carvedilol (COREG) 25 MG tablet TAKE 1 TABLET (25 MG TOTAL) BY MOUTH 2 (TWO) TIMES DAILY. 180 tablet 3    cyanocobalamin (VITAMIN B-12) 1000 MCG tablet Take by mouth. 1 Tablet Oral Every day      dorzolamide-timolol 2-0.5% (COSOPT) 22.3-6.8 mg/mL ophthalmic solution Place 1 drop into both eyes 2 (two) times daily.  4    lisinopril 10 MG tablet Take 1 tablet (10 mg total) by mouth once daily. 30 tablet 6    MULTIVITS,TH W-FE,OTHER MIN (COMPLETE MULTIVITAMIN ORAL) Take 1 tablet by mouth once daily.      simvastatin (ZOCOR) 40 MG tablet TAKE 1 TABLET (40 MG TOTAL) BY MOUTH EVERY EVENING. 90 tablet 2    travoprost (TRAVATAN Z) 0.004 % Drop Inject into the eye. 1 Drops Ophthalmic At bedtime      nitroGLYCERIN (NITROSTAT) 0.4 MG SL tablet PLACE 1 TABLET BY MOUTH UNDER TONGUE EVERY 5 MINUTES AS NEEDED FOR CHEST PAIN 100 tablet 0     No current facility-administered medications on file prior to visit.      Review of patient's allergies indicates:   Allergen Reactions    No known drug allergies        Objective:     Physical Exam   Constitutional: He is oriented to person, place, and time. He appears well-developed. No distress.   HENT:   Head: Normocephalic.   Eyes: Conjunctivae are normal. Pupils are equal, round, and reactive to light.   Neck: Neck supple. No JVD present. No thyromegaly present.   Cardiovascular: Normal rate, regular rhythm, normal heart sounds and intact distal pulses. Exam reveals no gallop and no friction rub.   No murmur  heard.  Pulses:       Carotid pulses are 2+ on the right side, and 2+ on the left side.       Radial pulses are 2+ on the right side, and 2+ on the left side.        Femoral pulses are 2+ on the right side, and 2+ on the left side.       Popliteal pulses are 2+ on the right side, and 2+ on the left side.        Dorsalis pedis pulses are 2+ on the right side, and 2+ on the left side.        Posterior tibial pulses are 2+ on the right side, and 2+ on the left side.   Pulmonary/Chest: Breath sounds normal. He has no wheezes. He has no rales. He exhibits no tenderness.   Abdominal: Soft. Bowel sounds are normal. He exhibits no mass. There is no hepatosplenomegaly. There is no tenderness.   Musculoskeletal: He exhibits no edema or tenderness.        Cervical back: Normal.        Thoracic back: Normal.        Lumbar back: Normal.   Lymphadenopathy:     He has no cervical adenopathy.     He has no axillary adenopathy.        Right: No supraclavicular adenopathy present.        Left: No supraclavicular adenopathy present.   Neurological: He is alert and oriented to person, place, and time. He has normal strength. No sensory deficit. Gait normal.   Skin: Skin is warm. No cyanosis. No pallor. Nails show no clubbing.   Psychiatric: He has a normal mood and affect. His speech is normal and behavior is normal. Cognition and memory are normal.       Assessment:     1. CHF (NYHA class III, ACC/AHA stage C)    2. Ischemic cardiomyopathy    3. Automatic implantable cardioverter-defibrillator in situ    4. Essential hypertension      CLINICALLY WELL COMPENSATED HF  BP WELL CONTROLLED  NO EVIDENCE OF ACTIVE MYOCARDIAL ISCHEMIA  NO ARRHYTHMIAS  CNS STATUS STABLE  CARD MED WELL TOLERATED  Plan:     CHF (NYHA class III, ACC/AHA stage C)    Ischemic cardiomyopathy    Automatic implantable cardioverter-defibrillator in situ    Essential hypertension    1- CONTINUE PRESENT CARD MANAGEMENT    2-RETURN IN 6 MONTHS

## 2019-02-28 DIAGNOSIS — I25.5 ISCHEMIC CARDIOMYOPATHY: ICD-10-CM

## 2019-02-28 DIAGNOSIS — I10 ESSENTIAL HYPERTENSION: ICD-10-CM

## 2019-02-28 DIAGNOSIS — I50.9 CHF (NYHA CLASS III, ACC/AHA STAGE C): Chronic | ICD-10-CM

## 2019-02-28 RX ORDER — LISINOPRIL 10 MG/1
10 TABLET ORAL DAILY
Qty: 90 TABLET | Refills: 3 | Status: SHIPPED | OUTPATIENT
Start: 2019-02-28 | End: 2019-10-25 | Stop reason: ALTCHOICE

## 2019-03-18 ENCOUNTER — CLINICAL SUPPORT (OUTPATIENT)
Dept: CARDIOLOGY | Facility: CLINIC | Age: 79
End: 2019-03-18
Attending: NUCLEAR MEDICINE
Payer: MEDICARE

## 2019-03-18 DIAGNOSIS — Z95.810 ICD (IMPLANTABLE CARDIOVERTER-DEFIBRILLATOR) IN PLACE: ICD-10-CM

## 2019-03-18 DIAGNOSIS — I50.9 CHF (NYHA CLASS III, ACC/AHA STAGE C): ICD-10-CM

## 2019-03-18 DIAGNOSIS — I25.5 ISCHEMIC CARDIOMYOPATHY: ICD-10-CM

## 2019-03-18 PROCEDURE — 93283 PRGRMG EVAL IMPLANTABLE DFB: CPT | Mod: HCNC,S$GLB,, | Performed by: INTERNAL MEDICINE

## 2019-03-18 PROCEDURE — 93283 ICD PROGRAMMING: ICD-10-PCS | Mod: HCNC,S$GLB,, | Performed by: INTERNAL MEDICINE

## 2019-05-13 ENCOUNTER — LAB VISIT (OUTPATIENT)
Dept: LAB | Facility: HOSPITAL | Age: 79
End: 2019-05-13
Attending: UROLOGY
Payer: MEDICARE

## 2019-05-13 ENCOUNTER — OFFICE VISIT (OUTPATIENT)
Dept: UROLOGY | Facility: CLINIC | Age: 79
End: 2019-05-13
Payer: MEDICARE

## 2019-05-13 VITALS
HEIGHT: 69 IN | BODY MASS INDEX: 29.36 KG/M2 | SYSTOLIC BLOOD PRESSURE: 138 MMHG | WEIGHT: 198.19 LBS | DIASTOLIC BLOOD PRESSURE: 84 MMHG

## 2019-05-13 DIAGNOSIS — C61 PROSTATE CANCER: ICD-10-CM

## 2019-05-13 DIAGNOSIS — C61 PROSTATE CANCER: Primary | ICD-10-CM

## 2019-05-13 LAB
BILIRUB SERPL-MCNC: NORMAL MG/DL
BLOOD URINE, POC: NORMAL
COLOR, POC UA: YELLOW
GLUCOSE UR QL STRIP: NORMAL
KETONES UR QL STRIP: NORMAL
LEUKOCYTE ESTERASE URINE, POC: NORMAL
NITRITE, POC UA: NORMAL
PH, POC UA: 6
PROTEIN, POC: NORMAL
SPECIFIC GRAVITY, POC UA: 1.02
UROBILINOGEN, POC UA: NORMAL

## 2019-05-13 PROCEDURE — 1101F PT FALLS ASSESS-DOCD LE1/YR: CPT | Mod: HCNC,CPTII,S$GLB, | Performed by: UROLOGY

## 2019-05-13 PROCEDURE — 81002 POCT URINE DIPSTICK WITHOUT MICROSCOPE: ICD-10-PCS | Mod: HCNC,S$GLB,, | Performed by: UROLOGY

## 2019-05-13 PROCEDURE — 99214 PR OFFICE/OUTPT VISIT, EST, LEVL IV, 30-39 MIN: ICD-10-PCS | Mod: HCNC,25,S$GLB, | Performed by: UROLOGY

## 2019-05-13 PROCEDURE — 36415 COLL VENOUS BLD VENIPUNCTURE: CPT | Mod: HCNC

## 2019-05-13 PROCEDURE — 99999 PR PBB SHADOW E&M-EST. PATIENT-LVL III: CPT | Mod: PBBFAC,HCNC,, | Performed by: UROLOGY

## 2019-05-13 PROCEDURE — 84153 ASSAY OF PSA TOTAL: CPT | Mod: HCNC

## 2019-05-13 PROCEDURE — 1101F PR PT FALLS ASSESS DOC 0-1 FALLS W/OUT INJ PAST YR: ICD-10-PCS | Mod: HCNC,CPTII,S$GLB, | Performed by: UROLOGY

## 2019-05-13 PROCEDURE — 3075F SYST BP GE 130 - 139MM HG: CPT | Mod: HCNC,CPTII,S$GLB, | Performed by: UROLOGY

## 2019-05-13 PROCEDURE — 3079F DIAST BP 80-89 MM HG: CPT | Mod: HCNC,CPTII,S$GLB, | Performed by: UROLOGY

## 2019-05-13 PROCEDURE — 3075F PR MOST RECENT SYSTOLIC BLOOD PRESS GE 130-139MM HG: ICD-10-PCS | Mod: HCNC,CPTII,S$GLB, | Performed by: UROLOGY

## 2019-05-13 PROCEDURE — 99214 OFFICE O/P EST MOD 30 MIN: CPT | Mod: HCNC,25,S$GLB, | Performed by: UROLOGY

## 2019-05-13 PROCEDURE — 3079F PR MOST RECENT DIASTOLIC BLOOD PRESSURE 80-89 MM HG: ICD-10-PCS | Mod: HCNC,CPTII,S$GLB, | Performed by: UROLOGY

## 2019-05-13 PROCEDURE — 81002 URINALYSIS NONAUTO W/O SCOPE: CPT | Mod: HCNC,S$GLB,, | Performed by: UROLOGY

## 2019-05-13 PROCEDURE — 99999 PR PBB SHADOW E&M-EST. PATIENT-LVL III: ICD-10-PCS | Mod: PBBFAC,HCNC,, | Performed by: UROLOGY

## 2019-05-13 NOTE — PROGRESS NOTES
Chief Complaint: Prostate Cancer    HPI:   5/13/19: Not using the HARRISON anymore not helping.  Reviewed history in detail.  PSA not done yet.  5/7/18: No recent PSA, doing well.  Voiding fine no complaints.  Wants a HARRISON rx.  8/17/16: Doing well no interval changes.   8/14/15: 73 yo man was dx with prostate cancer about 5 years ago by Dr. Trevino with subsequent prostatectomy.  Recovered well but then had ED.  After a time he started using a HARRISON and it was fine.  Lately it has stopped working for him because the pump is broken.  He also finds his scrotum gets pulled into the device sometimes.  And that the rings need adjustment.  No gross hematuria.  Has had no prostate cancer followup in years.  No urinary bother except some occasional drips and he uses a pad for many days.       Allergies:  No known drug allergies    Medications:  has a current medication list which includes the following prescription(s): acetaminophen, aspirin, carvedilol, cyanocobalamin, dorzolamide-timolol 2-0.5%, lisinopril, multivit,tx with iron,minerals, nitroglycerin, simvastatin, and travoprost.    Review of Systems:  General: No fever, chills, fatigability, or weight loss.  Skin: No rashes, itching, or changes in color or texture of skin.  Chest: Denies CORBIN, cyanosis, wheezing, cough, and sputum production.  Abdomen: Appetite fine. No weight loss. Denies diarrhea, abdominal pain, hematemesis, or blood in stool.  Musculoskeletal: Some joint stiffness or swelling. Some back pain.  : As above.  All other review of systems negative.    PMH:   has a past medical history of Anemia, Angina pectoris, Arthritis, CHF (congestive heart failure), Glaucoma (increased eye pressure), HTN (hypertension), Hyperlipidemia, Macular degeneration, Pneumonia, Prostate cancer, and Urinary incontinence.    PSH:   has a past surgical history that includes Gallbladder surgery; Total hip arthroplasty; Cardiac pacemaker placement; Cardiac defibrillator placement;  Prostatectomy; Appendectomy; and Cardiac pacemaker placement.    FamHx: family history includes Cancer in his father and mother.    SocHx:  reports that he quit smoking about 39 years ago. He quit after 20.00 years of use. He has never used smokeless tobacco. He reports that he does not drink alcohol or use drugs.      Physical Exam:  Vitals:    05/13/19 1355   BP: 138/84     General: A&Ox3, no apparent distress, no deformities  Neck: No masses, normal thyroid  Lungs: normal inspiration, no use of accessory muscles  Heart: normal pulse, no arrhythmias  Abdomen: Soft, NT, ND  Skin: The skin is warm and dry. No jaundice.  Ext: No c/c/e.  :   8/16: Test desc ally, no abnormalities of epididymus. Penis normal, with normal penile and scrotal skin. Meatus normal. Normal rectal tone, no hemorrhoids. Prost surgically absent. Perineum and anus normal.    Labs/Studies: none today  PSA    8/15, 8/16, 8/18: undetectable    Impression/Plan:   1. PSA and PSA annually with PCP.

## 2019-05-14 LAB — COMPLEXED PSA SERPL-MCNC: <0.01 NG/ML (ref 0–4)

## 2019-06-19 DIAGNOSIS — I50.9 CHF (NYHA CLASS III, ACC/AHA STAGE C): Chronic | ICD-10-CM

## 2019-06-19 DIAGNOSIS — I10 ESSENTIAL HYPERTENSION: Primary | ICD-10-CM

## 2019-06-24 RX ORDER — NITROGLYCERIN 0.4 MG/1
0.4 TABLET SUBLINGUAL EVERY 5 MIN PRN
Qty: 50 TABLET | Refills: 3 | Status: SHIPPED | OUTPATIENT
Start: 2019-06-24 | End: 2020-05-20

## 2019-10-25 ENCOUNTER — LAB VISIT (OUTPATIENT)
Dept: LAB | Facility: HOSPITAL | Age: 79
End: 2019-10-25
Attending: INTERNAL MEDICINE
Payer: MEDICARE

## 2019-10-25 ENCOUNTER — OFFICE VISIT (OUTPATIENT)
Dept: CARDIOLOGY | Facility: CLINIC | Age: 79
End: 2019-10-25
Payer: MEDICARE

## 2019-10-25 VITALS
HEART RATE: 82 BPM | BODY MASS INDEX: 29.4 KG/M2 | DIASTOLIC BLOOD PRESSURE: 60 MMHG | WEIGHT: 199.06 LBS | SYSTOLIC BLOOD PRESSURE: 72 MMHG

## 2019-10-25 DIAGNOSIS — I50.9 CHF (NYHA CLASS III, ACC/AHA STAGE C): Chronic | ICD-10-CM

## 2019-10-25 DIAGNOSIS — I10 ESSENTIAL HYPERTENSION: ICD-10-CM

## 2019-10-25 DIAGNOSIS — I95.9 HYPOTENSION, UNSPECIFIED HYPOTENSION TYPE: Primary | ICD-10-CM

## 2019-10-25 DIAGNOSIS — E78.5 HYPERLIPIDEMIA, UNSPECIFIED HYPERLIPIDEMIA TYPE: ICD-10-CM

## 2019-10-25 DIAGNOSIS — I42.8 NICM (NONISCHEMIC CARDIOMYOPATHY): ICD-10-CM

## 2019-10-25 LAB
ALBUMIN SERPL BCP-MCNC: 4.2 G/DL (ref 3.5–5.2)
ALP SERPL-CCNC: 40 U/L (ref 55–135)
ALT SERPL W/O P-5'-P-CCNC: 18 U/L (ref 10–44)
ANION GAP SERPL CALC-SCNC: 7 MMOL/L (ref 8–16)
AST SERPL-CCNC: 18 U/L (ref 10–40)
BASOPHILS # BLD AUTO: 0.03 K/UL (ref 0–0.2)
BASOPHILS NFR BLD: 0.5 % (ref 0–1.9)
BILIRUB SERPL-MCNC: 0.6 MG/DL (ref 0.1–1)
BNP SERPL-MCNC: 22 PG/ML (ref 0–99)
BUN SERPL-MCNC: 36 MG/DL (ref 8–23)
CALCIUM SERPL-MCNC: 9.8 MG/DL (ref 8.7–10.5)
CHLORIDE SERPL-SCNC: 107 MMOL/L (ref 95–110)
CO2 SERPL-SCNC: 25 MMOL/L (ref 23–29)
CREAT SERPL-MCNC: 2.8 MG/DL (ref 0.5–1.4)
DIFFERENTIAL METHOD: ABNORMAL
EOSINOPHIL # BLD AUTO: 0.1 K/UL (ref 0–0.5)
EOSINOPHIL NFR BLD: 1.6 % (ref 0–8)
ERYTHROCYTE [DISTWIDTH] IN BLOOD BY AUTOMATED COUNT: 12.1 % (ref 11.5–14.5)
EST. GFR  (AFRICAN AMERICAN): 23.9 ML/MIN/1.73 M^2
EST. GFR  (NON AFRICAN AMERICAN): 20.7 ML/MIN/1.73 M^2
GLUCOSE SERPL-MCNC: 111 MG/DL (ref 70–110)
HCT VFR BLD AUTO: 35.4 % (ref 40–54)
HGB BLD-MCNC: 11.8 G/DL (ref 14–18)
IMM GRANULOCYTES # BLD AUTO: 0.01 K/UL (ref 0–0.04)
IMM GRANULOCYTES NFR BLD AUTO: 0.2 % (ref 0–0.5)
LYMPHOCYTES # BLD AUTO: 2.2 K/UL (ref 1–4.8)
LYMPHOCYTES NFR BLD: 39.7 % (ref 18–48)
MCH RBC QN AUTO: 31 PG (ref 27–31)
MCHC RBC AUTO-ENTMCNC: 33.3 G/DL (ref 32–36)
MCV RBC AUTO: 93 FL (ref 82–98)
MONOCYTES # BLD AUTO: 0.5 K/UL (ref 0.3–1)
MONOCYTES NFR BLD: 9.2 % (ref 4–15)
NEUTROPHILS # BLD AUTO: 2.7 K/UL (ref 1.8–7.7)
NEUTROPHILS NFR BLD: 48.8 % (ref 38–73)
NRBC BLD-RTO: 0 /100 WBC
PLATELET # BLD AUTO: 211 K/UL (ref 150–350)
PMV BLD AUTO: 10 FL (ref 9.2–12.9)
POTASSIUM SERPL-SCNC: 5.3 MMOL/L (ref 3.5–5.1)
PROT SERPL-MCNC: 8.4 G/DL (ref 6–8.4)
RBC # BLD AUTO: 3.81 M/UL (ref 4.6–6.2)
SODIUM SERPL-SCNC: 139 MMOL/L (ref 136–145)
TSH SERPL DL<=0.005 MIU/L-ACNC: 1.62 UIU/ML (ref 0.4–4)
WBC # BLD AUTO: 5.56 K/UL (ref 3.9–12.7)

## 2019-10-25 PROCEDURE — 83880 ASSAY OF NATRIURETIC PEPTIDE: CPT | Mod: HCNC

## 2019-10-25 PROCEDURE — 99499 UNLISTED E&M SERVICE: CPT | Mod: HCNC,S$GLB,, | Performed by: INTERNAL MEDICINE

## 2019-10-25 PROCEDURE — 3078F DIAST BP <80 MM HG: CPT | Mod: HCNC,CPTII,S$GLB, | Performed by: INTERNAL MEDICINE

## 2019-10-25 PROCEDURE — 99214 PR OFFICE/OUTPT VISIT, EST, LEVL IV, 30-39 MIN: ICD-10-PCS | Mod: HCNC,S$GLB,, | Performed by: INTERNAL MEDICINE

## 2019-10-25 PROCEDURE — 3074F SYST BP LT 130 MM HG: CPT | Mod: HCNC,CPTII,S$GLB, | Performed by: INTERNAL MEDICINE

## 2019-10-25 PROCEDURE — 3078F PR MOST RECENT DIASTOLIC BLOOD PRESSURE < 80 MM HG: ICD-10-PCS | Mod: HCNC,CPTII,S$GLB, | Performed by: INTERNAL MEDICINE

## 2019-10-25 PROCEDURE — 36415 COLL VENOUS BLD VENIPUNCTURE: CPT | Mod: HCNC

## 2019-10-25 PROCEDURE — 84443 ASSAY THYROID STIM HORMONE: CPT | Mod: HCNC

## 2019-10-25 PROCEDURE — 80053 COMPREHEN METABOLIC PANEL: CPT | Mod: HCNC

## 2019-10-25 PROCEDURE — 85025 COMPLETE CBC W/AUTO DIFF WBC: CPT | Mod: HCNC

## 2019-10-25 PROCEDURE — 99214 OFFICE O/P EST MOD 30 MIN: CPT | Mod: HCNC,S$GLB,, | Performed by: INTERNAL MEDICINE

## 2019-10-25 PROCEDURE — 99499 RISK ADDL DX/OHS AUDIT: ICD-10-PCS | Mod: HCNC,S$GLB,, | Performed by: INTERNAL MEDICINE

## 2019-10-25 PROCEDURE — 99999 PR PBB SHADOW E&M-EST. PATIENT-LVL III: ICD-10-PCS | Mod: PBBFAC,HCNC,, | Performed by: INTERNAL MEDICINE

## 2019-10-25 PROCEDURE — 99999 PR PBB SHADOW E&M-EST. PATIENT-LVL III: CPT | Mod: PBBFAC,HCNC,, | Performed by: INTERNAL MEDICINE

## 2019-10-25 PROCEDURE — 1101F PR PT FALLS ASSESS DOC 0-1 FALLS W/OUT INJ PAST YR: ICD-10-PCS | Mod: HCNC,CPTII,S$GLB, | Performed by: INTERNAL MEDICINE

## 2019-10-25 PROCEDURE — 1101F PT FALLS ASSESS-DOCD LE1/YR: CPT | Mod: HCNC,CPTII,S$GLB, | Performed by: INTERNAL MEDICINE

## 2019-10-25 PROCEDURE — 3074F PR MOST RECENT SYSTOLIC BLOOD PRESSURE < 130 MM HG: ICD-10-PCS | Mod: HCNC,CPTII,S$GLB, | Performed by: INTERNAL MEDICINE

## 2019-10-25 NOTE — PROGRESS NOTES
Subjective:   Patient ID:  Curtis Landry is a 78 y.o. male who presents for follow up of Chest Pain and Dizziness      79 yo male  PMH NICM, CHFrEF was 25% in  LHC showed nonobstructive. Repeat echo in 2018 normal EF, s/p ICD, HTN, HLD, PAF 6 seconds per ICD interrogation in , PVC   ekg today NSR, PVC monophasic, LBBB pattern inferior axis, switch R on V3.   Weight stable for few years.  C/o SOB, chest, back and hip. NTG SL seem work. Recently, uses a lot,  good appettite.  CORBIN, and dizziness.   Today BP 77/62 mmHG            Past Medical History:   Diagnosis Date    Anemia     Angina pectoris     Arthritis     CHF (congestive heart failure)     Glaucoma (increased eye pressure)     Followed by outside opthalmology    HTN (hypertension)     Hyperlipidemia     Macular degeneration     Pneumonia     did not require hospitalization    Prostate cancer     Urinary incontinence        Past Surgical History:   Procedure Laterality Date    APPENDECTOMY      CARDIAC DEFIBRILLATOR PLACEMENT      CARDIAC PACEMAKER PLACEMENT      CARDIAC PACEMAKER PLACEMENT      GALLBLADDER SURGERY      PROSTATECTOMY      TOTAL HIP ARTHROPLASTY         Social History     Tobacco Use    Smoking status: Former Smoker     Years: 20.00     Last attempt to quit: 1980     Years since quittin.8    Smokeless tobacco: Never Used   Substance Use Topics    Alcohol use: No    Drug use: No       Family History   Problem Relation Age of Onset    Cancer Mother         pancreas    Cancer Father          Review of Systems   Constitution: Positive for malaise/fatigue. Negative for decreased appetite, diaphoresis, fever and night sweats.   HENT: Negative for nosebleeds.    Eyes: Negative for blurred vision and double vision.   Cardiovascular: Positive for dyspnea on exertion. Negative for chest pain, claudication, irregular heartbeat, leg swelling, near-syncope, orthopnea, palpitations, paroxysmal nocturnal dyspnea  and syncope.   Respiratory: Negative for cough, shortness of breath, sleep disturbances due to breathing, snoring, sputum production and wheezing.    Endocrine: Negative for cold intolerance and polyuria.   Hematologic/Lymphatic: Does not bruise/bleed easily.   Skin: Negative for rash.   Musculoskeletal: Negative for back pain, falls, joint pain, joint swelling and neck pain.   Gastrointestinal: Negative for abdominal pain, heartburn, nausea and vomiting.   Genitourinary: Negative for dysuria, frequency and hematuria.   Neurological: Positive for dizziness and light-headedness. Negative for difficulty with concentration, focal weakness, headaches, numbness, seizures and weakness.   Psychiatric/Behavioral: Negative for depression, memory loss and substance abuse. The patient does not have insomnia.    Allergic/Immunologic: Negative for HIV exposure and hives.       Objective:   Physical Exam   Constitutional: He is oriented to person, place, and time. He appears well-nourished.   HENT:   Head: Normocephalic.   Eyes: Pupils are equal, round, and reactive to light.   Neck: Normal carotid pulses and no JVD present. Carotid bruit is not present. No thyromegaly present.   Cardiovascular: Normal rate, regular rhythm, normal heart sounds and normal pulses.  No extrasystoles are present. PMI is not displaced. Exam reveals no gallop and no S3.   No murmur heard.  Pulmonary/Chest: Breath sounds normal. No stridor. No respiratory distress.   Abdominal: Soft. Bowel sounds are normal. There is no tenderness. There is no rebound.   Musculoskeletal: Normal range of motion.   Neurological: He is alert and oriented to person, place, and time.   Skin: Skin is intact. No rash noted.   Psychiatric: His behavior is normal.       Lab Results   Component Value Date    CHOL 147 11/26/2018    CHOL 139 06/13/2018    CHOL 147 02/20/2018     Lab Results   Component Value Date    HDL 36 (L) 11/26/2018    HDL 27 (L) 06/13/2018    HDL 36 (L)  02/20/2018     Lab Results   Component Value Date    LDLCALC 83.8 11/26/2018    LDLCALC 55.8 (L) 06/13/2018    LDLCALC 79.0 02/20/2018     Lab Results   Component Value Date    TRIG 136 11/26/2018    TRIG 281 (H) 06/13/2018    TRIG 160 (H) 02/20/2018     Lab Results   Component Value Date    CHOLHDL 24.5 11/26/2018    CHOLHDL 19.4 (L) 06/13/2018    CHOLHDL 24.5 02/20/2018       Chemistry        Component Value Date/Time     11/26/2018 1137    K 4.2 11/26/2018 1137     11/26/2018 1137    CO2 27 11/26/2018 1137    BUN 13 11/26/2018 1137    CREATININE 1.2 11/26/2018 1137     11/26/2018 1137        Component Value Date/Time    CALCIUM 10.4 11/26/2018 1137    ALKPHOS 42 (L) 11/26/2018 1137    AST 23 11/26/2018 1137    ALT 25 11/26/2018 1137    BILITOT 0.7 11/26/2018 1137    ESTGFRAFRICA >60.0 11/26/2018 1137    EGFRNONAA 57.6 (A) 11/26/2018 1137          Lab Results   Component Value Date    HGBA1C 5.1 02/13/2017     Lab Results   Component Value Date    TSH 0.841 02/20/2018     Lab Results   Component Value Date    INR 1.1 05/14/2016    INR 1.1 01/31/2013    INR 1.1 10/19/2012     Lab Results   Component Value Date    WBC 5.58 06/13/2018    HGB 11.4 (L) 06/13/2018    HCT 33.1 (L) 06/13/2018    MCV 90 06/13/2018     06/13/2018     BMP  Sodium   Date Value Ref Range Status   11/26/2018 142 136 - 145 mmol/L Final     Potassium   Date Value Ref Range Status   11/26/2018 4.2 3.5 - 5.1 mmol/L Final     Chloride   Date Value Ref Range Status   11/26/2018 108 95 - 110 mmol/L Final     CO2   Date Value Ref Range Status   11/26/2018 27 23 - 29 mmol/L Final     BUN, Bld   Date Value Ref Range Status   11/26/2018 13 8 - 23 mg/dL Final     Creatinine   Date Value Ref Range Status   11/26/2018 1.2 0.5 - 1.4 mg/dL Final     Calcium   Date Value Ref Range Status   11/26/2018 10.4 8.7 - 10.5 mg/dL Final     Anion Gap   Date Value Ref Range Status   11/26/2018 7 (L) 8 - 16 mmol/L Final     eGFR if African  American   Date Value Ref Range Status   11/26/2018 >60.0 >60 mL/min/1.73 m^2 Final     eGFR if non    Date Value Ref Range Status   11/26/2018 57.6 (A) >60 mL/min/1.73 m^2 Final     Comment:     Calculation used to obtain the estimated glomerular filtration  rate (eGFR) is the CKD-EPI equation.        BNP  @LABRCNTIP(BNP,BNPTRIAGEBLO)@  @LABRCNTIP(troponini)@  CrCl cannot be calculated (Patient's most recent lab result is older than the maximum 7 days allowed.).  No results found in the last 24 hours.  No results found in the last 24 hours.  No results found in the last 24 hours.    Assessment:      1. Hypotension, unspecified hypotension type    2. CHF (NYHA class III, ACC/AHA stage C)    3. NICM (nonischemic cardiomyopathy)    4. Essential hypertension    5. Hyperlipidemia, unspecified hyperlipidemia type        Plan:   Hypotension, unspecified hypotension type    CHF (NYHA class III, ACC/AHA stage C)  -     Comprehensive metabolic panel; Future; Expected date: 10/25/2019  -     CBC auto differential; Future; Expected date: 10/25/2019  -     TSH; Future; Expected date: 10/25/2019  -     Brain natriuretic peptide; Future; Expected date: 10/25/2019  Hold Lisinopril, coreg and simvastatin now  Encourage hydration  Recommend heart-healthy diet, weight control and regular exercise.  Kai. Risk modification.   RTC as indicated    I have reviewed all pertinent labs and cardiac studies. Plans and recommendations have been formulated under my direct supervision. All questions answered and patient voiced understanding. Patient to continue current medications.

## 2019-10-29 ENCOUNTER — TELEPHONE (OUTPATIENT)
Dept: CARDIOLOGY | Facility: CLINIC | Age: 79
End: 2019-10-29

## 2019-10-29 NOTE — TELEPHONE ENCOUNTER
Please advise on abnormal CMP.     Pt cousin Mr. Lobo notified TSH labs and verbalized understanding.

## 2019-10-29 NOTE — TELEPHONE ENCOUNTER
Also pt wants to know if he should stay off of his medications as directed at last office visit or continue meds.   Please advise

## 2019-10-30 NOTE — TELEPHONE ENCOUNTER
I spoke with pts family member. He will check pts bp and call us back.     Dr SYED. Did you have a chance to review the CMP? And when should he repeat labs?

## 2019-11-01 NOTE — TELEPHONE ENCOUNTER
Pt notified of needing appt with pcp for ARF    Pts caretaker Mr Mancini wanted you to know that his bp is 118/52 P-49 from today at 2:30pm

## 2019-11-05 NOTE — TELEPHONE ENCOUNTER
Spoke with pt, offered Nov 11th for Noe Sci ICD check per Dr. Teixeira.  Pt states he does not drive and will need to arrange transportation, prefers morning.  Scheduled pt on 11/18/19, [facility], 11am; written slip mailed.  Pt will call clinic if unable to get a ride.

## 2019-11-18 ENCOUNTER — TELEPHONE (OUTPATIENT)
Dept: CARDIOLOGY | Facility: CLINIC | Age: 79
End: 2019-11-18

## 2019-11-18 ENCOUNTER — LAB VISIT (OUTPATIENT)
Dept: LAB | Facility: HOSPITAL | Age: 79
End: 2019-11-18
Attending: NUCLEAR MEDICINE
Payer: MEDICARE

## 2019-11-18 ENCOUNTER — CLINICAL SUPPORT (OUTPATIENT)
Dept: CARDIOLOGY | Facility: CLINIC | Age: 79
End: 2019-11-18
Attending: NUCLEAR MEDICINE
Payer: MEDICARE

## 2019-11-18 DIAGNOSIS — I25.5 ISCHEMIC CARDIOMYOPATHY: ICD-10-CM

## 2019-11-18 DIAGNOSIS — I50.9 CHF (NYHA CLASS III, ACC/AHA STAGE C): Chronic | ICD-10-CM

## 2019-11-18 DIAGNOSIS — I50.9 CHF (NYHA CLASS III, ACC/AHA STAGE C): ICD-10-CM

## 2019-11-18 DIAGNOSIS — Z95.810 ICD (IMPLANTABLE CARDIOVERTER-DEFIBRILLATOR) IN PLACE: ICD-10-CM

## 2019-11-18 DIAGNOSIS — I50.9 CHF (NYHA CLASS III, ACC/AHA STAGE C): Primary | Chronic | ICD-10-CM

## 2019-11-18 LAB
ALBUMIN SERPL BCP-MCNC: 4 G/DL (ref 3.5–5.2)
ALP SERPL-CCNC: 50 U/L (ref 55–135)
ALT SERPL W/O P-5'-P-CCNC: 21 U/L (ref 10–44)
ANION GAP SERPL CALC-SCNC: 9 MMOL/L (ref 8–16)
AST SERPL-CCNC: 18 U/L (ref 10–40)
BILIRUB SERPL-MCNC: 0.6 MG/DL (ref 0.1–1)
BUN SERPL-MCNC: 17 MG/DL (ref 8–23)
CALCIUM SERPL-MCNC: 10.8 MG/DL (ref 8.7–10.5)
CHLORIDE SERPL-SCNC: 105 MMOL/L (ref 95–110)
CO2 SERPL-SCNC: 26 MMOL/L (ref 23–29)
CREAT SERPL-MCNC: 1.3 MG/DL (ref 0.5–1.4)
EST. GFR  (AFRICAN AMERICAN): 60 ML/MIN/1.73 M^2
EST. GFR  (NON AFRICAN AMERICAN): 52 ML/MIN/1.73 M^2
GLUCOSE SERPL-MCNC: 109 MG/DL (ref 70–110)
POTASSIUM SERPL-SCNC: 4.4 MMOL/L (ref 3.5–5.1)
PROT SERPL-MCNC: 8.7 G/DL (ref 6–8.4)
SODIUM SERPL-SCNC: 140 MMOL/L (ref 136–145)

## 2019-11-18 PROCEDURE — 80053 COMPREHEN METABOLIC PANEL: CPT | Mod: HCNC

## 2019-11-18 PROCEDURE — 93282 PRGRMG EVAL IMPLANTABLE DFB: CPT | Mod: HCNC,S$GLB,, | Performed by: INTERNAL MEDICINE

## 2019-11-18 PROCEDURE — 36415 COLL VENOUS BLD VENIPUNCTURE: CPT | Mod: HCNC

## 2019-11-18 PROCEDURE — 93282 ICD PROGRAMMING: ICD-10-PCS | Mod: HCNC,S$GLB,, | Performed by: INTERNAL MEDICINE

## 2019-11-18 NOTE — TELEPHONE ENCOUNTER
Pt is here today for pacemaker check.   He missed his routine f/u with Dr Boyd and saw Dr tolliver for f/u due to availability. As per last visit pt has been holding Lisinopril, coreg and simvastatin.     They are asking if he should remain off of them or go back taking them.  Today /60 P-82  He has not had any worsening sob.   I listened to his lungs. They are clear throughout   No LE edema  Labs reorded to check kidney fxn.

## 2019-12-04 ENCOUNTER — TELEPHONE (OUTPATIENT)
Dept: CARDIOLOGY | Facility: CLINIC | Age: 79
End: 2019-12-04

## 2019-12-04 DIAGNOSIS — I10 ESSENTIAL HYPERTENSION: Primary | ICD-10-CM

## 2019-12-04 RX ORDER — CARVEDILOL 12.5 MG/1
12.5 TABLET ORAL 2 TIMES DAILY
Qty: 60 TABLET | Refills: 11 | Status: SHIPPED | OUTPATIENT
Start: 2019-12-04 | End: 2020-11-11 | Stop reason: SDUPTHER

## 2019-12-04 NOTE — TELEPHONE ENCOUNTER
----- Message from Roosevelt Ryan sent at 12/4/2019  9:33 AM CST -----  Contact: [facility]  Caller called in regards to needing a refill on carvedilol 25mg. Caller can be reached at   [facility] #[id number] - [place] - [address] AT [facility]  [address]  [place] [address]  Phone: [phone] Fax: [phone]

## 2020-01-29 ENCOUNTER — TELEPHONE (OUTPATIENT)
Dept: ADMINISTRATIVE | Facility: HOSPITAL | Age: 80
End: 2020-01-29

## 2020-03-09 ENCOUNTER — TELEPHONE (OUTPATIENT)
Dept: CARDIOLOGY | Facility: CLINIC | Age: 80
End: 2020-03-09

## 2020-03-09 NOTE — TELEPHONE ENCOUNTER
Pt requesting handicap tag. Advised I will discuss with Dr Boyd tomorrow when he is here. I will call Bela Cortes-pts caretaker back and let her know.

## 2020-03-26 ENCOUNTER — TELEPHONE (OUTPATIENT)
Dept: CARDIOLOGY | Facility: CLINIC | Age: 80
End: 2020-03-26

## 2020-03-26 NOTE — TELEPHONE ENCOUNTER
----- Message from Kinjal Taylor RN sent at 3/25/2020  1:54 PM CDT -----  Contact: Bela Cortes- Relative  Yes, he does not have a home monitor.  He is not dependent and has 6 years on battery.  He can be scheduled in June.    ----- Message -----  From: Cielo Richard MA  Sent: 3/25/2020  12:38 PM CDT  To: Kinjal Taylor RN    Does this patient need their device checked in clinic?   ----- Message -----  From: Maria Victoria Mendoza  Sent: 3/25/2020  12:04 PM CDT  To: [facility] Staff    Please call concerning scheduling patient to see Dr. Boyd and having his device checked. Please call at  [phone] (Bela)

## 2020-03-26 NOTE — TELEPHONE ENCOUNTER
I was able to reach the patient's cousin and he said he would notify the patient he is welcome to schedule his appointment in June. He verbalized understanding and will call back with any further questions.

## 2020-04-13 ENCOUNTER — OFFICE VISIT (OUTPATIENT)
Dept: CARDIOLOGY | Facility: CLINIC | Age: 80
End: 2020-04-13
Payer: MEDICARE

## 2020-04-13 DIAGNOSIS — I42.8 NICM (NONISCHEMIC CARDIOMYOPATHY): ICD-10-CM

## 2020-04-13 DIAGNOSIS — I50.9 CHF (NYHA CLASS III, ACC/AHA STAGE C): Primary | Chronic | ICD-10-CM

## 2020-04-13 PROCEDURE — 1159F PR MEDICATION LIST DOCUMENTED IN MEDICAL RECORD: ICD-10-PCS | Mod: HCNC,95,, | Performed by: NUCLEAR MEDICINE

## 2020-04-13 PROCEDURE — 1159F MED LIST DOCD IN RCRD: CPT | Mod: HCNC,95,, | Performed by: NUCLEAR MEDICINE

## 2020-04-13 PROCEDURE — 99441 PR PHYSICIAN TELEPHONE EVALUATION 5-10 MIN: CPT | Mod: HCNC,95,, | Performed by: NUCLEAR MEDICINE

## 2020-04-13 PROCEDURE — 99441 PR PHYSICIAN TELEPHONE EVALUATION 5-10 MIN: ICD-10-PCS | Mod: HCNC,95,, | Performed by: NUCLEAR MEDICINE

## 2020-04-13 NOTE — PROGRESS NOTES
VIRTUAL AUDIO VISIT ONLY    PHONE CALL INTERACTION WITH PATIENT AND DAUGHTER IN [place]    PROBLEM-  WEAKNESS    FOR LAST WEEK HAS NOTICED SOME INCREASE IN WEAKNESS WITH ORDINARY ACTIVITIES  NO INCREASE CORBIN. NO DYSPNEA AT REST. NO PND. NO INCREASE NOCTURIA  NO CHANGES IN THE PATTERN OF CHEST DISCOMFORT- ONE NTG PER WEEK  NO NOCTURNAL OR CHEST DISCOMFORT AT REST  NO PALPITATIONS  NO FEVER, NO CHILLS. NO SORE THROAT.  NO MYALGIAS OR ARTHRALGIAS  NO FOCAL CNS SYMPTOMS OR SIGNS TO SUGGEST TIA OR STROKE    DENIES ANY INCREASE IN WEIGHT OR EDEMA OR CALVE TENDERNESS  NO ABDOMINAL DISCOMFORT    VS NO AVAILABLE    RECOMMENDATIONS-  REASSURANCE    HOME HEALTH VISIT TO CHECK HIS VS AND PULMONARY AUSCULTATION  AND REVIEW CARD MED- COMPLIANCE AND SIDE EFFECTS

## 2020-04-14 ENCOUNTER — TELEPHONE (OUTPATIENT)
Dept: CARDIOLOGY | Facility: CLINIC | Age: 80
End: 2020-04-14

## 2020-04-14 PROCEDURE — G0180 MD CERTIFICATION HHA PATIENT: HCPCS | Mod: ,,, | Performed by: NUCLEAR MEDICINE

## 2020-04-14 PROCEDURE — G0180 PR HOME HEALTH MD CERTIFICATION: ICD-10-PCS | Mod: ,,, | Performed by: NUCLEAR MEDICINE

## 2020-04-14 NOTE — TELEPHONE ENCOUNTER
"Kimberlee with [facility]  called report from her intake visit with pt today. Pt seems to be doing "ok". Generalized weakness, thinking maybe he can get PT to assist with mobility?    Taking tylenol 650mg x 2 daily for arthritis  Coreg 12.5mg bid  Asa 81mg daily  Eyedrops    Mar updated    Lung sounds are clear. No sob, no swelling pt denies any PND or orthopnea.   BP today 139/78, Wt 185lb today. Nurse will get pt a talking scale for stability and better wt accuracy.   O2 sat 98% on room air  P-78, temp 97.4.    No other problems at this time.   "

## 2020-05-13 ENCOUNTER — TELEPHONE (OUTPATIENT)
Dept: CARDIOLOGY | Facility: CLINIC | Age: 80
End: 2020-05-13

## 2020-05-13 NOTE — TELEPHONE ENCOUNTER
YVETTE [facility] Staff             PHYSICIAN NAME: DR KIRKLAND     PATIENT BEING SEEN FOR: HEART FAILURE     SIGNIFICANT EVENT/PROBLEM: PATIENT HAS BEEN HAVING GRADUAL WEIGHT INCREASE. WEIGHED 191# TODAY. HE # ON 4/14/20.       REQUEST/COMMENT: PLEASE ADVISE OF ANY CHANGES NECCESSARY TO PLAN OF CARE.     Thanks in advance,   Lv Stewart LPN   Patient Care Manager Assistant   [facility]   Phone: [phone]; Fax: [phone]      After receiving the above email from [facility] I called to check on pt. His nephew states pt is Eating a lot and not doing much activity so may be gaining some weight  Pt denies any swelling or sob.   No PND or orthopnea per nephew Live.   I reminded him to call If any of the following:  Watch for these Signs and Symptoms  If any of these occur, contact your doctor immediately:   Increase in shortness of breath with movement   Increase in swelling in your legs and ankles   Weight gain of more than 3 to 5 pounds in a week   Difficulty breathing when you are lying down   Worsening fatigue or tiredness   Stomach bloating, a full feeling or a loss of appetite   Increased coughing--especially when you are lying down

## 2020-05-18 ENCOUNTER — EXTERNAL HOME HEALTH (OUTPATIENT)
Dept: HOME HEALTH SERVICES | Facility: HOSPITAL | Age: 80
End: 2020-05-18
Payer: MEDICARE

## 2020-05-19 ENCOUNTER — TELEPHONE (OUTPATIENT)
Dept: CARDIOLOGY | Facility: CLINIC | Age: 80
End: 2020-05-19

## 2020-05-19 ENCOUNTER — PATIENT OUTREACH (OUTPATIENT)
Dept: ADMINISTRATIVE | Facility: OTHER | Age: 80
End: 2020-05-19

## 2020-05-19 DIAGNOSIS — I25.5 ISCHEMIC CARDIOMYOPATHY: ICD-10-CM

## 2020-05-19 DIAGNOSIS — I50.9 CHF (NYHA CLASS III, ACC/AHA STAGE C): ICD-10-CM

## 2020-05-19 DIAGNOSIS — Z95.810 ICD (IMPLANTABLE CARDIOVERTER-DEFIBRILLATOR) IN PLACE: Primary | ICD-10-CM

## 2020-05-19 NOTE — TELEPHONE ENCOUNTER
The home health nurse called because the patient has an irregular hr. His blood press 100/58  50, but she said there is a 2 second pause between each beat. The patient did not report shortness of breath, chest pain, or edema. He has mentioned having some weakness. I also confirmed all of his medication with the  nurse.

## 2020-05-20 ENCOUNTER — OFFICE VISIT (OUTPATIENT)
Dept: CARDIOLOGY | Facility: CLINIC | Age: 80
End: 2020-05-20
Payer: MEDICARE

## 2020-05-20 ENCOUNTER — HOSPITAL ENCOUNTER (OUTPATIENT)
Dept: CARDIOLOGY | Facility: HOSPITAL | Age: 80
Discharge: HOME | End: 2020-05-20
Attending: NUCLEAR MEDICINE
Payer: MEDICARE

## 2020-05-20 VITALS
HEART RATE: 67 BPM | WEIGHT: 193.31 LBS | OXYGEN SATURATION: 97 % | DIASTOLIC BLOOD PRESSURE: 72 MMHG | HEIGHT: 69 IN | BODY MASS INDEX: 28.63 KG/M2 | SYSTOLIC BLOOD PRESSURE: 116 MMHG

## 2020-05-20 DIAGNOSIS — I50.9 CHF (NYHA CLASS III, ACC/AHA STAGE C): Primary | Chronic | ICD-10-CM

## 2020-05-20 DIAGNOSIS — I25.5 ISCHEMIC CARDIOMYOPATHY: ICD-10-CM

## 2020-05-20 DIAGNOSIS — I49.3 FREQUENT PVCS: Chronic | ICD-10-CM

## 2020-05-20 DIAGNOSIS — I50.9 CHF (NYHA CLASS III, ACC/AHA STAGE C): ICD-10-CM

## 2020-05-20 DIAGNOSIS — Z95.810 AUTOMATIC IMPLANTABLE CARDIOVERTER-DEFIBRILLATOR IN SITU: ICD-10-CM

## 2020-05-20 DIAGNOSIS — I42.8 NICM (NONISCHEMIC CARDIOMYOPATHY): ICD-10-CM

## 2020-05-20 DIAGNOSIS — Z95.810 ICD (IMPLANTABLE CARDIOVERTER-DEFIBRILLATOR) IN PLACE: ICD-10-CM

## 2020-05-20 PROCEDURE — 99214 OFFICE O/P EST MOD 30 MIN: CPT | Mod: HCNC,S$GLB,, | Performed by: NUCLEAR MEDICINE

## 2020-05-20 PROCEDURE — 3078F PR MOST RECENT DIASTOLIC BLOOD PRESSURE < 80 MM HG: ICD-10-PCS | Mod: HCNC,CPTII,S$GLB, | Performed by: NUCLEAR MEDICINE

## 2020-05-20 PROCEDURE — 99499 RISK ADDL DX/OHS AUDIT: ICD-10-PCS | Mod: HCNC,S$GLB,, | Performed by: NUCLEAR MEDICINE

## 2020-05-20 PROCEDURE — 93283 CARDIAC DEVICE CHECK - IN CLINIC & HOSPITAL: ICD-10-PCS | Mod: 26,HCNC,, | Performed by: INTERNAL MEDICINE

## 2020-05-20 PROCEDURE — 1159F PR MEDICATION LIST DOCUMENTED IN MEDICAL RECORD: ICD-10-PCS | Mod: HCNC,S$GLB,, | Performed by: NUCLEAR MEDICINE

## 2020-05-20 PROCEDURE — 1159F MED LIST DOCD IN RCRD: CPT | Mod: HCNC,S$GLB,, | Performed by: NUCLEAR MEDICINE

## 2020-05-20 PROCEDURE — 99499 UNLISTED E&M SERVICE: CPT | Mod: HCNC,S$GLB,, | Performed by: NUCLEAR MEDICINE

## 2020-05-20 PROCEDURE — 99999 PR PBB SHADOW E&M-EST. PATIENT-LVL III: ICD-10-PCS | Mod: PBBFAC,HCNC,, | Performed by: NUCLEAR MEDICINE

## 2020-05-20 PROCEDURE — 93283 PRGRMG EVAL IMPLANTABLE DFB: CPT | Mod: 26,HCNC,, | Performed by: INTERNAL MEDICINE

## 2020-05-20 PROCEDURE — 93283 PRGRMG EVAL IMPLANTABLE DFB: CPT | Mod: HCNC

## 2020-05-20 PROCEDURE — 99214 PR OFFICE/OUTPT VISIT, EST, LEVL IV, 30-39 MIN: ICD-10-PCS | Mod: HCNC,S$GLB,, | Performed by: NUCLEAR MEDICINE

## 2020-05-20 PROCEDURE — 3074F PR MOST RECENT SYSTOLIC BLOOD PRESSURE < 130 MM HG: ICD-10-PCS | Mod: HCNC,CPTII,S$GLB, | Performed by: NUCLEAR MEDICINE

## 2020-05-20 PROCEDURE — 3074F SYST BP LT 130 MM HG: CPT | Mod: HCNC,CPTII,S$GLB, | Performed by: NUCLEAR MEDICINE

## 2020-05-20 PROCEDURE — 99999 PR PBB SHADOW E&M-EST. PATIENT-LVL III: CPT | Mod: PBBFAC,HCNC,, | Performed by: NUCLEAR MEDICINE

## 2020-05-20 PROCEDURE — 3078F DIAST BP <80 MM HG: CPT | Mod: HCNC,CPTII,S$GLB, | Performed by: NUCLEAR MEDICINE

## 2020-05-20 NOTE — PROGRESS NOTES
Subjective:   Patient ID:  Curtis Landry is a 79 y.o. male who presents for follow-up of Congestive Heart Failure (HFrEF, C, ) and Irregular Heart Beat (PVCS)      HPI RECENT HOME HEALTH CHECK UP- YESTERDAY- REPORTED SLOW HR WITH PAUSES.  PT WAS ESSENTIALLY ASYMPTOMATIC . THEREFORE HE CAME TODAY  FOR PACE/ ICD ANALYSIS  AND CARD CHECK UP  PATIENT DENIES ANY EXACERBATION OF PALPITATIONS.  NO INCREASE WEAKNESS. NO INCREASE FATIGUE..  NO NEAR SYNCOPE OR SYNCOPE  NO ORTHOPNEA OR PND  NO ANGINA OR EQUIVALENT  NO EDEMA. NO CALVE TENDERNESS  PACE ANALYSIS- LAST 6 MONTHS.  FREQUENT PVCS, RARE NSVT.  NO ICD DISCHARGE.    Review of Systems   Constitution: Negative for chills, fever, night sweats, weight gain and weight loss.   HENT: Negative for nosebleeds.    Eyes: Negative for blurred vision, double vision and visual disturbance.   Cardiovascular: Positive for palpitations. Negative for chest pain, dyspnea on exertion, irregular heartbeat, leg swelling, orthopnea, paroxysmal nocturnal dyspnea and syncope.   Respiratory: Negative for cough, hemoptysis and wheezing.    Endocrine: Negative for polydipsia and polyuria.   Hematologic/Lymphatic: Does not bruise/bleed easily.   Skin: Negative for rash.   Musculoskeletal: Negative for joint pain, joint swelling, muscle weakness and myalgias.   Gastrointestinal: Negative for abdominal pain, hematemesis, jaundice and melena.   Genitourinary: Negative for dysuria, hematuria and nocturia.   Neurological: Negative for dizziness, focal weakness, headaches, sensory change and weakness.   Psychiatric/Behavioral: Negative for depression. The patient does not have insomnia and is not nervous/anxious.      Family History   Problem Relation Age of Onset    Cancer Mother         pancreas    Cancer Father      Past Medical History:   Diagnosis Date    Anemia     Angina pectoris     Arthritis     CHF (congestive heart failure)     Glaucoma (increased eye pressure)     Followed by outside  opthalmology    HTN (hypertension)     Hyperlipidemia     Macular degeneration     Pneumonia     did not require hospitalization    Prostate cancer     Urinary incontinence      Social History     Socioeconomic History    Marital status:      Spouse name: Not on file    Number of children: Not on file    Years of education: Not on file    Highest education level: Not on file   Occupational History    Not on file   Social Needs    Financial resource strain: Not on file    Food insecurity:     Worry: Not on file     Inability: Not on file    Transportation needs:     Medical: Not on file     Non-medical: Not on file   Tobacco Use    Smoking status: Former Smoker     Years: 20.00     Last attempt to quit: 1980     Years since quittin.4    Smokeless tobacco: Never Used   Substance and Sexual Activity    Alcohol use: No    Drug use: No    Sexual activity: Yes     Partners: Female   Lifestyle    Physical activity:     Days per week: Not on file     Minutes per session: Not on file    Stress: Not on file   Relationships    Social connections:     Talks on phone: Not on file     Gets together: Not on file     Attends Roman [employer] service: Not on file     Active member of club or organization: Not on file     Attends meetings of clubs or organizations: Not on file     Relationship status: Not on file   Other Topics Concern    Not on file   Social History Narrative    Not on file     Current Outpatient Medications on File Prior to Visit   Medication Sig Dispense Refill    acetaminophen (TYLENOL) 650 MG TbSR Take 1 tablet (650 mg total) by mouth every 8 (eight) hours.  0    aspirin (ECOTRIN) 81 MG EC tablet Take by mouth. 1 Tablet, Delayed Release (E.C.) Oral Every day      carvedilol (COREG) 12.5 MG tablet Take 1 tablet (12.5 mg total) by mouth 2 (two) times daily. 60 tablet 11    cyanocobalamin (VITAMIN B-12) 1000 MCG tablet Take by mouth. 1 Tablet Oral Every day       dorzolamide-timolol 2-0.5% (COSOPT) 22.3-6.8 mg/mL ophthalmic solution Place 1 drop into both eyes 2 (two) times daily.  4    MULTIVITS,TH W-FE,OTHER MIN (COMPLETE MULTIVITAMIN ORAL) Take 1 tablet by mouth once daily.      travoprost (TRAVATAN Z) 0.004 % Drop Inject into the eye. 1 Drops Ophthalmic At bedtime      [DISCONTINUED] nitroGLYCERIN (NITROSTAT) 0.4 MG SL tablet Place 1 tablet (0.4 mg total) under the tongue every 5 (five) minutes as needed for Chest pain. 50 tablet 3     No current facility-administered medications on file prior to visit.      Review of patient's allergies indicates:   Allergen Reactions    No known drug allergies        Objective:     Physical Exam   Constitutional: He is oriented to person, place, and time. He appears well-developed. No distress.   HENT:   Head: Normocephalic.   Eyes: Pupils are equal, round, and reactive to light. Conjunctivae are normal.   Neck: Neck supple. No JVD present. No thyromegaly present.   Cardiovascular: Normal rate, normal heart sounds and intact distal pulses. A regularly irregular rhythm present. Frequent extrasystoles are present. Exam reveals no gallop and no friction rub.   No murmur heard.  Pulses:       Carotid pulses are 2+ on the right side, and 2+ on the left side.       Radial pulses are 2+ on the right side, and 2+ on the left side.        Femoral pulses are 2+ on the right side, and 2+ on the left side.       Popliteal pulses are 2+ on the right side, and 2+ on the left side.        Dorsalis pedis pulses are 2+ on the right side, and 2+ on the left side.        Posterior tibial pulses are 2+ on the right side, and 2+ on the left side.   Pulmonary/Chest: Breath sounds normal. He has no wheezes. He has no rales. He exhibits no tenderness.   Abdominal: Soft. Bowel sounds are normal. He exhibits no mass. There is no hepatosplenomegaly. There is no tenderness.   Musculoskeletal: He exhibits no edema or tenderness.        Cervical back: Normal.         Thoracic back: Normal.        Lumbar back: Normal.   Lymphadenopathy:     He has no cervical adenopathy.     He has no axillary adenopathy.        Right: No supraclavicular adenopathy present.        Left: No supraclavicular adenopathy present.   Neurological: He is alert and oriented to person, place, and time. He has normal strength. No sensory deficit. Gait normal.   Skin: Skin is warm. No cyanosis. No pallor. Nails show no clubbing.   Psychiatric: He has a normal mood and affect. His speech is normal and behavior is normal. Cognition and memory are normal.       Assessment:     1. CHF (NYHA class III, ACC/AHA stage C)    2. NICM (nonischemic cardiomyopathy)    3. Automatic implantable cardioverter-defibrillator in situ    4. Frequent PVCs        Plan:     CHF (NYHA class III, ACC/AHA stage C)  CLINICALLY WELL COMPENSATED    NICM (nonischemic cardiomyopathy)  NO CARDIO EMBOLISM    Automatic implantable cardioverter-defibrillator in situ  WELL FUNCTIONING CARD DEVICE  NO ICD DISCHARGE    Frequent PVCs  PLAN- SCHEDULE ECHO, TO EVALUATE LV FUNCTION AND LV EF- IN ORDER TO RECOMMEND [facility]  RX.

## 2020-05-22 LAB
AV DELAY - LONGEST: 300 MSEC
AV DELAY - SHORTEST: 190 MSEC
CHARGE TIME (SEC): 10.8 SEC
HV IMPEDANCE (OHM): 65 OHM
IMPEDANCE RA LEAD (NATIVE): 764 OHMS
IMPEDANCE RA LEAD: 777 OHMS
OHS CV DC PP MS1: 0.5 MS
OHS CV DC PP MS2: 0.5 MS
OHS CV DC PP V1: 2 V
OHS CV DC PP V2: 2 V
P/R-WAVE RA LEAD (NATIVE): 11 MV
P/R-WAVE RA LEAD: 1.5 MV
THRESHOLD MS RA LEAD (NATIVE): 0.5 MS
THRESHOLD MS RA LEAD: 0.5 MS
THRESHOLD V RA LEAD (NATIVE): 0.6 V
THRESHOLD V RA LEAD: 0.7 V

## 2020-06-03 ENCOUNTER — HOSPITAL ENCOUNTER (OUTPATIENT)
Dept: CARDIOLOGY | Facility: HOSPITAL | Age: 80
Discharge: HOME | End: 2020-06-03
Attending: NUCLEAR MEDICINE
Payer: MEDICARE

## 2020-06-03 VITALS
HEART RATE: 61 BPM | SYSTOLIC BLOOD PRESSURE: 116 MMHG | BODY MASS INDEX: 29.03 KG/M2 | WEIGHT: 196 LBS | HEIGHT: 69 IN | DIASTOLIC BLOOD PRESSURE: 72 MMHG

## 2020-06-03 DIAGNOSIS — I50.9 CHF (NYHA CLASS III, ACC/AHA STAGE C): Primary | Chronic | ICD-10-CM

## 2020-06-03 DIAGNOSIS — I49.3 FREQUENT PVCS: ICD-10-CM

## 2020-06-03 DIAGNOSIS — I50.9 CHF (NYHA CLASS III, ACC/AHA STAGE C): ICD-10-CM

## 2020-06-03 PROCEDURE — 93306 TTE W/DOPPLER COMPLETE: CPT | Mod: HCNC

## 2020-06-03 PROCEDURE — 93306 ECHO (CUPID ONLY): ICD-10-PCS | Mod: 26,HCNC,, | Performed by: INTERNAL MEDICINE

## 2020-06-03 PROCEDURE — 93306 TTE W/DOPPLER COMPLETE: CPT | Mod: 26,HCNC,, | Performed by: INTERNAL MEDICINE

## 2020-06-04 LAB
AORTIC ROOT ANNULUS: 2.9 CM
ASCENDING AORTA: 2.97 CM
AV INDEX (PROSTH): 0.63
AV MEAN GRADIENT: 3 MMHG
AV PEAK GRADIENT: 5 MMHG
AV VALVE AREA: 2.41 CM2
AV VELOCITY RATIO: 0.7
BSA FOR ECHO PROCEDURE: 2.08 M2
CV ECHO LV RWT: 0.72 CM
DOP CALC AO PEAK VEL: 1.15 M/S
DOP CALC AO VTI: 23.11 CM
DOP CALC LVOT AREA: 3.8 CM2
DOP CALC LVOT DIAMETER: 2.2 CM
DOP CALC LVOT PEAK VEL: 0.8 M/S
DOP CALC LVOT STROKE VOLUME: 55.62 CM3
DOP CALCLVOT PEAK VEL VTI: 14.64 CM
E WAVE DECELERATION TIME: 323.62 MSEC
E/A RATIO: 0.49
E/E' RATIO: 8.18 M/S
ECHO LV POSTERIOR WALL: 1.49 CM (ref 0.6–1.1)
FRACTIONAL SHORTENING: 11 % (ref 28–44)
INTERVENTRICULAR SEPTUM: 1.43 CM (ref 0.6–1.1)
IVRT: 159.85 MSEC
LA MAJOR: 4.65 CM
LA MINOR: 4.71 CM
LA WIDTH: 3.26 CM
LEFT ATRIUM SIZE: 3.07 CM
LEFT ATRIUM VOLUME INDEX: 19.4 ML/M2
LEFT ATRIUM VOLUME: 39.81 CM3
LEFT INTERNAL DIMENSION IN SYSTOLE: 3.67 CM (ref 2.1–4)
LEFT VENTRICLE DIASTOLIC VOLUME INDEX: 36.84 ML/M2
LEFT VENTRICLE DIASTOLIC VOLUME: 75.44 ML
LEFT VENTRICLE MASS INDEX: 114 G/M2
LEFT VENTRICLE SYSTOLIC VOLUME INDEX: 27.8 ML/M2
LEFT VENTRICLE SYSTOLIC VOLUME: 57 ML
LEFT VENTRICULAR INTERNAL DIMENSION IN DIASTOLE: 4.13 CM (ref 3.5–6)
LEFT VENTRICULAR MASS: 233.52 G
LV LATERAL E/E' RATIO: 9 M/S
LV SEPTAL E/E' RATIO: 7.5 M/S
MV PEAK A VEL: 0.91 M/S
MV PEAK E VEL: 0.45 M/S
PISA TR MAX VEL: 2.15 M/S
PULM VEIN S/D RATIO: 1.1
PV PEAK D VEL: 0.3 M/S
PV PEAK S VEL: 0.33 M/S
PV PEAK VELOCITY: 0.81 CM/S
RA MAJOR: 3.59 CM
RA WIDTH: 2.74 CM
RIGHT VENTRICULAR END-DIASTOLIC DIMENSION: 2.65 CM
SINUS: 3.19 CM
STJ: 3.48 CM
TDI LATERAL: 0.05 M/S
TDI SEPTAL: 0.06 M/S
TDI: 0.06 M/S
TR MAX PG: 18 MMHG
TRICUSPID ANNULAR PLANE SYSTOLIC EXCURSION: 1.95 CM

## 2020-06-13 PROCEDURE — G0179 PR HOME HEALTH MD RECERTIFICATION: ICD-10-PCS | Mod: ,,, | Performed by: FAMILY MEDICINE

## 2020-06-13 PROCEDURE — G0179 MD RECERTIFICATION HHA PT: HCPCS | Mod: ,,, | Performed by: FAMILY MEDICINE

## 2020-06-18 ENCOUNTER — DOCUMENT SCAN (OUTPATIENT)
Dept: HOME HEALTH SERVICES | Facility: HOSPITAL | Age: 80
End: 2020-06-18
Payer: MEDICARE

## 2020-06-26 ENCOUNTER — EXTERNAL HOME HEALTH (OUTPATIENT)
Dept: HOME HEALTH SERVICES | Facility: HOSPITAL | Age: 80
End: 2020-06-26
Payer: MEDICARE

## 2020-06-30 ENCOUNTER — EXTERNAL HOME HEALTH (OUTPATIENT)
Dept: HOME HEALTH SERVICES | Facility: HOSPITAL | Age: 80
End: 2020-06-30
Payer: MEDICARE

## 2020-07-17 ENCOUNTER — EXTERNAL HOME HEALTH (OUTPATIENT)
Dept: HOME HEALTH SERVICES | Facility: HOSPITAL | Age: 80
End: 2020-07-17

## 2020-07-17 NOTE — PROGRESS NOTES
60 Day Recert Order # [id number]  New Cert Period: 06/13/2020 to 08/11/2020 with [facility] Pineda - Dr. Liz Hall

## 2020-08-05 ENCOUNTER — OFFICE VISIT (OUTPATIENT)
Dept: INTERNAL MEDICINE | Facility: CLINIC | Age: 80
End: 2020-08-05
Payer: MEDICARE

## 2020-08-05 VITALS
SYSTOLIC BLOOD PRESSURE: 128 MMHG | HEIGHT: 66 IN | DIASTOLIC BLOOD PRESSURE: 72 MMHG | BODY MASS INDEX: 32.03 KG/M2 | WEIGHT: 199.31 LBS | HEART RATE: 70 BPM | OXYGEN SATURATION: 98 %

## 2020-08-05 DIAGNOSIS — D64.9 ANEMIA, UNSPECIFIED TYPE: ICD-10-CM

## 2020-08-05 DIAGNOSIS — Z74.09 OTHER REDUCED MOBILITY: ICD-10-CM

## 2020-08-05 DIAGNOSIS — Z95.810 AUTOMATIC IMPLANTABLE CARDIOVERTER-DEFIBRILLATOR IN SITU: ICD-10-CM

## 2020-08-05 DIAGNOSIS — Z91.89 POTENTIAL FOR COGNITIVE IMPAIRMENT: ICD-10-CM

## 2020-08-05 DIAGNOSIS — I50.9 CHF (NYHA CLASS III, ACC/AHA STAGE C): Chronic | ICD-10-CM

## 2020-08-05 DIAGNOSIS — Z00.00 ENCOUNTER FOR PREVENTIVE HEALTH EXAMINATION: Primary | ICD-10-CM

## 2020-08-05 DIAGNOSIS — E78.5 HYPERLIPIDEMIA, UNSPECIFIED HYPERLIPIDEMIA TYPE: ICD-10-CM

## 2020-08-05 DIAGNOSIS — I49.3 FREQUENT PVCS: Chronic | ICD-10-CM

## 2020-08-05 DIAGNOSIS — H91.90 HEARING DIFFICULTY, UNSPECIFIED LATERALITY: ICD-10-CM

## 2020-08-05 DIAGNOSIS — I10 ESSENTIAL HYPERTENSION: ICD-10-CM

## 2020-08-05 DIAGNOSIS — I42.8 NICM (NONISCHEMIC CARDIOMYOPATHY): ICD-10-CM

## 2020-08-05 DIAGNOSIS — Z99.89 DEPENDENCE ON OTHER ENABLING MACHINES AND DEVICES: ICD-10-CM

## 2020-08-05 DIAGNOSIS — D47.2 MGUS (MONOCLONAL GAMMOPATHY OF UNKNOWN SIGNIFICANCE): ICD-10-CM

## 2020-08-05 DIAGNOSIS — Z85.46 HISTORY OF PROSTATE CANCER: ICD-10-CM

## 2020-08-05 DIAGNOSIS — Z91.81 AT RISK FOR FALLS: ICD-10-CM

## 2020-08-05 DIAGNOSIS — R94.120 ABNORMAL HEARING SCREEN: ICD-10-CM

## 2020-08-05 DIAGNOSIS — E66.9 OBESITY (BMI 30.0-34.9): ICD-10-CM

## 2020-08-05 PROBLEM — E66.811 OBESITY (BMI 30.0-34.9): Status: ACTIVE | Noted: 2020-08-05

## 2020-08-05 PROBLEM — Z87.448 HISTORY OF ACUTE RENAL FAILURE: Status: ACTIVE | Noted: 2018-06-13

## 2020-08-05 PROCEDURE — 99999 PR PBB SHADOW E&M-EST. PATIENT-LVL IV: ICD-10-PCS | Mod: PBBFAC,HCNC,, | Performed by: NURSE PRACTITIONER

## 2020-08-05 PROCEDURE — 3074F PR MOST RECENT SYSTOLIC BLOOD PRESSURE < 130 MM HG: ICD-10-PCS | Mod: HCNC,CPTII,S$GLB, | Performed by: NURSE PRACTITIONER

## 2020-08-05 PROCEDURE — 99499 UNLISTED E&M SERVICE: CPT | Mod: HCNC,S$GLB,, | Performed by: NURSE PRACTITIONER

## 2020-08-05 PROCEDURE — 99499 RISK ADDL DX/OHS AUDIT: ICD-10-PCS | Mod: HCNC,S$GLB,, | Performed by: NURSE PRACTITIONER

## 2020-08-05 PROCEDURE — 3074F SYST BP LT 130 MM HG: CPT | Mod: HCNC,CPTII,S$GLB, | Performed by: NURSE PRACTITIONER

## 2020-08-05 PROCEDURE — 99999 PR PBB SHADOW E&M-EST. PATIENT-LVL IV: CPT | Mod: PBBFAC,HCNC,, | Performed by: NURSE PRACTITIONER

## 2020-08-05 PROCEDURE — 3078F PR MOST RECENT DIASTOLIC BLOOD PRESSURE < 80 MM HG: ICD-10-PCS | Mod: HCNC,CPTII,S$GLB, | Performed by: NURSE PRACTITIONER

## 2020-08-05 PROCEDURE — G0439 PPPS, SUBSEQ VISIT: HCPCS | Mod: HCNC,S$GLB,, | Performed by: NURSE PRACTITIONER

## 2020-08-05 PROCEDURE — G0439 PR MEDICARE ANNUAL WELLNESS SUBSEQUENT VISIT: ICD-10-PCS | Mod: HCNC,S$GLB,, | Performed by: NURSE PRACTITIONER

## 2020-08-05 PROCEDURE — 3078F DIAST BP <80 MM HG: CPT | Mod: HCNC,CPTII,S$GLB, | Performed by: NURSE PRACTITIONER

## 2020-08-05 NOTE — PROGRESS NOTES
"  Curtis Landry presented for a  Medicare AWV and comprehensive Health Risk Assessment today. The following components were reviewed and updated:    · Medical history  · Family History  · Social history  · Allergies and Current Medications  · Health Risk Assessment  · Health Maintenance  · Care Team     ** See Completed Assessments for Annual Wellness Visit within the encounter summary.**         The following assessments were completed:  · Living Situation  · CAGE  · Depression Screening  · Timed Get Up and Go  · Whisper Test  · Cognitive Function Screening  · Nutrition Screening  · ADL Screening  · PAQ Screening        Vitals:    08/05/20 1314   BP: 128/72   BP Location: Right arm   Patient Position: Sitting   BP Method: Medium (Manual)   Pulse: 70   SpO2: 98%   Weight: 90.4 kg (199 lb 4.7 oz)   Height: 5' 6.14" (1.68 m)     Body mass index is 32.03 kg/m².  Physical Exam  Vitals signs and nursing note reviewed.   Constitutional:       Appearance: He is well-developed.      Comments: Patient accompanied by family member, who was present throughout the visit and aided in information gathering.      HENT:      Head: Normocephalic.   Cardiovascular:      Rate and Rhythm: Normal rate and regular rhythm.      Heart sounds: Normal heart sounds. No murmur.   Pulmonary:      Effort: Pulmonary effort is normal. No respiratory distress.      Breath sounds: Normal breath sounds.   Abdominal:      Palpations: Abdomen is soft. There is no mass.      Tenderness: There is no abdominal tenderness.   Musculoskeletal: Normal range of motion.   Skin:     General: Skin is warm and dry.   Neurological:      Mental Status: He is alert and oriented to person, place, and time.      Motor: No abnormal muscle tone.   Psychiatric:         Speech: Speech normal.         Behavior: Behavior normal.               Diagnoses and health risks identified today and associated recommendations/orders:    1. Encounter for preventive health " examination  Advised to bring medications to next appointment so that chart can be updated appropriately. Patient verbalized understanding.      MA to schedule  PCP    Patient will receive Shingrix and Tetanus vaccine at pharmacy.      They will discuss scheduling follow up visits with hematology and nephrology with PCP at upcoming appointment. They will also discuss Hep C screening, lipid panel, and PSA with PCP, in event, other blood work is needed.     2. Essential hypertension  Stable and controlled. Continue current treatment plan as previously prescribed with your PCP.     3. Hyperlipidemia, unspecified hyperlipidemia type  Advised to follow up with PCP for further evaluation and recommendations. They expressed understanding.      4. CHF (NYHA class III, ACC/AHA stage C)  Discussed s/s of heart failure (patient denies any s/s) and advised to go to the ER if occur. They expressed understanding.   Stable and controlled. Continue current treatment plan as previously prescribed with your cardiologist.     5. NICM (nonischemic cardiomyopathy)  See #4    6. Automatic implantable cardioverter-defibrillator in situ  See #4    7. Frequent PVCs  See #4    8. MGUS (monoclonal gammopathy of unknown significance)  Continue current treatment plan as previously prescribed with your hematologist.    9. Anemia, unspecified type  Continue current treatment plan as previously prescribed with your PCP and hematologist.     10. Obesity (BMI 30.0-34.9)  Encouraged healthy diet and exercise as tolerated to help bring BMI into normal range.   Continue current treatment plan as previously prescribed with your PCP.     11. Potential for cognitive impairment  Abnormal cognitive function screening.   Lives alone; has family to help out when needed.   Denies memory difficulties, family member also denies patient having problems with memory.   Advised to follow up with PCP for further evaluation and recommendations. They expressed  understanding.      12. At risk for falls  Abnormal timed get up and go test. Denies any falls in the last 12 months. Uses a cane to aid with ambulation.   Fall precautions reviewed with patient. Advised to follow up with PCP for further recommendations. They expressed understanding.       13. Other reduced mobility  See #12    14. Dependence on other enabling machines and devices  See #12    15. Abnormal hearing screen  Reports difficulty hearing.   Abnormal whisper test.   Advised to follow up with PCP for further evaluation and recommendations. They  expressed understanding.     - Ambulatory referral/consult to Audiology; Future    16. Hearing difficulty, unspecified laterality  See #15  - Ambulatory referral/consult to Audiology; Future    17. History of prostate cancer  Advised to follow up with PCP for further evaluation and recommendations. They expressed understanding.        Provided Curtis with a 5-10 year written screening schedule and personal prevention plan. Recommendations were developed using the USPSTF age appropriate recommendations. Education, counseling, and referrals were provided as needed. After Visit Summary printed and given to patient which includes a list of additional screenings\tests needed.    Follow up in about 1 year (around 8/5/2021) for awv.    Yudith Ordonez NP  I offered to discuss end of life issues, including information on how to make advance directives that the patient could use to name someone who would make medical decisions on their behalf if they became too ill to make themselves.    ___Patient declined  _X_Patient is interested, I provided paper work and offered to discuss.

## 2020-08-05 NOTE — Clinical Note
Your patient was seen today for AWV. Abnormalities bolded. Please review.   Thank you,   CONY Garcia

## 2020-08-05 NOTE — PATIENT INSTRUCTIONS
Counseling and Referral of Other Preventative  (Italic type indicates deductible and co-insurance are waived)    Patient Name: Curtis Landry  Today's Date: 8/5/2020    Health Maintenance       Date Due Completion Date    Hepatitis C Screening 1940 ---    TETANUS VACCINE 11/17/1958 ---    Shingles Vaccine (2 of 3) 12/05/2014 10/10/2014    Lipid Panel 11/26/2019 11/26/2018    PROSTATE-SPECIFIC ANTIGEN 05/13/2020 5/13/2019    Influenza Vaccine (1) 09/01/2020 10/3/2019    Aspirin/Antiplatelet Therapy 08/05/2021 8/5/2020        Orders Placed This Encounter   Procedures    Ambulatory referral/consult to Audiology     The following information is provided to all patients.  This information is to help you find resources for any of the problems found today that may be affecting your health:                Living healthy guide: www.[facility].[place].[facility]      Understanding Diabetes: www.diabetes.org      Eating healthy: www.cdc.gov/healthyweight      CDC home safety checklist: www.cdc.gov/steadi/patient.html      Agency on Aging: www.goea.[place].[facility]      Alcoholics anonymous (AA): www.aa.org      Physical Activity: www.rachelle.nih.gov/ne0dmqt      Tobacco use: www.quitwithusla.org

## 2020-09-17 ENCOUNTER — HOSPITAL ENCOUNTER (OUTPATIENT)
Dept: CARDIOLOGY | Facility: HOSPITAL | Age: 80
Discharge: HOME | End: 2020-09-17
Attending: NUCLEAR MEDICINE
Payer: MEDICARE

## 2020-09-17 ENCOUNTER — TELEPHONE (OUTPATIENT)
Dept: UROLOGY | Facility: CLINIC | Age: 80
End: 2020-09-17

## 2020-09-17 DIAGNOSIS — I25.5 ISCHEMIC CARDIOMYOPATHY: ICD-10-CM

## 2020-09-17 DIAGNOSIS — I50.9 CHF (NYHA CLASS III, ACC/AHA STAGE C): ICD-10-CM

## 2020-09-17 DIAGNOSIS — Z95.810 ICD (IMPLANTABLE CARDIOVERTER-DEFIBRILLATOR) IN PLACE: ICD-10-CM

## 2020-09-17 LAB
AV DELAY - LONGEST: 300 MSEC
AV DELAY - SHORTEST: 190 MSEC
CHARGE TIME (SEC): 10.9 SEC
HV IMPEDANCE (OHM): 63 OHM
IMPEDANCE RA LEAD (NATIVE): 727 OHMS
IMPEDANCE RA LEAD: 735 OHMS
OHS CV DC PP MS1: 0.5 MS
OHS CV DC PP MS2: 0.5 MS
OHS CV DC PP V1: 2 V
OHS CV DC PP V2: 2 V
P/R-WAVE RA LEAD (NATIVE): 13.6 MV
P/R-WAVE RA LEAD: 2.4 MV
THRESHOLD MS RA LEAD (NATIVE): 0.5 MS
THRESHOLD MS RA LEAD: 0.5 MS
THRESHOLD V RA LEAD (NATIVE): 0.7 V
THRESHOLD V RA LEAD: 0.8 V

## 2020-09-17 PROCEDURE — 93283 CARDIAC DEVICE CHECK - IN CLINIC & HOSPITAL: ICD-10-PCS | Mod: 26,HCNC,, | Performed by: INTERNAL MEDICINE

## 2020-09-17 PROCEDURE — 93283 PRGRMG EVAL IMPLANTABLE DFB: CPT | Mod: HCNC

## 2020-09-17 PROCEDURE — 93283 PRGRMG EVAL IMPLANTABLE DFB: CPT | Mod: 26,HCNC,, | Performed by: INTERNAL MEDICINE

## 2020-09-17 NOTE — TELEPHONE ENCOUNTER
----- Message from Meghann Oseguera sent at 9/17/2020  3:33 PM CDT -----  Contact: wife  Wants to know if the pt can be seen at 3pm on 09/21/2020, can be reached at [phone]///[email or web address]

## 2020-09-21 ENCOUNTER — OFFICE VISIT (OUTPATIENT)
Dept: INTERNAL MEDICINE | Facility: CLINIC | Age: 80
End: 2020-09-21
Payer: MEDICARE

## 2020-09-21 ENCOUNTER — LAB VISIT (OUTPATIENT)
Dept: LAB | Facility: HOSPITAL | Age: 80
End: 2020-09-21
Attending: UROLOGY
Payer: MEDICARE

## 2020-09-21 ENCOUNTER — CLINICAL SUPPORT (OUTPATIENT)
Dept: AUDIOLOGY | Facility: CLINIC | Age: 80
End: 2020-09-21
Payer: MEDICARE

## 2020-09-21 ENCOUNTER — OFFICE VISIT (OUTPATIENT)
Dept: UROLOGY | Facility: CLINIC | Age: 80
End: 2020-09-21
Payer: MEDICARE

## 2020-09-21 VITALS
OXYGEN SATURATION: 97 % | BODY MASS INDEX: 32.19 KG/M2 | DIASTOLIC BLOOD PRESSURE: 60 MMHG | HEART RATE: 60 BPM | WEIGHT: 200.31 LBS | SYSTOLIC BLOOD PRESSURE: 114 MMHG | HEIGHT: 66 IN | TEMPERATURE: 97 F

## 2020-09-21 VITALS
BODY MASS INDEX: 32.17 KG/M2 | WEIGHT: 199.31 LBS | SYSTOLIC BLOOD PRESSURE: 144 MMHG | DIASTOLIC BLOOD PRESSURE: 80 MMHG | TEMPERATURE: 97 F

## 2020-09-21 DIAGNOSIS — Z00.00 ROUTINE GENERAL MEDICAL EXAMINATION AT A HEALTH CARE FACILITY: Primary | ICD-10-CM

## 2020-09-21 DIAGNOSIS — E66.9 OBESITY (BMI 30.0-34.9): ICD-10-CM

## 2020-09-21 DIAGNOSIS — Z85.46 HISTORY OF PROSTATE CANCER: ICD-10-CM

## 2020-09-21 DIAGNOSIS — N30.00 ACUTE CYSTITIS WITHOUT HEMATURIA: ICD-10-CM

## 2020-09-21 DIAGNOSIS — H91.90 HEARING DIFFICULTY, UNSPECIFIED LATERALITY: ICD-10-CM

## 2020-09-21 DIAGNOSIS — H90.3 SENSORINEURAL HEARING LOSS, BILATERAL: Primary | ICD-10-CM

## 2020-09-21 DIAGNOSIS — C61 PROSTATE CANCER: ICD-10-CM

## 2020-09-21 DIAGNOSIS — H93.13 TINNITUS, BILATERAL: ICD-10-CM

## 2020-09-21 DIAGNOSIS — D63.8 ANEMIA IN CHRONIC ILLNESS: ICD-10-CM

## 2020-09-21 DIAGNOSIS — R94.120 ABNORMAL HEARING SCREEN: ICD-10-CM

## 2020-09-21 DIAGNOSIS — I10 ESSENTIAL HYPERTENSION: ICD-10-CM

## 2020-09-21 DIAGNOSIS — E78.5 HYPERLIPIDEMIA, UNSPECIFIED HYPERLIPIDEMIA TYPE: ICD-10-CM

## 2020-09-21 DIAGNOSIS — D47.2 MGUS (MONOCLONAL GAMMOPATHY OF UNKNOWN SIGNIFICANCE): ICD-10-CM

## 2020-09-21 DIAGNOSIS — C61 PROSTATE CANCER: Primary | ICD-10-CM

## 2020-09-21 DIAGNOSIS — Z11.59 NEED FOR HEPATITIS C SCREENING TEST: ICD-10-CM

## 2020-09-21 LAB
BACTERIA #/AREA URNS AUTO: ABNORMAL /HPF
BILIRUB SERPL-MCNC: NORMAL MG/DL
BLOOD URINE, POC: NORMAL
CLARITY, POC UA: NORMAL
COLOR, POC UA: YELLOW
GLUCOSE UR QL STRIP: NORMAL
HYALINE CASTS UR QL AUTO: 7 /LPF
KETONES UR QL STRIP: NORMAL
LEUKOCYTE ESTERASE URINE, POC: NORMAL
MICROSCOPIC COMMENT: ABNORMAL
NITRITE, POC UA: NORMAL
PH, POC UA: 6
POC RESIDUAL URINE VOLUME: 7 ML (ref 0–100)
PROTEIN, POC: NORMAL
RBC #/AREA URNS AUTO: 1 /HPF (ref 0–4)
SPECIFIC GRAVITY, POC UA: 1.01
SQUAMOUS #/AREA URNS AUTO: 1 /HPF
UROBILINOGEN, POC UA: NORMAL
WBC #/AREA URNS AUTO: 74 /HPF (ref 0–5)

## 2020-09-21 PROCEDURE — 90694 VACC AIIV4 NO PRSRV 0.5ML IM: CPT | Mod: HCNC,S$GLB,, | Performed by: FAMILY MEDICINE

## 2020-09-21 PROCEDURE — 92557 PR COMPREHENSIVE HEARING TEST: ICD-10-PCS | Mod: HCNC,S$GLB,, | Performed by: AUDIOLOGIST-HEARING AID FITTER

## 2020-09-21 PROCEDURE — 1159F MED LIST DOCD IN RCRD: CPT | Mod: HCNC,S$GLB,, | Performed by: UROLOGY

## 2020-09-21 PROCEDURE — 99999 PR PBB SHADOW E&M-EST. PATIENT-LVL I: CPT | Mod: PBBFAC,HCNC,, | Performed by: AUDIOLOGIST-HEARING AID FITTER

## 2020-09-21 PROCEDURE — 1101F PT FALLS ASSESS-DOCD LE1/YR: CPT | Mod: HCNC,CPTII,S$GLB, | Performed by: UROLOGY

## 2020-09-21 PROCEDURE — 3079F PR MOST RECENT DIASTOLIC BLOOD PRESSURE 80-89 MM HG: ICD-10-PCS | Mod: HCNC,CPTII,S$GLB, | Performed by: UROLOGY

## 2020-09-21 PROCEDURE — 99214 PR OFFICE/OUTPT VISIT, EST, LEVL IV, 30-39 MIN: ICD-10-PCS | Mod: HCNC,25,S$GLB, | Performed by: UROLOGY

## 2020-09-21 PROCEDURE — 3079F DIAST BP 80-89 MM HG: CPT | Mod: HCNC,CPTII,S$GLB, | Performed by: UROLOGY

## 2020-09-21 PROCEDURE — 84153 ASSAY OF PSA TOTAL: CPT | Mod: HCNC

## 2020-09-21 PROCEDURE — 92557 COMPREHENSIVE HEARING TEST: CPT | Mod: HCNC,S$GLB,, | Performed by: AUDIOLOGIST-HEARING AID FITTER

## 2020-09-21 PROCEDURE — 3078F DIAST BP <80 MM HG: CPT | Mod: HCNC,CPTII,S$GLB, | Performed by: FAMILY MEDICINE

## 2020-09-21 PROCEDURE — 99999 PR PBB SHADOW E&M-EST. PATIENT-LVL III: ICD-10-PCS | Mod: PBBFAC,HCNC,, | Performed by: FAMILY MEDICINE

## 2020-09-21 PROCEDURE — 3074F SYST BP LT 130 MM HG: CPT | Mod: HCNC,CPTII,S$GLB, | Performed by: FAMILY MEDICINE

## 2020-09-21 PROCEDURE — 92567 PR TYMPA2METRY: ICD-10-PCS | Mod: HCNC,S$GLB,, | Performed by: AUDIOLOGIST-HEARING AID FITTER

## 2020-09-21 PROCEDURE — 99214 OFFICE O/P EST MOD 30 MIN: CPT | Mod: HCNC,25,S$GLB, | Performed by: UROLOGY

## 2020-09-21 PROCEDURE — 81001 URINALYSIS AUTO W/SCOPE: CPT | Mod: HCNC

## 2020-09-21 PROCEDURE — 87088 URINE BACTERIA CULTURE: CPT | Mod: HCNC

## 2020-09-21 PROCEDURE — 99999 PR PBB SHADOW E&M-EST. PATIENT-LVL I: ICD-10-PCS | Mod: PBBFAC,HCNC,, | Performed by: AUDIOLOGIST-HEARING AID FITTER

## 2020-09-21 PROCEDURE — 1126F AMNT PAIN NOTED NONE PRSNT: CPT | Mod: HCNC,S$GLB,, | Performed by: UROLOGY

## 2020-09-21 PROCEDURE — 1101F PR PT FALLS ASSESS DOC 0-1 FALLS W/OUT INJ PAST YR: ICD-10-PCS | Mod: HCNC,CPTII,S$GLB, | Performed by: UROLOGY

## 2020-09-21 PROCEDURE — G0008 ADMIN INFLUENZA VIRUS VAC: HCPCS | Mod: HCNC,S$GLB,, | Performed by: FAMILY MEDICINE

## 2020-09-21 PROCEDURE — 99499 RISK ADDL DX/OHS AUDIT: ICD-10-PCS | Mod: HCNC,S$GLB,, | Performed by: UROLOGY

## 2020-09-21 PROCEDURE — 99999 PR PBB SHADOW E&M-EST. PATIENT-LVL III: ICD-10-PCS | Mod: PBBFAC,HCNC,, | Performed by: UROLOGY

## 2020-09-21 PROCEDURE — 36415 COLL VENOUS BLD VENIPUNCTURE: CPT | Mod: HCNC

## 2020-09-21 PROCEDURE — 1126F PR PAIN SEVERITY QUANTIFIED, NO PAIN PRESENT: ICD-10-PCS | Mod: HCNC,S$GLB,, | Performed by: UROLOGY

## 2020-09-21 PROCEDURE — 51798 POCT BLADDER SCAN: ICD-10-PCS | Mod: HCNC,S$GLB,, | Performed by: UROLOGY

## 2020-09-21 PROCEDURE — 81002 POCT URINE DIPSTICK WITHOUT MICROSCOPE: ICD-10-PCS | Mod: HCNC,S$GLB,, | Performed by: UROLOGY

## 2020-09-21 PROCEDURE — G0008 PR ADMIN INFLUENZA VIRUS VAC: ICD-10-PCS | Mod: HCNC,S$GLB,, | Performed by: FAMILY MEDICINE

## 2020-09-21 PROCEDURE — 51798 US URINE CAPACITY MEASURE: CPT | Mod: HCNC,S$GLB,, | Performed by: UROLOGY

## 2020-09-21 PROCEDURE — 81002 URINALYSIS NONAUTO W/O SCOPE: CPT | Mod: HCNC,S$GLB,, | Performed by: UROLOGY

## 2020-09-21 PROCEDURE — 87086 URINE CULTURE/COLONY COUNT: CPT | Mod: HCNC

## 2020-09-21 PROCEDURE — 99499 UNLISTED E&M SERVICE: CPT | Mod: HCNC,S$GLB,, | Performed by: UROLOGY

## 2020-09-21 PROCEDURE — 90694 FLU VACCINE - QUADRIVALENT - ADJUVANTED: ICD-10-PCS | Mod: HCNC,S$GLB,, | Performed by: FAMILY MEDICINE

## 2020-09-21 PROCEDURE — 3074F PR MOST RECENT SYSTOLIC BLOOD PRESSURE < 130 MM HG: ICD-10-PCS | Mod: HCNC,CPTII,S$GLB, | Performed by: UROLOGY

## 2020-09-21 PROCEDURE — 3074F PR MOST RECENT SYSTOLIC BLOOD PRESSURE < 130 MM HG: ICD-10-PCS | Mod: HCNC,CPTII,S$GLB, | Performed by: FAMILY MEDICINE

## 2020-09-21 PROCEDURE — 99397 PR PREVENTIVE VISIT,EST,65 & OVER: ICD-10-PCS | Mod: 25,HCNC,S$GLB, | Performed by: FAMILY MEDICINE

## 2020-09-21 PROCEDURE — 99999 PR PBB SHADOW E&M-EST. PATIENT-LVL III: CPT | Mod: PBBFAC,HCNC,, | Performed by: FAMILY MEDICINE

## 2020-09-21 PROCEDURE — 87186 SC STD MICRODIL/AGAR DIL: CPT | Mod: HCNC

## 2020-09-21 PROCEDURE — 3078F PR MOST RECENT DIASTOLIC BLOOD PRESSURE < 80 MM HG: ICD-10-PCS | Mod: HCNC,CPTII,S$GLB, | Performed by: FAMILY MEDICINE

## 2020-09-21 PROCEDURE — 3074F SYST BP LT 130 MM HG: CPT | Mod: HCNC,CPTII,S$GLB, | Performed by: UROLOGY

## 2020-09-21 PROCEDURE — 87077 CULTURE AEROBIC IDENTIFY: CPT | Mod: HCNC

## 2020-09-21 PROCEDURE — 99397 PER PM REEVAL EST PAT 65+ YR: CPT | Mod: 25,HCNC,S$GLB, | Performed by: FAMILY MEDICINE

## 2020-09-21 PROCEDURE — 92567 TYMPANOMETRY: CPT | Mod: HCNC,S$GLB,, | Performed by: AUDIOLOGIST-HEARING AID FITTER

## 2020-09-21 PROCEDURE — 99999 PR PBB SHADOW E&M-EST. PATIENT-LVL III: CPT | Mod: PBBFAC,HCNC,, | Performed by: UROLOGY

## 2020-09-21 PROCEDURE — 1159F PR MEDICATION LIST DOCUMENTED IN MEDICAL RECORD: ICD-10-PCS | Mod: HCNC,S$GLB,, | Performed by: UROLOGY

## 2020-09-21 RX ORDER — QUINIDINE GLUCONATE 324 MG
240 TABLET, EXTENDED RELEASE ORAL EVERY OTHER DAY
COMMUNITY

## 2020-09-21 RX ORDER — CIPROFLOXACIN 500 MG/1
500 TABLET ORAL 2 TIMES DAILY
Qty: 14 TABLET | Refills: 0 | Status: SHIPPED | OUTPATIENT
Start: 2020-09-21 | End: 2020-09-28

## 2020-09-21 NOTE — PROGRESS NOTES
Chief Complaint: Prostate Cancer    HPI:   9/21/20: Doing fine no sig complaints. PSA low last year.  Thinks his stream is weak.  Feels he empties all the way. Reviewed history in detail.    5/13/19: Not using the HARRISON anymore not helping.  Reviewed history in detail.  PSA not done yet.  5/7/18: No recent PSA, doing well.  Voiding fine no complaints.  Wants a HARRISON rx.  8/17/16: Doing well no interval changes.   8/14/15: 73 yo man was dx with prostate cancer about 5 years ago by Dr. Trevino with subsequent prostatectomy.  Recovered well but then had ED.  After a time he started using a HARRISON and it was fine.  Lately it has stopped working for him because the pump is broken.  He also finds his scrotum gets pulled into the device sometimes.  And that the rings need adjustment.  No gross hematuria.  Has had no prostate cancer followup in years.  No urinary bother except some occasional drips and he uses a pad for many days.       Allergies:  No known drug allergies    Medications:  has a current medication list which includes the following prescription(s): acetaminophen, aspirin, carvedilol, dorzolamide-timolol 2-0.5%, ferrous gluconate, and travoprost.    Review of Systems:  General: No fever, chills, fatigability, or weight loss.  Skin: No rashes, itching, or changes in color or texture of skin.  Chest: Denies CORBIN, cyanosis, wheezing, cough, and sputum production.  Abdomen: Appetite fine. No weight loss. Denies diarrhea, abdominal pain, hematemesis, or blood in stool.  Musculoskeletal: Some joint stiffness or swelling. Some back pain.  : As above.  All other review of systems negative.    PMH:   has a past medical history of Anemia, Angina pectoris, Arthritis, CHF (congestive heart failure), Glaucoma (increased eye pressure), HTN (hypertension), Hyperlipidemia, Macular degeneration, Pneumonia, Prostate cancer, Prostate cancer, and Urinary incontinence.    PSH:   has a past surgical history that includes Gallbladder  surgery; Total hip arthroplasty; Cardiac pacemaker placement; Cardiac defibrillator placement; Prostatectomy; Appendectomy; and Cardiac pacemaker placement.    FamHx: family history includes Cancer in his father and mother.    SocHx:  reports that he quit smoking about 40 years ago. He quit after 20.00 years of use. He has never used smokeless tobacco. He reports that he does not drink alcohol or use drugs.      Physical Exam:  Vitals:    09/21/20 1513   BP: (!) 144/80   Temp: 97.3 °F (36.3 °C)     General: A&Ox3, no apparent distress, no deformities  Neck: No masses, normal thyroid  Lungs: normal inspiration, no use of accessory muscles  Heart: normal pulse, no arrhythmias  Abdomen: Soft, NT, ND  Skin: The skin is warm and dry. No jaundice.  Ext: No c/c/e.  :   8/16: Test desc ally, no abnormalities of epididymus. Penis normal, with normal penile and scrotal skin. Meatus normal. Normal rectal tone, no hemorrhoids. Prost surgically absent. Perineum and anus normal.    Labs/Studies: none today  PSA    8/15, 8/16, 8/18, 5/19: undetectable    Impression/Plan:   1. Prostate cancer: PSA today.  2. UTI today.  UA/UCx and cipro rx for same  3. HTN: elev today, takes his meds; discussed

## 2020-09-21 NOTE — PROGRESS NOTES
Subjective:       Patient ID: Curtis Landry is a 79 y.o. male.    Chief Complaint: Annual Exam    Patient presents to clinic today for annual physical exam. His cousin is present with him today, she provides transportation. Patient reports home health came for physical therapy, he reports they don't come anymore. He reports little improvement with physical therapy. He lives alone, daughter lives in [place]. He reports he does okay with ADLs. He has family/friends that bring meals. He has life alert. He reports left foot pain for a couple of days. He also reports bilateral low back pain for a couple of days. He has not taken tylenol arthritis. Patient is otherwise without concerns today.    Review of Systems   Constitutional: Negative for chills, fatigue, fever and unexpected weight change.   HENT: Negative for congestion, dental problem, ear pain, hearing loss, rhinorrhea and trouble swallowing.    Eyes: Positive for visual disturbance (chronic, stable). Negative for pain.   Respiratory: Positive for shortness of breath (chronic, stable). Negative for cough.    Cardiovascular: Negative for chest pain, palpitations and leg swelling.   Gastrointestinal: Positive for constipation (chronic, stable). Negative for abdominal distention, abdominal pain, blood in stool, diarrhea, nausea and vomiting.   Genitourinary: Negative for difficulty urinating, scrotal swelling and testicular pain.   Musculoskeletal: Positive for arthralgias. Negative for myalgias.   Skin: Negative for rash.   Neurological: Negative for dizziness, weakness, numbness and headaches.   Hematological: Negative for adenopathy. Does not bruise/bleed easily.   Psychiatric/Behavioral: Negative for dysphoric mood and sleep disturbance. The patient is not nervous/anxious.          Objective:      Physical Exam  Vitals signs reviewed.   Constitutional:       General: He is not in acute distress.     Appearance: He is well-developed.   HENT:      Head:  Normocephalic and atraumatic.      Right Ear: Hearing, tympanic membrane, ear canal and external ear normal.      Left Ear: Hearing, tympanic membrane, ear canal and external ear normal.      Nose: Nose normal.      Mouth/Throat:      Pharynx: Uvula midline.   Eyes:      General: Lids are normal. No scleral icterus.     Conjunctiva/sclera: Conjunctivae normal.      Pupils: Pupils are equal, round, and reactive to light.   Neck:      Musculoskeletal: Normal range of motion and neck supple.      Thyroid: No thyromegaly.   Cardiovascular:      Rate and Rhythm: Normal rate and regular rhythm.      Heart sounds: No murmur. No friction rub. No gallop.    Pulmonary:      Effort: Pulmonary effort is normal.      Breath sounds: Normal breath sounds. No wheezing or rales.   Abdominal:      General: Bowel sounds are normal. There is no distension.      Palpations: Abdomen is soft. There is no mass.      Tenderness: There is no abdominal tenderness.   Musculoskeletal: Normal range of motion.      Left foot: Normal range of motion and normal capillary refill. Tenderness present. No bony tenderness, swelling, crepitus, deformity or laceration.        Feet:    Lymphadenopathy:      Cervical: No cervical adenopathy.   Skin:     General: Skin is warm and dry.      Findings: No rash.   Neurological:      Mental Status: He is alert and oriented to person, place, and time.      Cranial Nerves: No cranial nerve deficit.      Comments: Ambulates with cane   Psychiatric:         Mood and Affect: Mood and affect normal.         Assessment:       1. Routine general medical examination at a health care facility    2. Essential hypertension    3. Hyperlipidemia, unspecified hyperlipidemia type    4. History of prostate cancer    5. Need for hepatitis C screening test    6. Anemia in chronic illness    7. MGUS (monoclonal gammopathy of unknown significance)    8. Obesity (BMI 30.0-34.9)        Plan:     Problem List Items Addressed This Visit      Anemia in chronic illness    Current Assessment & Plan     Status pending labs         Relevant Orders    Ambulatory referral/consult to Hematology / Oncology    Essential hypertension    Current Assessment & Plan     Controlled, continue coreg         Relevant Orders    TSH    CBC auto differential    History of prostate cancer    Overview     Followed by Dr. Paul, Urology         Current Assessment & Plan     Appointment today         Hyperlipidemia    Current Assessment & Plan     Status pending labs         Relevant Orders    Comprehensive metabolic panel    Lipid Panel    MGUS (monoclonal gammopathy of unknown significance)    Overview     Reports not seeing outside hematology         Relevant Orders    Ambulatory referral/consult to Hematology / Oncology    Obesity (BMI 30.0-34.9)    Current Assessment & Plan     Body mass index is 32.33 kg/m².           Other Visit Diagnoses     Routine general medical examination at a health care facility    -  Primary    Need for hepatitis C screening test        Relevant Orders    Hepatitis C Antibody        HRA reviewed. Scheduled with Audiology today. Family/patient deny any memory issues of significance, they are comfortable with patient living alone.  Followed by Cardiology and Urology.  Health Maintenance reviewed/updated.  Advised to try tylenol arthritis prn aches/pains, if worse/persistent advised follow up.  If left foot pain persists, advised to contact me for podiatry appointment.  Patient and caregiver expressed understanding and agreement with plan.

## 2020-09-21 NOTE — PROGRESS NOTES
Referring provider: Yudith Ordonez NP    Curtis Landry was seen 09/21/2020 for an audiological evaluation.  Patient is referred due to failed whisper test at PCP. He reports hearing loss that has gradually decreased over many years, noting equally sided hearing.  No tinnitus. He has long history of occupational loud noise exposure. He is not bothered by his hearing loss.     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   Speech Reception Thresholds were 30 dBHL for the right ear and 35 dBHL for the left ear.   Word recognition scores were fair for the right ear and fair for the left ear.   Tympanograms were Type A for the right ear and Type A for the left ear.    Patient and his cousin were counseled on the above findings.    Recommendations:  1. Hearing aids, binaural. Patient was provided a copy of his audiogram and notified about [employer]. He is not interested in amplification.

## 2020-09-21 NOTE — Clinical Note
Please schedule patient for follow up with Hem/Onc and notify; last visit was in 2017 and follow up was advised. Thanks

## 2020-09-22 LAB — COMPLEXED PSA SERPL-MCNC: <0.01 NG/ML (ref 0–4)

## 2020-09-23 LAB — BACTERIA UR CULT: ABNORMAL

## 2020-10-01 NOTE — PROGRESS NOTES
Subjective:       Patient ID: Curtis Landry is a 79 y.o. male.    Chief Complaint: MGUS (monoclonal gammopathy of unknown significance) [D47.2]  HPI: We have an opportunity to see Mr. Curtis Landry in Hematology Oncology clinic at [facility] on 10/01/2020.  Mr. Curtis Landry is a 79 y.o. 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  Has h/o prostate cancer s/p prostatectomy.    Oncology History    No history exists.     Past Medical History:   Diagnosis Date    Anemia     Angina pectoris     Arthritis     CHF (congestive heart failure)     Glaucoma (increased eye pressure)     Followed by outside opthalmology    HTN (hypertension)     Hyperlipidemia     Macular degeneration     Pneumonia     did not require hospitalization    Prostate cancer     Prostate cancer     Urinary incontinence      Family History   Problem Relation Age of Onset    Cancer Mother         pancreas    Cancer Father     Diabetes Neg Hx     Heart disease Neg Hx     Hyperlipidemia Neg Hx     Hypertension Neg Hx     Kidney disease Neg Hx     Stroke Neg Hx      Social History     Socioeconomic History    Marital status:      Spouse name: Not on file    Number of children: 1    Years of education: Not on file    Highest education level: 8th grade   Occupational History    Not on file   Social Needs    Financial resource strain: Not hard at all    Food insecurity     Worry: Never true     Inability: Never true    Transportation needs     Medical: No     Non-medical: No   Tobacco Use    Smoking status: Former Smoker     Years: 20.00     Quit date: 1980     Years since quittin.7    Smokeless tobacco: Never Used   Substance and Sexual Activity    Alcohol use: No    Drug use: No    Sexual activity: Not Currently     Partners: Female   Lifestyle    Physical activity     Days per week: 0 days     Minutes per session: 0 min    Stress: Not at all   Relationships     Social connections     Talks on phone: More than three times a week     Gets together: More than three times a week     Attends [employer] service: More than 4 times per year     Active member of club or organization: Yes     Attends meetings of clubs or organizations: More than 4 times per year     Relationship status:    Other Topics Concern    Not on file   Social History Narrative    Not on file     Past Surgical History:   Procedure Laterality Date    APPENDECTOMY      CARDIAC DEFIBRILLATOR PLACEMENT      CARDIAC PACEMAKER PLACEMENT      CARDIAC PACEMAKER PLACEMENT      GALLBLADDER SURGERY      PROSTATECTOMY      TOTAL HIP ARTHROPLASTY       Current Outpatient Medications   Medication Sig Dispense Refill    acetaminophen (TYLENOL) 650 MG TbSR Take 1 tablet (650 mg total) by mouth every 8 (eight) hours.  0    aspirin (ECOTRIN) 81 MG EC tablet Take by mouth. 1 Tablet, Delayed Release (E.C.) Oral Every day      carvedilol (COREG) 12.5 MG tablet Take 1 tablet (12.5 mg total) by mouth 2 (two) times daily. 60 tablet 11    dorzolamide-timolol 2-0.5% (COSOPT) 22.3-6.8 mg/mL ophthalmic solution Place 1 drop into both eyes 2 (two) times daily.  4    ferrous gluconate (FERGON) 240 (27 FE) MG tablet Take 240 mg by mouth every other day.      travoprost (TRAVATAN Z) 0.004 % Drop Inject into the eye. 1 Drops Ophthalmic At bedtime       No current facility-administered medications for this visit.        Labs:  Lab Results   Component Value Date    WBC 5.56 10/25/2019    HGB 11.8 (L) 10/25/2019    HCT 35.4 (L) 10/25/2019    MCV 93 10/25/2019     10/25/2019     BMP  Lab Results   Component Value Date     11/18/2019    K 4.4 11/18/2019     11/18/2019    CO2 26 11/18/2019    BUN 17 11/18/2019    CREATININE 1.3 11/18/2019    CALCIUM 10.8 (H) 11/18/2019    ANIONGAP 9 11/18/2019    ESTGFRAFRICA 60 11/18/2019    EGFRNONAA 52 (A) 11/18/2019     Lab Results   Component Value Date    ALT 21  11/18/2019    AST 18 11/18/2019    ALKPHOS 50 (L) 11/18/2019    BILITOT 0.6 11/18/2019       Lab Results   Component Value Date    IRON 72 08/27/2014    TIBC 311 08/27/2014    FERRITIN 747 (H) 08/27/2014     Lab Results   Component Value Date    GTJZOZMA32 1651 (H) 08/27/2014     Lab Results   Component Value Date    FOLATE >40.0 (H) 08/27/2014     Lab Results   Component Value Date    TSH 1.618 10/25/2019       I have reviewed the radiology reports and examined the scan/xray images.    Review of Systems   Constitutional: Negative.    HENT: Negative.    Eyes: Negative.    Respiratory: Negative.    Cardiovascular: Negative.    Gastrointestinal: Negative.    Endocrine: Negative.    Genitourinary: Negative.    Musculoskeletal: Negative.    Skin: Negative.    Allergic/Immunologic: Negative.    Neurological: Negative.    Hematological: Negative.    Psychiatric/Behavioral: Negative.      ECOG SCORE    0 - Fully active-able to carry on all pre-disease performance without restriction            Objective:     Vitals:    10/02/20 1005   BP: (!) 169/94   Pulse: 84   Temp: 98.4 °F (36.9 °C)   Body mass index is 31.24 kg/m².  Physical Exam  Vitals signs and nursing note reviewed.   Constitutional:       Appearance: He is well-developed.   HENT:      Head: Normocephalic and atraumatic.   Eyes:      Conjunctiva/sclera: Conjunctivae normal.   Neck:      Musculoskeletal: Normal range of motion and neck supple.   Cardiovascular:      Rate and Rhythm: Normal rate and regular rhythm.   Pulmonary:      Effort: Pulmonary effort is normal.      Breath sounds: Normal breath sounds.   Abdominal:      General: Bowel sounds are normal.      Palpations: Abdomen is soft.   Musculoskeletal: Normal range of motion.   Skin:     General: Skin is warm and dry.   Neurological:      Mental Status: He is alert and oriented to person, place, and time.   Psychiatric:         Behavior: Behavior normal.         Thought Content: Thought content normal.          Judgment: Judgment normal.           Assessment:      1. Smoldering myeloma    2. MGUS (monoclonal gammopathy of unknown significance)    3. History of prostate cancer    4. Anemia in chronic illness    5. Nutritional anemia           Plan:     Smoldering myeloma  Had 14% plasma cells in marrow in 2014, consider repeat bone marrow biopsy.  -     CBC auto differential; Future; Expected date: 10/02/2020  -     Comprehensive metabolic panel; Future; Expected date: 10/02/2020  -     Iron and TIBC; Future; Expected date: 10/02/2020  -     Ferritin; Future; Expected date: 10/02/2020  -     Folate; Future; Expected date: 10/02/2020  -     Vitamin B12; Future; Expected date: 10/02/2020  -     TSH; Future; Expected date: 10/02/2020  -     Protein electrophoresis, serum; Future; Expected date: 10/02/2020  -     Immunofixation Electrophoresis; Future; Expected date: 10/02/2020  -     Immunoglobulin Free LT Chains Blood; Future; Expected date: 10/02/2020  -     X-Ray Metastatic Survey; Future; Expected date: 10/02/2020    MGUS (monoclonal gammopathy of unknown significance)  -     Ambulatory referral/consult to Hematology / Oncology    History of prostate cancer    Anemia in chronic illness  -     Ambulatory referral/consult to Hematology / Oncology    Nutritional anemia  -     Folate; Future; Expected date: 10/02/2020  -     Vitamin B12; Future; Expected date: 10/02/2020  -     TSH; Future; Expected date: 10/02/2020

## 2020-10-02 ENCOUNTER — HOSPITAL ENCOUNTER (OUTPATIENT)
Dept: RADIOLOGY | Facility: HOSPITAL | Age: 80
Discharge: HOME | End: 2020-10-02
Attending: INTERNAL MEDICINE
Payer: MEDICARE

## 2020-10-02 ENCOUNTER — OFFICE VISIT (OUTPATIENT)
Dept: HEMATOLOGY/ONCOLOGY | Facility: CLINIC | Age: 80
End: 2020-10-02
Payer: MEDICARE

## 2020-10-02 VITALS
OXYGEN SATURATION: 98 % | TEMPERATURE: 98 F | BODY MASS INDEX: 31.11 KG/M2 | HEART RATE: 84 BPM | SYSTOLIC BLOOD PRESSURE: 169 MMHG | HEIGHT: 66 IN | DIASTOLIC BLOOD PRESSURE: 94 MMHG | WEIGHT: 193.56 LBS

## 2020-10-02 DIAGNOSIS — D47.2 SMOLDERING MYELOMA: ICD-10-CM

## 2020-10-02 DIAGNOSIS — D47.2 MGUS (MONOCLONAL GAMMOPATHY OF UNKNOWN SIGNIFICANCE): ICD-10-CM

## 2020-10-02 DIAGNOSIS — D53.9 NUTRITIONAL ANEMIA: ICD-10-CM

## 2020-10-02 DIAGNOSIS — D63.8 ANEMIA IN CHRONIC ILLNESS: ICD-10-CM

## 2020-10-02 DIAGNOSIS — Z85.46 HISTORY OF PROSTATE CANCER: ICD-10-CM

## 2020-10-02 DIAGNOSIS — D47.2 SMOLDERING MYELOMA: Primary | ICD-10-CM

## 2020-10-02 PROCEDURE — 99999 PR PBB SHADOW E&M-EST. PATIENT-LVL III: CPT | Mod: PBBFAC,HCNC,, | Performed by: INTERNAL MEDICINE

## 2020-10-02 PROCEDURE — 1126F PR PAIN SEVERITY QUANTIFIED, NO PAIN PRESENT: ICD-10-PCS | Mod: HCNC,S$GLB,, | Performed by: INTERNAL MEDICINE

## 2020-10-02 PROCEDURE — 99204 PR OFFICE/OUTPT VISIT, NEW, LEVL IV, 45-59 MIN: ICD-10-PCS | Mod: HCNC,S$GLB,, | Performed by: INTERNAL MEDICINE

## 2020-10-02 PROCEDURE — 1126F AMNT PAIN NOTED NONE PRSNT: CPT | Mod: HCNC,S$GLB,, | Performed by: INTERNAL MEDICINE

## 2020-10-02 PROCEDURE — 99999 PR PBB SHADOW E&M-EST. PATIENT-LVL III: ICD-10-PCS | Mod: PBBFAC,HCNC,, | Performed by: INTERNAL MEDICINE

## 2020-10-02 PROCEDURE — 99499 UNLISTED E&M SERVICE: CPT | Mod: S$GLB,,, | Performed by: INTERNAL MEDICINE

## 2020-10-02 PROCEDURE — 99204 OFFICE O/P NEW MOD 45 MIN: CPT | Mod: HCNC,S$GLB,, | Performed by: INTERNAL MEDICINE

## 2020-10-02 PROCEDURE — 99499 RISK ADDL DX/OHS AUDIT: ICD-10-PCS | Mod: S$GLB,,, | Performed by: INTERNAL MEDICINE

## 2020-10-02 PROCEDURE — 77075 RADEX OSSEOUS SURVEY COMPL: CPT | Mod: TC,HCNC

## 2020-10-02 PROCEDURE — 1101F PT FALLS ASSESS-DOCD LE1/YR: CPT | Mod: HCNC,CPTII,S$GLB, | Performed by: INTERNAL MEDICINE

## 2020-10-02 PROCEDURE — 1159F PR MEDICATION LIST DOCUMENTED IN MEDICAL RECORD: ICD-10-PCS | Mod: HCNC,S$GLB,, | Performed by: INTERNAL MEDICINE

## 2020-10-02 PROCEDURE — 77075 XR METASTATIC SURVEY: ICD-10-PCS | Mod: 26,HCNC,, | Performed by: RADIOLOGY

## 2020-10-02 PROCEDURE — 3080F PR MOST RECENT DIASTOLIC BLOOD PRESSURE >= 90 MM HG: ICD-10-PCS | Mod: HCNC,CPTII,S$GLB, | Performed by: INTERNAL MEDICINE

## 2020-10-02 PROCEDURE — 3077F SYST BP >= 140 MM HG: CPT | Mod: HCNC,CPTII,S$GLB, | Performed by: INTERNAL MEDICINE

## 2020-10-02 PROCEDURE — 3080F DIAST BP >= 90 MM HG: CPT | Mod: HCNC,CPTII,S$GLB, | Performed by: INTERNAL MEDICINE

## 2020-10-02 PROCEDURE — 77075 RADEX OSSEOUS SURVEY COMPL: CPT | Mod: 26,HCNC,, | Performed by: RADIOLOGY

## 2020-10-02 PROCEDURE — 1159F MED LIST DOCD IN RCRD: CPT | Mod: HCNC,S$GLB,, | Performed by: INTERNAL MEDICINE

## 2020-10-02 PROCEDURE — 1101F PR PT FALLS ASSESS DOC 0-1 FALLS W/OUT INJ PAST YR: ICD-10-PCS | Mod: HCNC,CPTII,S$GLB, | Performed by: INTERNAL MEDICINE

## 2020-10-02 PROCEDURE — 3077F PR MOST RECENT SYSTOLIC BLOOD PRESSURE >= 140 MM HG: ICD-10-PCS | Mod: HCNC,CPTII,S$GLB, | Performed by: INTERNAL MEDICINE

## 2020-10-02 NOTE — LETTER
October 2, 2020      Liz Hall MD  [address] [place] [address]            [facility]  [address]  [place] [address]  Phone: [phone]  Fax: [phone]          Patient: Curtis Landry   MR Number: [id number]   YOB: 1940   Date of Visit: 10/2/2020       Dear Dr. Liz Hall:    Thank you for referring Curtis Landry to me for evaluation. Attached you will find relevant portions of my assessment and plan of care.    If you have questions, please do not hesitate to call me. I look forward to following Curtis Landry along with you.    Sincerely,    Lg Singh MD    Enclosure  CC:  No Recipients    If you would like to receive this communication electronically, please contact [email or web address] or [phone] to request more information on [facility] access.    For providers and/or their staff who would like to refer a patient to [facility], please contact us through our one-stop-shop provider referral line, Eyal Pearson, at [phone].    If you feel you have received this communication in error or would no longer like to receive these types of communications, please e-mail [email or web address]

## 2020-10-13 ENCOUNTER — EXTERNAL HOME HEALTH (OUTPATIENT)
Dept: HOME HEALTH SERVICES | Facility: HOSPITAL | Age: 80
End: 2020-10-13
Payer: MEDICARE

## 2020-11-11 DIAGNOSIS — I10 ESSENTIAL HYPERTENSION: ICD-10-CM

## 2020-11-11 RX ORDER — CARVEDILOL 12.5 MG/1
12.5 TABLET ORAL 2 TIMES DAILY
Qty: 60 TABLET | Refills: 11 | Status: SHIPPED | OUTPATIENT
Start: 2020-11-11 | End: 2021-02-22 | Stop reason: SDUPTHER

## 2020-11-13 ENCOUNTER — OFFICE VISIT (OUTPATIENT)
Dept: HEMATOLOGY/ONCOLOGY | Facility: CLINIC | Age: 80
End: 2020-11-13
Payer: MEDICARE

## 2020-11-13 VITALS
BODY MASS INDEX: 30.93 KG/M2 | TEMPERATURE: 99 F | HEART RATE: 72 BPM | WEIGHT: 192.44 LBS | SYSTOLIC BLOOD PRESSURE: 156 MMHG | DIASTOLIC BLOOD PRESSURE: 87 MMHG | OXYGEN SATURATION: 99 % | HEIGHT: 66 IN

## 2020-11-13 DIAGNOSIS — D47.2 MGUS (MONOCLONAL GAMMOPATHY OF UNKNOWN SIGNIFICANCE): Primary | ICD-10-CM

## 2020-11-13 DIAGNOSIS — Z85.46 HISTORY OF PROSTATE CANCER: ICD-10-CM

## 2020-11-13 PROCEDURE — 1159F PR MEDICATION LIST DOCUMENTED IN MEDICAL RECORD: ICD-10-PCS | Mod: HCNC,S$GLB,, | Performed by: INTERNAL MEDICINE

## 2020-11-13 PROCEDURE — 3079F DIAST BP 80-89 MM HG: CPT | Mod: HCNC,CPTII,S$GLB, | Performed by: INTERNAL MEDICINE

## 2020-11-13 PROCEDURE — 99999 PR PBB SHADOW E&M-EST. PATIENT-LVL III: CPT | Mod: PBBFAC,HCNC,, | Performed by: INTERNAL MEDICINE

## 2020-11-13 PROCEDURE — 3079F PR MOST RECENT DIASTOLIC BLOOD PRESSURE 80-89 MM HG: ICD-10-PCS | Mod: HCNC,CPTII,S$GLB, | Performed by: INTERNAL MEDICINE

## 2020-11-13 PROCEDURE — 3288F FALL RISK ASSESSMENT DOCD: CPT | Mod: HCNC,CPTII,S$GLB, | Performed by: INTERNAL MEDICINE

## 2020-11-13 PROCEDURE — 3077F PR MOST RECENT SYSTOLIC BLOOD PRESSURE >= 140 MM HG: ICD-10-PCS | Mod: HCNC,CPTII,S$GLB, | Performed by: INTERNAL MEDICINE

## 2020-11-13 PROCEDURE — 1126F AMNT PAIN NOTED NONE PRSNT: CPT | Mod: HCNC,S$GLB,, | Performed by: INTERNAL MEDICINE

## 2020-11-13 PROCEDURE — 99214 PR OFFICE/OUTPT VISIT, EST, LEVL IV, 30-39 MIN: ICD-10-PCS | Mod: HCNC,S$GLB,, | Performed by: INTERNAL MEDICINE

## 2020-11-13 PROCEDURE — 3288F PR FALLS RISK ASSESSMENT DOCUMENTED: ICD-10-PCS | Mod: HCNC,CPTII,S$GLB, | Performed by: INTERNAL MEDICINE

## 2020-11-13 PROCEDURE — 1101F PR PT FALLS ASSESS DOC 0-1 FALLS W/OUT INJ PAST YR: ICD-10-PCS | Mod: HCNC,CPTII,S$GLB, | Performed by: INTERNAL MEDICINE

## 2020-11-13 PROCEDURE — 99999 PR PBB SHADOW E&M-EST. PATIENT-LVL III: ICD-10-PCS | Mod: PBBFAC,HCNC,, | Performed by: INTERNAL MEDICINE

## 2020-11-13 PROCEDURE — 1101F PT FALLS ASSESS-DOCD LE1/YR: CPT | Mod: HCNC,CPTII,S$GLB, | Performed by: INTERNAL MEDICINE

## 2020-11-13 PROCEDURE — 1159F MED LIST DOCD IN RCRD: CPT | Mod: HCNC,S$GLB,, | Performed by: INTERNAL MEDICINE

## 2020-11-13 PROCEDURE — 3077F SYST BP >= 140 MM HG: CPT | Mod: HCNC,CPTII,S$GLB, | Performed by: INTERNAL MEDICINE

## 2020-11-13 PROCEDURE — 99214 OFFICE O/P EST MOD 30 MIN: CPT | Mod: HCNC,S$GLB,, | Performed by: INTERNAL MEDICINE

## 2020-11-13 PROCEDURE — 1126F PR PAIN SEVERITY QUANTIFIED, NO PAIN PRESENT: ICD-10-PCS | Mod: HCNC,S$GLB,, | Performed by: INTERNAL MEDICINE

## 2020-11-13 NOTE — PROGRESS NOTES
Subjective:       Patient ID: Curtis Landry is a 79 y.o. male.    Chief Complaint: No chief complaint on file.    HPI The patient is a 79-year-old  male who presents to the hematology oncology clinic today for follow up of his chronic anemia and MGUS .  This a former patient of dr La who has a hx of chronic anemia and a MGUs of many years drartion,. He had a bone marrow in  that failed to show myeloma or other abnorrmalitites  PAST MEDICAL HISTORY:   1. Glaucoma  2. Asthma  3. Prostate adenocarcinoma diagnosed in 2001.   4. Erectile dysfunction  5. L5-S1 radiculopathy  6. SI joint dysfunction  7. L3-L4 disc herniation  8. Osteoarthritis  9. Nonischemic cardiomyopathy  10. Nonobstructive coronary artery disease    SURGICAL HISTORY:   1. Cholecystectomy  2. Appendectomy  3. Radical prostatectomy  4. Right total hip replacement in 2008  5. AICD placement in oct 2012    FAMILY HISTORY: The patient's mother  from complications related to an unknown type of cancer at the age of 86. She was diagnosed at the age of 84. He denies any other immediate family members with cancer or bleeding/clotting disorders.    SOCIAL HISTORY: He reports that he was a light smoker and quit more than 26 years ago. He reports that he was a heavy drinker of alcohol for about 10-15 years and quit more than 27 years ago. He denies using any recreational drugs. He retired in . He is . He lives in [place], [place]. He has one daughter and she lives in [place].    ALLERGIES: Reviewed on medication card.    MEDICATIONS: [Medcard has been reviewed and/or reconciled.]   Review of Systems   Constitutional: Negative.  Negative for fatigue.   Eyes: Negative.    Cardiovascular: Negative.  Negative for chest pain.   Gastrointestinal: Negative for abdominal pain and nausea.   Genitourinary: Negative.  Negative for hematuria.   Musculoskeletal: Negative.    Integumentary:  Negative.    Neurological: Negative.    Psychiatric/Behavioral: Negative.          Objective:      Physical Exam  Constitutional:       Appearance: He is well-developed.   HENT:      Head: Normocephalic.      Nose: Nose normal.      Right Sinus: No maxillary sinus tenderness or frontal sinus tenderness.      Left Sinus: No maxillary sinus tenderness or frontal sinus tenderness.      Mouth/Throat:      Pharynx: No oropharyngeal exudate.   Eyes:      General: Lids are normal.      Conjunctiva/sclera: Conjunctivae normal.      Pupils: Pupils are equal, round, and reactive to light.   Neck:      Musculoskeletal: Normal range of motion and neck supple.      Thyroid: No thyroid mass or thyromegaly.      Trachea: Trachea normal.      Comments: No crepitus.  Cardiovascular:      Rate and Rhythm: Normal rate and regular rhythm.      Pulses: Normal pulses.      Heart sounds: Normal heart sounds, S1 normal and S2 normal. No murmur. No gallop.       Comments:    Carotid exam normal  Skin temperature in extremities is normal  No edema of extremities  Pulmonary:      Effort: Pulmonary effort is normal. No accessory muscle usage or respiratory distress.      Breath sounds: No wheezing or rales.   Abdominal:      General: Bowel sounds are normal. There is no distension.      Palpations: Abdomen is soft. There is no hepatomegaly, splenomegaly or mass.      Tenderness: There is no abdominal tenderness. There is no guarding or rebound.   Musculoskeletal: Normal range of motion.      Right hand: Normal.      Left hand: Normal.   Lymphadenopathy:      Cervical: No cervical adenopathy.      Upper Body:      Right upper body: No supraclavicular adenopathy.      Left upper body: No supraclavicular adenopathy.   Skin:     General: Skin is warm and dry.      Findings: No abrasion, bruising, ecchymosis, erythema, lesion, petechiae or rash.      Nails: There is no clubbing.     Neurological:      Mental Status: He is alert and oriented to person, place,  and time.      Comments:     Psychiatric:         Behavior: Behavior normal.         Assessment:       1. MGUS (monoclonal gammopathy of unknown significance)    2. History of prostate cancer        Plan:       Lab Results   Component Value Date    WBC 5.55 10/02/2020    WBC 5.85 10/02/2020    HGB 12.6 (L) 10/02/2020    HGB 12.6 (L) 10/02/2020    HCT 39.1 (L) 10/02/2020    HCT 38.2 (L) 10/02/2020    MCV 94 10/02/2020    MCV 93 10/02/2020     10/02/2020     10/02/2020       Lab Results   Component Value Date    CREATININE 1.3 10/02/2020    CREATININE 1.3 10/02/2020     Lab Results   Component Value Date    ALT 16 10/02/2020    ALT 18 10/02/2020    AST 19 10/02/2020    AST 18 10/02/2020    ALKPHOS 56 10/02/2020    ALKPHOS 54 (L) 10/02/2020    BILITOT 0.7 10/02/2020    BILITOT 0.7 10/02/2020     Lab Results   Component Value Date    IRON 79 10/02/2020    TIBC 354 10/02/2020    FERRITIN 773 (H) 10/02/2020     Lab Results   Component Value Date    DVEUKBDE74 440 10/02/2020        SPEp shows 2 monoclonal spikes identified as Ig-malbda and  Measuring0.71 gr (M 0.6 gr on 7/2017), and 0.92 ( 0.65 gr previously on 7/2017)    He seems to eb doing well.  We will continie to monitor expectantly. See us back in 4 months with a cbc/cmp/SPEP and diagnostic PSA ( hx of rpeviously treated prostate cancer).

## 2021-01-18 ENCOUNTER — HOSPITAL ENCOUNTER (EMERGENCY)
Facility: HOSPITAL | Age: 81
Discharge: HOME | End: 2021-01-18
Attending: EMERGENCY MEDICINE
Payer: MEDICARE

## 2021-01-18 VITALS
BODY MASS INDEX: 29.76 KG/M2 | WEIGHT: 185.19 LBS | TEMPERATURE: 98 F | HEART RATE: 68 BPM | HEIGHT: 66 IN | DIASTOLIC BLOOD PRESSURE: 73 MMHG | RESPIRATION RATE: 20 BRPM | OXYGEN SATURATION: 97 % | SYSTOLIC BLOOD PRESSURE: 141 MMHG

## 2021-01-18 DIAGNOSIS — Z95.0 PACEMAKER: ICD-10-CM

## 2021-01-18 DIAGNOSIS — R07.9 CHEST PAIN: ICD-10-CM

## 2021-01-18 DIAGNOSIS — R07.89 CHEST DISCOMFORT: Primary | ICD-10-CM

## 2021-01-18 DIAGNOSIS — R00.1 BRADYCARDIA: ICD-10-CM

## 2021-01-18 LAB
ALBUMIN SERPL BCP-MCNC: 4 G/DL (ref 3.5–5.2)
ALP SERPL-CCNC: 50 U/L (ref 55–135)
ALT SERPL W/O P-5'-P-CCNC: 22 U/L (ref 10–44)
ANION GAP SERPL CALC-SCNC: 6 MMOL/L (ref 8–16)
APTT BLDCRRT: 26.8 SEC (ref 21–32)
AST SERPL-CCNC: 18 U/L (ref 10–40)
BASOPHILS # BLD AUTO: 0.02 K/UL (ref 0–0.2)
BASOPHILS NFR BLD: 0.4 % (ref 0–1.9)
BILIRUB SERPL-MCNC: 0.6 MG/DL (ref 0.1–1)
BNP SERPL-MCNC: 18 PG/ML (ref 0–99)
BUN SERPL-MCNC: 24 MG/DL (ref 8–23)
CALCIUM SERPL-MCNC: 10.1 MG/DL (ref 8.7–10.5)
CHLORIDE SERPL-SCNC: 106 MMOL/L (ref 95–110)
CO2 SERPL-SCNC: 25 MMOL/L (ref 23–29)
CREAT SERPL-MCNC: 2.1 MG/DL (ref 0.5–1.4)
DIFFERENTIAL METHOD: ABNORMAL
EOSINOPHIL # BLD AUTO: 0.1 K/UL (ref 0–0.5)
EOSINOPHIL NFR BLD: 1.6 % (ref 0–8)
ERYTHROCYTE [DISTWIDTH] IN BLOOD BY AUTOMATED COUNT: 11.9 % (ref 11.5–14.5)
EST. GFR  (AFRICAN AMERICAN): 33 ML/MIN/1.73 M^2
EST. GFR  (NON AFRICAN AMERICAN): 29 ML/MIN/1.73 M^2
GLUCOSE SERPL-MCNC: 112 MG/DL (ref 70–110)
HCT VFR BLD AUTO: 35.4 % (ref 40–54)
HGB BLD-MCNC: 11.9 G/DL (ref 14–18)
IMM GRANULOCYTES # BLD AUTO: 0.02 K/UL (ref 0–0.04)
IMM GRANULOCYTES NFR BLD AUTO: 0.4 % (ref 0–0.5)
INR PPP: 1.1 (ref 0.8–1.2)
LYMPHOCYTES # BLD AUTO: 2.1 K/UL (ref 1–4.8)
LYMPHOCYTES NFR BLD: 37.3 % (ref 18–48)
MCH RBC QN AUTO: 31 PG (ref 27–31)
MCHC RBC AUTO-ENTMCNC: 33.6 G/DL (ref 32–36)
MCV RBC AUTO: 92 FL (ref 82–98)
MONOCYTES # BLD AUTO: 0.7 K/UL (ref 0.3–1)
MONOCYTES NFR BLD: 11.5 % (ref 4–15)
NEUTROPHILS # BLD AUTO: 2.8 K/UL (ref 1.8–7.7)
NEUTROPHILS NFR BLD: 48.8 % (ref 38–73)
NRBC BLD-RTO: 0 /100 WBC
PLATELET # BLD AUTO: 211 K/UL (ref 150–350)
PMV BLD AUTO: 9.4 FL (ref 9.2–12.9)
POTASSIUM SERPL-SCNC: 4.7 MMOL/L (ref 3.5–5.1)
PROT SERPL-MCNC: 8.5 G/DL (ref 6–8.4)
PROTHROMBIN TIME: 11.4 SEC (ref 9–12.5)
RBC # BLD AUTO: 3.84 M/UL (ref 4.6–6.2)
SODIUM SERPL-SCNC: 137 MMOL/L (ref 136–145)
TROPONIN I SERPL DL<=0.01 NG/ML-MCNC: 0.01 NG/ML (ref 0–0.03)
TROPONIN I SERPL DL<=0.01 NG/ML-MCNC: 0.01 NG/ML (ref 0–0.03)
WBC # BLD AUTO: 5.65 K/UL (ref 3.9–12.7)

## 2021-01-18 PROCEDURE — 85730 THROMBOPLASTIN TIME PARTIAL: CPT

## 2021-01-18 PROCEDURE — 85025 COMPLETE CBC W/AUTO DIFF WBC: CPT

## 2021-01-18 PROCEDURE — 84484 ASSAY OF TROPONIN QUANT: CPT | Mod: 91

## 2021-01-18 PROCEDURE — 25000003 PHARM REV CODE 250: Performed by: EMERGENCY MEDICINE

## 2021-01-18 PROCEDURE — 83880 ASSAY OF NATRIURETIC PEPTIDE: CPT

## 2021-01-18 PROCEDURE — 93010 ELECTROCARDIOGRAM REPORT: CPT | Mod: ,,, | Performed by: INTERNAL MEDICINE

## 2021-01-18 PROCEDURE — 93005 ELECTROCARDIOGRAM TRACING: CPT

## 2021-01-18 PROCEDURE — 93010 EKG 12-LEAD: ICD-10-PCS | Mod: ,,, | Performed by: INTERNAL MEDICINE

## 2021-01-18 PROCEDURE — 80053 COMPREHEN METABOLIC PANEL: CPT

## 2021-01-18 PROCEDURE — 85610 PROTHROMBIN TIME: CPT

## 2021-01-18 PROCEDURE — 99285 EMERGENCY DEPT VISIT HI MDM: CPT | Mod: 25

## 2021-01-18 RX ORDER — HYDROCODONE BITARTRATE AND ACETAMINOPHEN 5; 325 MG/1; MG/1
1 TABLET ORAL
Status: COMPLETED | OUTPATIENT
Start: 2021-01-18 | End: 2021-01-18

## 2021-01-18 RX ADMIN — HYDROCODONE BITARTRATE AND ACETAMINOPHEN 1 TABLET: 5; 325 TABLET ORAL at 12:01

## 2021-01-27 ENCOUNTER — HOSPITAL ENCOUNTER (OUTPATIENT)
Dept: CARDIOLOGY | Facility: HOSPITAL | Age: 81
Discharge: HOME | End: 2021-01-27
Attending: NUCLEAR MEDICINE
Payer: MEDICARE

## 2021-01-27 DIAGNOSIS — I25.5 ISCHEMIC CARDIOMYOPATHY: ICD-10-CM

## 2021-01-27 DIAGNOSIS — Z95.810 ICD (IMPLANTABLE CARDIOVERTER-DEFIBRILLATOR) IN PLACE: ICD-10-CM

## 2021-01-27 DIAGNOSIS — I50.9 CHF (NYHA CLASS III, ACC/AHA STAGE C): ICD-10-CM

## 2021-01-27 PROCEDURE — 93283 PRGRMG EVAL IMPLANTABLE DFB: CPT

## 2021-01-27 PROCEDURE — 93283 CARDIAC DEVICE CHECK - IN CLINIC & HOSPITAL: ICD-10-PCS | Mod: 26,,, | Performed by: INTERNAL MEDICINE

## 2021-01-27 PROCEDURE — 93283 PRGRMG EVAL IMPLANTABLE DFB: CPT | Mod: 26,,, | Performed by: INTERNAL MEDICINE

## 2021-01-28 LAB
AV DELAY - LONGEST: 300 MSEC
AV DELAY - SHORTEST: 190 MSEC
CHARGE TIME (SEC): 10.9 SEC
HV IMPEDANCE (OHM): 68 OHM
IMPEDANCE RA LEAD (NATIVE): 795 OHMS
IMPEDANCE RA LEAD: 760 OHMS
OHS CV DC PP MS1: 0.5 MS
OHS CV DC PP MS2: 0.5 MS
OHS CV DC PP V1: 2 V
OHS CV DC PP V2: 2 V
P/R-WAVE RA LEAD (NATIVE): 12.2 MV
P/R-WAVE RA LEAD: 2.8 MV
THRESHOLD MS RA LEAD (NATIVE): 0.5 MS
THRESHOLD MS RA LEAD: 0.5 MS
THRESHOLD V RA LEAD (NATIVE): 0.6 V
THRESHOLD V RA LEAD: 0.8 V

## 2021-02-22 ENCOUNTER — OFFICE VISIT (OUTPATIENT)
Dept: TRANSPLANT | Facility: CLINIC | Age: 81
End: 2021-02-22
Payer: MEDICARE

## 2021-02-22 VITALS
BODY MASS INDEX: 31.88 KG/M2 | OXYGEN SATURATION: 98 % | DIASTOLIC BLOOD PRESSURE: 60 MMHG | HEART RATE: 80 BPM | WEIGHT: 197.56 LBS | SYSTOLIC BLOOD PRESSURE: 110 MMHG

## 2021-02-22 DIAGNOSIS — Z95.810 AUTOMATIC IMPLANTABLE CARDIOVERTER-DEFIBRILLATOR IN SITU: ICD-10-CM

## 2021-02-22 DIAGNOSIS — I10 ESSENTIAL HYPERTENSION: ICD-10-CM

## 2021-02-22 DIAGNOSIS — I50.9 CHF (NYHA CLASS III, ACC/AHA STAGE C): Primary | Chronic | ICD-10-CM

## 2021-02-22 PROCEDURE — 3078F PR MOST RECENT DIASTOLIC BLOOD PRESSURE < 80 MM HG: ICD-10-PCS | Mod: CPTII,S$GLB,, | Performed by: NUCLEAR MEDICINE

## 2021-02-22 PROCEDURE — 3078F DIAST BP <80 MM HG: CPT | Mod: CPTII,S$GLB,, | Performed by: NUCLEAR MEDICINE

## 2021-02-22 PROCEDURE — 99999 PR PBB SHADOW E&M-EST. PATIENT-LVL III: ICD-10-PCS | Mod: PBBFAC,,, | Performed by: NUCLEAR MEDICINE

## 2021-02-22 PROCEDURE — 1159F MED LIST DOCD IN RCRD: CPT | Mod: S$GLB,,, | Performed by: NUCLEAR MEDICINE

## 2021-02-22 PROCEDURE — 99214 OFFICE O/P EST MOD 30 MIN: CPT | Mod: S$GLB,,, | Performed by: NUCLEAR MEDICINE

## 2021-02-22 PROCEDURE — 99214 PR OFFICE/OUTPT VISIT, EST, LEVL IV, 30-39 MIN: ICD-10-PCS | Mod: S$GLB,,, | Performed by: NUCLEAR MEDICINE

## 2021-02-22 PROCEDURE — 1159F PR MEDICATION LIST DOCUMENTED IN MEDICAL RECORD: ICD-10-PCS | Mod: S$GLB,,, | Performed by: NUCLEAR MEDICINE

## 2021-02-22 PROCEDURE — 99499 UNLISTED E&M SERVICE: CPT | Mod: S$GLB,,, | Performed by: NUCLEAR MEDICINE

## 2021-02-22 PROCEDURE — 3074F PR MOST RECENT SYSTOLIC BLOOD PRESSURE < 130 MM HG: ICD-10-PCS | Mod: CPTII,S$GLB,, | Performed by: NUCLEAR MEDICINE

## 2021-02-22 PROCEDURE — 99999 PR PBB SHADOW E&M-EST. PATIENT-LVL III: CPT | Mod: PBBFAC,,, | Performed by: NUCLEAR MEDICINE

## 2021-02-22 PROCEDURE — 3074F SYST BP LT 130 MM HG: CPT | Mod: CPTII,S$GLB,, | Performed by: NUCLEAR MEDICINE

## 2021-02-22 PROCEDURE — 99499 RISK ADDL DX/OHS AUDIT: ICD-10-PCS | Mod: S$GLB,,, | Performed by: NUCLEAR MEDICINE

## 2021-02-22 RX ORDER — CARVEDILOL 12.5 MG/1
12.5 TABLET ORAL 2 TIMES DAILY
Qty: 60 TABLET | Refills: 11 | Status: SHIPPED | OUTPATIENT
Start: 2021-02-22 | End: 2022-03-24 | Stop reason: SDUPTHER

## 2021-03-05 ENCOUNTER — IMMUNIZATION (OUTPATIENT)
Dept: INTERNAL MEDICINE | Facility: CLINIC | Age: 81
End: 2021-03-05
Payer: MEDICARE

## 2021-03-05 DIAGNOSIS — Z23 NEED FOR VACCINATION: Primary | ICD-10-CM

## 2021-03-05 PROCEDURE — 91300 COVID-19, MRNA, LNP-S, PF, 30 MCG/0.3 ML DOSE VACCINE: CPT | Mod: HCNC,PBBFAC | Performed by: FAMILY MEDICINE

## 2021-03-26 ENCOUNTER — IMMUNIZATION (OUTPATIENT)
Dept: INTERNAL MEDICINE | Facility: CLINIC | Age: 81
End: 2021-03-26
Payer: MEDICARE

## 2021-03-26 DIAGNOSIS — Z23 NEED FOR VACCINATION: Primary | ICD-10-CM

## 2021-03-26 PROCEDURE — 0002A COVID-19, MRNA, LNP-S, PF, 30 MCG/0.3 ML DOSE VACCINE: CPT | Mod: HCNC,PBBFAC | Performed by: FAMILY MEDICINE

## 2021-03-26 PROCEDURE — 91300 COVID-19, MRNA, LNP-S, PF, 30 MCG/0.3 ML DOSE VACCINE: CPT | Mod: HCNC,PBBFAC | Performed by: FAMILY MEDICINE

## 2021-05-05 ENCOUNTER — TELEPHONE (OUTPATIENT)
Dept: UROLOGY | Facility: CLINIC | Age: 81
End: 2021-05-05

## 2021-05-11 ENCOUNTER — OFFICE VISIT (OUTPATIENT)
Dept: HEMATOLOGY/ONCOLOGY | Facility: CLINIC | Age: 81
End: 2021-05-11
Payer: MEDICARE

## 2021-05-11 ENCOUNTER — HOSPITAL ENCOUNTER (OUTPATIENT)
Dept: CARDIOLOGY | Facility: HOSPITAL | Age: 81
Discharge: HOME | End: 2021-05-11
Attending: NUCLEAR MEDICINE
Payer: MEDICARE

## 2021-05-11 ENCOUNTER — OFFICE VISIT (OUTPATIENT)
Dept: TRANSPLANT | Facility: CLINIC | Age: 81
End: 2021-05-11
Payer: MEDICARE

## 2021-05-11 VITALS
DIASTOLIC BLOOD PRESSURE: 65 MMHG | WEIGHT: 202.38 LBS | BODY MASS INDEX: 32.53 KG/M2 | OXYGEN SATURATION: 98 % | HEIGHT: 66 IN | SYSTOLIC BLOOD PRESSURE: 105 MMHG | HEART RATE: 79 BPM

## 2021-05-11 VITALS
HEIGHT: 66 IN | WEIGHT: 202.19 LBS | DIASTOLIC BLOOD PRESSURE: 70 MMHG | OXYGEN SATURATION: 97 % | SYSTOLIC BLOOD PRESSURE: 126 MMHG | BODY MASS INDEX: 32.49 KG/M2 | TEMPERATURE: 98 F | HEART RATE: 76 BPM

## 2021-05-11 DIAGNOSIS — I50.9 CHF (NYHA CLASS III, ACC/AHA STAGE C): Primary | Chronic | ICD-10-CM

## 2021-05-11 DIAGNOSIS — I42.8 NICM (NONISCHEMIC CARDIOMYOPATHY): ICD-10-CM

## 2021-05-11 DIAGNOSIS — I10 ESSENTIAL HYPERTENSION: ICD-10-CM

## 2021-05-11 DIAGNOSIS — Z95.810 ICD (IMPLANTABLE CARDIOVERTER-DEFIBRILLATOR) IN PLACE: ICD-10-CM

## 2021-05-11 DIAGNOSIS — Z85.46 HISTORY OF PROSTATE CANCER: Primary | ICD-10-CM

## 2021-05-11 DIAGNOSIS — Z95.810 AUTOMATIC IMPLANTABLE CARDIOVERTER-DEFIBRILLATOR IN SITU: ICD-10-CM

## 2021-05-11 DIAGNOSIS — I25.5 ISCHEMIC CARDIOMYOPATHY: ICD-10-CM

## 2021-05-11 DIAGNOSIS — D47.2 MGUS (MONOCLONAL GAMMOPATHY OF UNKNOWN SIGNIFICANCE): ICD-10-CM

## 2021-05-11 DIAGNOSIS — I50.9 CHF (NYHA CLASS III, ACC/AHA STAGE C): ICD-10-CM

## 2021-05-11 PROCEDURE — 99999 PR PBB SHADOW E&M-EST. PATIENT-LVL III: CPT | Mod: PBBFAC,,, | Performed by: INTERNAL MEDICINE

## 2021-05-11 PROCEDURE — 93283 CARDIAC DEVICE CHECK - IN CLINIC & HOSPITAL: ICD-10-PCS | Mod: 26,,, | Performed by: INTERNAL MEDICINE

## 2021-05-11 PROCEDURE — 1126F AMNT PAIN NOTED NONE PRSNT: CPT | Mod: S$GLB,,, | Performed by: INTERNAL MEDICINE

## 2021-05-11 PROCEDURE — 99999 PR PBB SHADOW E&M-EST. PATIENT-LVL III: ICD-10-PCS | Mod: PBBFAC,,, | Performed by: INTERNAL MEDICINE

## 2021-05-11 PROCEDURE — 3078F PR MOST RECENT DIASTOLIC BLOOD PRESSURE < 80 MM HG: ICD-10-PCS | Mod: CPTII,S$GLB,, | Performed by: NUCLEAR MEDICINE

## 2021-05-11 PROCEDURE — 3074F PR MOST RECENT SYSTOLIC BLOOD PRESSURE < 130 MM HG: ICD-10-PCS | Mod: CPTII,S$GLB,, | Performed by: NUCLEAR MEDICINE

## 2021-05-11 PROCEDURE — 99999 PR PBB SHADOW E&M-EST. PATIENT-LVL III: CPT | Mod: PBBFAC,,, | Performed by: NUCLEAR MEDICINE

## 2021-05-11 PROCEDURE — 99214 OFFICE O/P EST MOD 30 MIN: CPT | Mod: S$GLB,,, | Performed by: INTERNAL MEDICINE

## 2021-05-11 PROCEDURE — 99214 PR OFFICE/OUTPT VISIT, EST, LEVL IV, 30-39 MIN: ICD-10-PCS | Mod: S$GLB,,, | Performed by: INTERNAL MEDICINE

## 2021-05-11 PROCEDURE — 1159F MED LIST DOCD IN RCRD: CPT | Mod: S$GLB,,, | Performed by: NUCLEAR MEDICINE

## 2021-05-11 PROCEDURE — 3288F PR FALLS RISK ASSESSMENT DOCUMENTED: ICD-10-PCS | Mod: CPTII,S$GLB,, | Performed by: INTERNAL MEDICINE

## 2021-05-11 PROCEDURE — 3074F SYST BP LT 130 MM HG: CPT | Mod: CPTII,S$GLB,, | Performed by: NUCLEAR MEDICINE

## 2021-05-11 PROCEDURE — 99214 PR OFFICE/OUTPT VISIT, EST, LEVL IV, 30-39 MIN: ICD-10-PCS | Mod: S$GLB,,, | Performed by: NUCLEAR MEDICINE

## 2021-05-11 PROCEDURE — 1159F PR MEDICATION LIST DOCUMENTED IN MEDICAL RECORD: ICD-10-PCS | Mod: S$GLB,,, | Performed by: NUCLEAR MEDICINE

## 2021-05-11 PROCEDURE — 1159F PR MEDICATION LIST DOCUMENTED IN MEDICAL RECORD: ICD-10-PCS | Mod: S$GLB,,, | Performed by: INTERNAL MEDICINE

## 2021-05-11 PROCEDURE — 93283 PRGRMG EVAL IMPLANTABLE DFB: CPT | Mod: 26,,, | Performed by: INTERNAL MEDICINE

## 2021-05-11 PROCEDURE — 1101F PT FALLS ASSESS-DOCD LE1/YR: CPT | Mod: CPTII,S$GLB,, | Performed by: INTERNAL MEDICINE

## 2021-05-11 PROCEDURE — 99999 PR PBB SHADOW E&M-EST. PATIENT-LVL III: ICD-10-PCS | Mod: PBBFAC,,, | Performed by: NUCLEAR MEDICINE

## 2021-05-11 PROCEDURE — 3288F FALL RISK ASSESSMENT DOCD: CPT | Mod: CPTII,S$GLB,, | Performed by: INTERNAL MEDICINE

## 2021-05-11 PROCEDURE — 1159F MED LIST DOCD IN RCRD: CPT | Mod: S$GLB,,, | Performed by: INTERNAL MEDICINE

## 2021-05-11 PROCEDURE — 3078F DIAST BP <80 MM HG: CPT | Mod: CPTII,S$GLB,, | Performed by: NUCLEAR MEDICINE

## 2021-05-11 PROCEDURE — 99214 OFFICE O/P EST MOD 30 MIN: CPT | Mod: S$GLB,,, | Performed by: NUCLEAR MEDICINE

## 2021-05-11 PROCEDURE — 1101F PR PT FALLS ASSESS DOC 0-1 FALLS W/OUT INJ PAST YR: ICD-10-PCS | Mod: CPTII,S$GLB,, | Performed by: INTERNAL MEDICINE

## 2021-05-11 PROCEDURE — 1126F PR PAIN SEVERITY QUANTIFIED, NO PAIN PRESENT: ICD-10-PCS | Mod: S$GLB,,, | Performed by: INTERNAL MEDICINE

## 2021-05-11 PROCEDURE — 93283 PRGRMG EVAL IMPLANTABLE DFB: CPT

## 2021-05-12 LAB
AV DELAY - LONGEST: 300 MSEC
AV DELAY - SHORTEST: 190 MSEC
CHARGE TIME (SEC): 11 SEC
HV IMPEDANCE (OHM): 70 OHM
IMPEDANCE RA LEAD (NATIVE): 767 OHMS
IMPEDANCE RA LEAD: 752 OHMS
OHS CV DC PP MS1: 0.5 MS
OHS CV DC PP MS2: 0.5 MS
OHS CV DC PP V1: 2 V
OHS CV DC PP V2: 2 V
P/R-WAVE RA LEAD (NATIVE): 12.5 MV
P/R-WAVE RA LEAD: 1.9 MV
THRESHOLD MS RA LEAD (NATIVE): 0.5 MS
THRESHOLD MS RA LEAD: 0.5 MS
THRESHOLD V RA LEAD (NATIVE): 0.6 V
THRESHOLD V RA LEAD: 0.6 V

## 2021-06-03 ENCOUNTER — TELEPHONE (OUTPATIENT)
Dept: CARDIOLOGY | Facility: HOSPITAL | Age: 81
End: 2021-06-03

## 2021-06-11 ENCOUNTER — PES CALL (OUTPATIENT)
Dept: ADMINISTRATIVE | Facility: CLINIC | Age: 81
End: 2021-06-11

## 2021-08-19 ENCOUNTER — PES CALL (OUTPATIENT)
Dept: ADMINISTRATIVE | Facility: CLINIC | Age: 81
End: 2021-08-19

## 2021-09-21 ENCOUNTER — HOSPITAL ENCOUNTER (OUTPATIENT)
Dept: CARDIOLOGY | Facility: HOSPITAL | Age: 81
Discharge: HOME | End: 2021-09-21
Attending: NUCLEAR MEDICINE
Payer: MEDICARE

## 2021-09-21 ENCOUNTER — OFFICE VISIT (OUTPATIENT)
Dept: TRANSPLANT | Facility: CLINIC | Age: 81
End: 2021-09-21
Payer: MEDICARE

## 2021-09-21 VITALS
HEART RATE: 61 BPM | SYSTOLIC BLOOD PRESSURE: 130 MMHG | HEIGHT: 66 IN | BODY MASS INDEX: 31.78 KG/M2 | DIASTOLIC BLOOD PRESSURE: 70 MMHG | WEIGHT: 197.75 LBS | OXYGEN SATURATION: 99 %

## 2021-09-21 DIAGNOSIS — I42.8 NICM (NONISCHEMIC CARDIOMYOPATHY): ICD-10-CM

## 2021-09-21 DIAGNOSIS — I50.9 CHF (NYHA CLASS III, ACC/AHA STAGE C): ICD-10-CM

## 2021-09-21 DIAGNOSIS — Z95.810 AUTOMATIC IMPLANTABLE CARDIOVERTER-DEFIBRILLATOR IN SITU: ICD-10-CM

## 2021-09-21 DIAGNOSIS — I50.9 CHF (NYHA CLASS III, ACC/AHA STAGE C): Primary | Chronic | ICD-10-CM

## 2021-09-21 DIAGNOSIS — I10 ESSENTIAL HYPERTENSION: ICD-10-CM

## 2021-09-21 PROCEDURE — 1159F PR MEDICATION LIST DOCUMENTED IN MEDICAL RECORD: ICD-10-PCS | Mod: HCNC,CPTII,S$GLB, | Performed by: NUCLEAR MEDICINE

## 2021-09-21 PROCEDURE — 3075F PR MOST RECENT SYSTOLIC BLOOD PRESS GE 130-139MM HG: ICD-10-PCS | Mod: HCNC,CPTII,S$GLB, | Performed by: NUCLEAR MEDICINE

## 2021-09-21 PROCEDURE — 3078F PR MOST RECENT DIASTOLIC BLOOD PRESSURE < 80 MM HG: ICD-10-PCS | Mod: HCNC,CPTII,S$GLB, | Performed by: NUCLEAR MEDICINE

## 2021-09-21 PROCEDURE — 99999 PR PBB SHADOW E&M-EST. PATIENT-LVL III: ICD-10-PCS | Mod: PBBFAC,HCNC,, | Performed by: NUCLEAR MEDICINE

## 2021-09-21 PROCEDURE — 99499 UNLISTED E&M SERVICE: CPT | Mod: HCNC,S$GLB,, | Performed by: NUCLEAR MEDICINE

## 2021-09-21 PROCEDURE — 93283 CARDIAC DEVICE CHECK - IN CLINIC & HOSPITAL: ICD-10-PCS | Mod: 26,HCNC,, | Performed by: INTERNAL MEDICINE

## 2021-09-21 PROCEDURE — 1160F RVW MEDS BY RX/DR IN RCRD: CPT | Mod: HCNC,CPTII,S$GLB, | Performed by: NUCLEAR MEDICINE

## 2021-09-21 PROCEDURE — 3078F DIAST BP <80 MM HG: CPT | Mod: HCNC,CPTII,S$GLB, | Performed by: NUCLEAR MEDICINE

## 2021-09-21 PROCEDURE — 3075F SYST BP GE 130 - 139MM HG: CPT | Mod: HCNC,CPTII,S$GLB, | Performed by: NUCLEAR MEDICINE

## 2021-09-21 PROCEDURE — 99214 OFFICE O/P EST MOD 30 MIN: CPT | Mod: HCNC,S$GLB,, | Performed by: NUCLEAR MEDICINE

## 2021-09-21 PROCEDURE — 99999 PR PBB SHADOW E&M-EST. PATIENT-LVL III: CPT | Mod: PBBFAC,HCNC,, | Performed by: NUCLEAR MEDICINE

## 2021-09-21 PROCEDURE — 99499 RISK ADDL DX/OHS AUDIT: ICD-10-PCS | Mod: HCNC,S$GLB,, | Performed by: NUCLEAR MEDICINE

## 2021-09-21 PROCEDURE — 1159F MED LIST DOCD IN RCRD: CPT | Mod: HCNC,CPTII,S$GLB, | Performed by: NUCLEAR MEDICINE

## 2021-09-21 PROCEDURE — 1160F PR REVIEW ALL MEDS BY PRESCRIBER/CLIN PHARMACIST DOCUMENTED: ICD-10-PCS | Mod: HCNC,CPTII,S$GLB, | Performed by: NUCLEAR MEDICINE

## 2021-09-21 PROCEDURE — 93283 PRGRMG EVAL IMPLANTABLE DFB: CPT | Mod: 26,HCNC,, | Performed by: INTERNAL MEDICINE

## 2021-09-21 PROCEDURE — 93283 PRGRMG EVAL IMPLANTABLE DFB: CPT | Mod: HCNC

## 2021-09-21 PROCEDURE — 99214 PR OFFICE/OUTPT VISIT, EST, LEVL IV, 30-39 MIN: ICD-10-PCS | Mod: HCNC,S$GLB,, | Performed by: NUCLEAR MEDICINE

## 2021-09-21 RX ORDER — LOSARTAN POTASSIUM 50 MG/1
50 TABLET ORAL DAILY
Qty: 90 TABLET | Refills: 3 | Status: SHIPPED | OUTPATIENT
Start: 2021-09-21 | End: 2022-03-24 | Stop reason: SDUPTHER

## 2021-09-24 LAB
AV DELAY - LONGEST: 300 MSEC
AV DELAY - SHORTEST: 190 MSEC
CHARGE TIME (SEC): 11.1 SEC
HV IMPEDANCE (OHM): 69 OHM
IMPEDANCE RA LEAD (NATIVE): 783 OHMS
IMPEDANCE RA LEAD: 751 OHMS
OHS CV DC PP MS1: 0.5 MS
OHS CV DC PP MS2: 0.5 MS
OHS CV DC PP V1: 2 V
OHS CV DC PP V2: 2 V
P/R-WAVE RA LEAD (NATIVE): 8.5 MV
P/R-WAVE RA LEAD: 1.3 MV
THRESHOLD MS RA LEAD (NATIVE): 0.5 MS
THRESHOLD MS RA LEAD: 0.5 MS
THRESHOLD V RA LEAD (NATIVE): 0.6 V
THRESHOLD V RA LEAD: 0.6 V

## 2021-10-09 ENCOUNTER — IMMUNIZATION (OUTPATIENT)
Dept: INTERNAL MEDICINE | Facility: CLINIC | Age: 81
End: 2021-10-09
Payer: MEDICARE

## 2021-10-09 PROCEDURE — 90694 FLU VACCINE - QUADRIVALENT - ADJUVANTED: ICD-10-PCS | Mod: HCNC,S$GLB,, | Performed by: FAMILY MEDICINE

## 2021-10-09 PROCEDURE — G0008 ADMIN INFLUENZA VIRUS VAC: HCPCS | Mod: HCNC,S$GLB,, | Performed by: FAMILY MEDICINE

## 2021-10-09 PROCEDURE — 90694 VACC AIIV4 NO PRSRV 0.5ML IM: CPT | Mod: HCNC,S$GLB,, | Performed by: FAMILY MEDICINE

## 2021-10-09 PROCEDURE — G0008 FLU VACCINE - QUADRIVALENT - ADJUVANTED: ICD-10-PCS | Mod: HCNC,S$GLB,, | Performed by: FAMILY MEDICINE

## 2021-10-20 ENCOUNTER — OFFICE VISIT (OUTPATIENT)
Dept: UROLOGY | Facility: CLINIC | Age: 81
End: 2021-10-20
Payer: MEDICARE

## 2021-10-20 VITALS
SYSTOLIC BLOOD PRESSURE: 158 MMHG | HEIGHT: 66 IN | DIASTOLIC BLOOD PRESSURE: 79 MMHG | WEIGHT: 199.19 LBS | BODY MASS INDEX: 32.01 KG/M2 | HEART RATE: 69 BPM

## 2021-10-20 DIAGNOSIS — N30.00 ACUTE CYSTITIS WITHOUT HEMATURIA: ICD-10-CM

## 2021-10-20 DIAGNOSIS — C61 PROSTATE CANCER: Primary | ICD-10-CM

## 2021-10-20 DIAGNOSIS — N39.3 STRESS INCONTINENCE: ICD-10-CM

## 2021-10-20 PROCEDURE — 3078F PR MOST RECENT DIASTOLIC BLOOD PRESSURE < 80 MM HG: ICD-10-PCS | Mod: HCNC,CPTII,S$GLB, | Performed by: UROLOGY

## 2021-10-20 PROCEDURE — 1126F PR PAIN SEVERITY QUANTIFIED, NO PAIN PRESENT: ICD-10-PCS | Mod: HCNC,CPTII,S$GLB, | Performed by: UROLOGY

## 2021-10-20 PROCEDURE — 3288F FALL RISK ASSESSMENT DOCD: CPT | Mod: HCNC,CPTII,S$GLB, | Performed by: UROLOGY

## 2021-10-20 PROCEDURE — 1160F RVW MEDS BY RX/DR IN RCRD: CPT | Mod: HCNC,CPTII,S$GLB, | Performed by: UROLOGY

## 2021-10-20 PROCEDURE — 1101F PT FALLS ASSESS-DOCD LE1/YR: CPT | Mod: HCNC,CPTII,S$GLB, | Performed by: UROLOGY

## 2021-10-20 PROCEDURE — 99999 PR PBB SHADOW E&M-EST. PATIENT-LVL III: ICD-10-PCS | Mod: PBBFAC,HCNC,, | Performed by: UROLOGY

## 2021-10-20 PROCEDURE — 99999 PR PBB SHADOW E&M-EST. PATIENT-LVL III: CPT | Mod: PBBFAC,HCNC,, | Performed by: UROLOGY

## 2021-10-20 PROCEDURE — 3078F DIAST BP <80 MM HG: CPT | Mod: HCNC,CPTII,S$GLB, | Performed by: UROLOGY

## 2021-10-20 PROCEDURE — 1101F PR PT FALLS ASSESS DOC 0-1 FALLS W/OUT INJ PAST YR: ICD-10-PCS | Mod: HCNC,CPTII,S$GLB, | Performed by: UROLOGY

## 2021-10-20 PROCEDURE — 3077F SYST BP >= 140 MM HG: CPT | Mod: HCNC,CPTII,S$GLB, | Performed by: UROLOGY

## 2021-10-20 PROCEDURE — 99214 PR OFFICE/OUTPT VISIT, EST, LEVL IV, 30-39 MIN: ICD-10-PCS | Mod: HCNC,S$GLB,, | Performed by: UROLOGY

## 2021-10-20 PROCEDURE — 1159F MED LIST DOCD IN RCRD: CPT | Mod: HCNC,CPTII,S$GLB, | Performed by: UROLOGY

## 2021-10-20 PROCEDURE — 99214 OFFICE O/P EST MOD 30 MIN: CPT | Mod: HCNC,S$GLB,, | Performed by: UROLOGY

## 2021-10-20 PROCEDURE — 3288F PR FALLS RISK ASSESSMENT DOCUMENTED: ICD-10-PCS | Mod: HCNC,CPTII,S$GLB, | Performed by: UROLOGY

## 2021-10-20 PROCEDURE — 1159F PR MEDICATION LIST DOCUMENTED IN MEDICAL RECORD: ICD-10-PCS | Mod: HCNC,CPTII,S$GLB, | Performed by: UROLOGY

## 2021-10-20 PROCEDURE — 3077F PR MOST RECENT SYSTOLIC BLOOD PRESSURE >= 140 MM HG: ICD-10-PCS | Mod: HCNC,CPTII,S$GLB, | Performed by: UROLOGY

## 2021-10-20 PROCEDURE — 1126F AMNT PAIN NOTED NONE PRSNT: CPT | Mod: HCNC,CPTII,S$GLB, | Performed by: UROLOGY

## 2021-10-20 PROCEDURE — 1160F PR REVIEW ALL MEDS BY PRESCRIBER/CLIN PHARMACIST DOCUMENTED: ICD-10-PCS | Mod: HCNC,CPTII,S$GLB, | Performed by: UROLOGY

## 2021-10-29 ENCOUNTER — IMMUNIZATION (OUTPATIENT)
Dept: PRIMARY CARE CLINIC | Facility: CLINIC | Age: 81
End: 2021-10-29
Payer: MEDICARE

## 2021-10-29 DIAGNOSIS — Z23 NEED FOR VACCINATION: Primary | ICD-10-CM

## 2021-10-29 PROCEDURE — 0003A COVID-19, MRNA, LNP-S, PF, 30 MCG/0.3 ML DOSE VACCINE: CPT | Mod: CV19,PBBFAC | Performed by: FAMILY MEDICINE

## 2021-10-29 PROCEDURE — 91300 COVID-19, MRNA, LNP-S, PF, 30 MCG/0.3 ML DOSE VACCINE: CPT | Mod: PBBFAC | Performed by: FAMILY MEDICINE

## 2021-12-14 ENCOUNTER — PATIENT OUTREACH (OUTPATIENT)
Dept: ADMINISTRATIVE | Facility: HOSPITAL | Age: 81
End: 2021-12-14
Payer: MEDICARE

## 2021-12-17 ENCOUNTER — LAB VISIT (OUTPATIENT)
Dept: LAB | Facility: HOSPITAL | Age: 81
End: 2021-12-17
Attending: NUCLEAR MEDICINE
Payer: MEDICARE

## 2021-12-17 ENCOUNTER — PES CALL (OUTPATIENT)
Dept: ADMINISTRATIVE | Facility: CLINIC | Age: 81
End: 2021-12-17
Payer: MEDICARE

## 2021-12-17 DIAGNOSIS — I50.9 CHF (NYHA CLASS III, ACC/AHA STAGE C): Chronic | ICD-10-CM

## 2021-12-17 DIAGNOSIS — I10 ESSENTIAL HYPERTENSION: ICD-10-CM

## 2021-12-17 LAB
ANION GAP SERPL CALC-SCNC: 10 MMOL/L (ref 8–16)
BUN SERPL-MCNC: 21 MG/DL (ref 8–23)
CALCIUM SERPL-MCNC: 10.1 MG/DL (ref 8.7–10.5)
CHLORIDE SERPL-SCNC: 104 MMOL/L (ref 95–110)
CO2 SERPL-SCNC: 23 MMOL/L (ref 23–29)
CREAT SERPL-MCNC: 1.6 MG/DL (ref 0.5–1.4)
EST. GFR  (AFRICAN AMERICAN): 46 ML/MIN/1.73 M^2
EST. GFR  (NON AFRICAN AMERICAN): 39.8 ML/MIN/1.73 M^2
GLUCOSE SERPL-MCNC: 101 MG/DL (ref 70–110)
POTASSIUM SERPL-SCNC: 4.6 MMOL/L (ref 3.5–5.1)
SODIUM SERPL-SCNC: 137 MMOL/L (ref 136–145)

## 2021-12-17 PROCEDURE — 80048 BASIC METABOLIC PNL TOTAL CA: CPT | Mod: HCNC | Performed by: NUCLEAR MEDICINE

## 2021-12-17 PROCEDURE — 36415 COLL VENOUS BLD VENIPUNCTURE: CPT | Mod: HCNC | Performed by: NUCLEAR MEDICINE

## 2021-12-21 ENCOUNTER — HOSPITAL ENCOUNTER (OUTPATIENT)
Dept: CARDIOLOGY | Facility: HOSPITAL | Age: 81
Discharge: HOME | End: 2021-12-21
Attending: NUCLEAR MEDICINE
Payer: MEDICARE

## 2021-12-21 ENCOUNTER — OFFICE VISIT (OUTPATIENT)
Dept: TRANSPLANT | Facility: CLINIC | Age: 81
End: 2021-12-21
Payer: MEDICARE

## 2021-12-21 VITALS
WEIGHT: 195.75 LBS | BODY MASS INDEX: 31.46 KG/M2 | SYSTOLIC BLOOD PRESSURE: 125 MMHG | DIASTOLIC BLOOD PRESSURE: 70 MMHG | HEART RATE: 73 BPM | HEIGHT: 66 IN | OXYGEN SATURATION: 95 %

## 2021-12-21 DIAGNOSIS — Z95.810 AUTOMATIC IMPLANTABLE CARDIOVERTER-DEFIBRILLATOR IN SITU: ICD-10-CM

## 2021-12-21 DIAGNOSIS — I42.8 NICM (NONISCHEMIC CARDIOMYOPATHY): ICD-10-CM

## 2021-12-21 DIAGNOSIS — Z95.810 ICD (IMPLANTABLE CARDIOVERTER-DEFIBRILLATOR) IN PLACE: ICD-10-CM

## 2021-12-21 DIAGNOSIS — I25.5 ISCHEMIC CARDIOMYOPATHY: ICD-10-CM

## 2021-12-21 DIAGNOSIS — I50.9 CHF (NYHA CLASS III, ACC/AHA STAGE C): ICD-10-CM

## 2021-12-21 DIAGNOSIS — I50.9 CHF (NYHA CLASS III, ACC/AHA STAGE C): Primary | Chronic | ICD-10-CM

## 2021-12-21 PROCEDURE — 93283 PRGRMG EVAL IMPLANTABLE DFB: CPT | Mod: HCNC

## 2021-12-21 PROCEDURE — 99999 PR PBB SHADOW E&M-EST. PATIENT-LVL IV: ICD-10-PCS | Mod: PBBFAC,HCNC,, | Performed by: NUCLEAR MEDICINE

## 2021-12-21 PROCEDURE — 99214 PR OFFICE/OUTPT VISIT, EST, LEVL IV, 30-39 MIN: ICD-10-PCS | Mod: HCNC,S$GLB,, | Performed by: NUCLEAR MEDICINE

## 2021-12-21 PROCEDURE — 93283 PRGRMG EVAL IMPLANTABLE DFB: CPT | Mod: 26,HCNC,, | Performed by: NUCLEAR MEDICINE

## 2021-12-21 PROCEDURE — 99999 PR PBB SHADOW E&M-EST. PATIENT-LVL IV: CPT | Mod: PBBFAC,HCNC,, | Performed by: NUCLEAR MEDICINE

## 2021-12-21 PROCEDURE — 93283 CARDIAC DEVICE CHECK - IN CLINIC & HOSPITAL: ICD-10-PCS | Mod: 26,HCNC,, | Performed by: NUCLEAR MEDICINE

## 2021-12-21 PROCEDURE — 99214 OFFICE O/P EST MOD 30 MIN: CPT | Mod: PBBFAC,HCNC | Performed by: NUCLEAR MEDICINE

## 2021-12-21 PROCEDURE — 99214 OFFICE O/P EST MOD 30 MIN: CPT | Mod: HCNC,S$GLB,, | Performed by: NUCLEAR MEDICINE

## 2022-01-06 ENCOUNTER — PATIENT OUTREACH (OUTPATIENT)
Dept: ADMINISTRATIVE | Facility: HOSPITAL | Age: 82
End: 2022-01-06
Payer: MEDICARE

## 2022-01-06 NOTE — PROGRESS NOTES
Working HTN Report.    Requested updated bp reading. States he does not nor does he have anyone else check his bp.  Advised due for annual exam. He requested I contact Bela to schedule appt. [email or web address] requesting she call to schedule appt.

## 2022-01-13 ENCOUNTER — OFFICE VISIT (OUTPATIENT)
Dept: HOME HEALTH SERVICES | Facility: CLINIC | Age: 82
End: 2022-01-13
Payer: MEDICARE

## 2022-01-13 VITALS
SYSTOLIC BLOOD PRESSURE: 121 MMHG | OXYGEN SATURATION: 95 % | TEMPERATURE: 98 F | WEIGHT: 195 LBS | BODY MASS INDEX: 31.34 KG/M2 | HEART RATE: 70 BPM | DIASTOLIC BLOOD PRESSURE: 68 MMHG | HEIGHT: 66 IN

## 2022-01-13 DIAGNOSIS — Z95.810 AUTOMATIC IMPLANTABLE CARDIOVERTER-DEFIBRILLATOR IN SITU: ICD-10-CM

## 2022-01-13 DIAGNOSIS — E78.5 HYPERLIPIDEMIA, UNSPECIFIED HYPERLIPIDEMIA TYPE: ICD-10-CM

## 2022-01-13 DIAGNOSIS — I50.9 CHF (NYHA CLASS III, ACC/AHA STAGE C): Chronic | ICD-10-CM

## 2022-01-13 DIAGNOSIS — Z85.46 HISTORY OF PROSTATE CANCER: ICD-10-CM

## 2022-01-13 DIAGNOSIS — H40.9 GLAUCOMA, UNSPECIFIED GLAUCOMA TYPE, UNSPECIFIED LATERALITY: ICD-10-CM

## 2022-01-13 DIAGNOSIS — I42.8 NICM (NONISCHEMIC CARDIOMYOPATHY): ICD-10-CM

## 2022-01-13 DIAGNOSIS — I10 ESSENTIAL HYPERTENSION: ICD-10-CM

## 2022-01-13 DIAGNOSIS — H54.3 BLINDNESS OF BOTH EYES: ICD-10-CM

## 2022-01-13 DIAGNOSIS — D47.2 MGUS (MONOCLONAL GAMMOPATHY OF UNKNOWN SIGNIFICANCE): ICD-10-CM

## 2022-01-13 DIAGNOSIS — Z00.00 ENCOUNTER FOR PREVENTIVE HEALTH EXAMINATION: Primary | ICD-10-CM

## 2022-01-13 PROCEDURE — 1160F PR REVIEW ALL MEDS BY PRESCRIBER/CLIN PHARMACIST DOCUMENTED: ICD-10-PCS | Mod: CPTII,S$GLB,, | Performed by: NURSE PRACTITIONER

## 2022-01-13 PROCEDURE — 1101F PR PT FALLS ASSESS DOC 0-1 FALLS W/OUT INJ PAST YR: ICD-10-PCS | Mod: CPTII,S$GLB,, | Performed by: NURSE PRACTITIONER

## 2022-01-13 PROCEDURE — G0439 PR MEDICARE ANNUAL WELLNESS SUBSEQUENT VISIT: ICD-10-PCS | Mod: S$GLB,,, | Performed by: NURSE PRACTITIONER

## 2022-01-13 PROCEDURE — 3288F FALL RISK ASSESSMENT DOCD: CPT | Mod: CPTII,S$GLB,, | Performed by: NURSE PRACTITIONER

## 2022-01-13 PROCEDURE — G9919 PR SCREENING AND POSITIVE: ICD-10-PCS | Mod: CPTII,S$GLB,, | Performed by: NURSE PRACTITIONER

## 2022-01-13 PROCEDURE — 3078F DIAST BP <80 MM HG: CPT | Mod: CPTII,S$GLB,, | Performed by: NURSE PRACTITIONER

## 2022-01-13 PROCEDURE — G0439 PPPS, SUBSEQ VISIT: HCPCS | Mod: S$GLB,,, | Performed by: NURSE PRACTITIONER

## 2022-01-13 PROCEDURE — 3074F PR MOST RECENT SYSTOLIC BLOOD PRESSURE < 130 MM HG: ICD-10-PCS | Mod: CPTII,S$GLB,, | Performed by: NURSE PRACTITIONER

## 2022-01-13 PROCEDURE — 3074F SYST BP LT 130 MM HG: CPT | Mod: CPTII,S$GLB,, | Performed by: NURSE PRACTITIONER

## 2022-01-13 PROCEDURE — G9919 SCRN ND POS ND PROV OF REC: HCPCS | Mod: CPTII,S$GLB,, | Performed by: NURSE PRACTITIONER

## 2022-01-13 PROCEDURE — 3288F PR FALLS RISK ASSESSMENT DOCUMENTED: ICD-10-PCS | Mod: CPTII,S$GLB,, | Performed by: NURSE PRACTITIONER

## 2022-01-13 PROCEDURE — 1159F MED LIST DOCD IN RCRD: CPT | Mod: CPTII,S$GLB,, | Performed by: NURSE PRACTITIONER

## 2022-01-13 PROCEDURE — 1101F PT FALLS ASSESS-DOCD LE1/YR: CPT | Mod: CPTII,S$GLB,, | Performed by: NURSE PRACTITIONER

## 2022-01-13 PROCEDURE — 3078F PR MOST RECENT DIASTOLIC BLOOD PRESSURE < 80 MM HG: ICD-10-PCS | Mod: CPTII,S$GLB,, | Performed by: NURSE PRACTITIONER

## 2022-01-13 PROCEDURE — 1160F RVW MEDS BY RX/DR IN RCRD: CPT | Mod: CPTII,S$GLB,, | Performed by: NURSE PRACTITIONER

## 2022-01-13 PROCEDURE — 1159F PR MEDICATION LIST DOCUMENTED IN MEDICAL RECORD: ICD-10-PCS | Mod: CPTII,S$GLB,, | Performed by: NURSE PRACTITIONER

## 2022-01-17 PROBLEM — H54.7 BLIND: Status: ACTIVE | Noted: 2022-01-17

## 2022-01-17 NOTE — PROGRESS NOTES
"  Curtis Landry presented for a  Medicare AWV and comprehensive Health Risk Assessment today. The following components were reviewed and updated:    · Medical history  · Family History  · Social history  · Allergies and Current Medications  · Health Risk Assessment  · Health Maintenance  · Care Team     Patient screened moderate and/or high risk for one or more social determinants of health (SDOH). Patient connected to community resources through the ED Navigator.      ** See Completed Assessments for Annual Wellness Visit within the encounter summary.**         The following assessments were completed:  · Living Situation  · CAGE  · Depression Screening  · Timed Get Up and Go  · Whisper Test  · Cognitive Function Screening - not performed, blind  · Nutrition Screening  · ADL Screening  · PAQ Screening        Vitals:    01/13/22 0915   BP: 121/68   Pulse: 70   Temp: 97.8 °F (36.6 °C)   SpO2: 95%   Weight: 88.5 kg (195 lb)   Height: 5' 6" (1.676 m)     Body mass index is 31.47 kg/m².  Physical Exam  Vitals reviewed.   Constitutional:       Appearance: He is well-developed.   HENT:      Head: Normocephalic.   Eyes:      Comments: blind   Cardiovascular:      Rate and Rhythm: Normal rate and regular rhythm.      Pulses: Intact distal pulses.      Heart sounds: Normal heart sounds.   Pulmonary:      Effort: Pulmonary effort is normal.      Breath sounds: Normal breath sounds.   Abdominal:      General: Bowel sounds are normal. There is no distension.      Palpations: Abdomen is soft.      Tenderness: There is no abdominal tenderness.   Musculoskeletal:         General: No edema. Normal range of motion.      Cervical back: Normal range of motion.      Right lower leg: No edema.      Left lower leg: No edema.   Skin:     General: Skin is warm and dry.   Neurological:      Mental Status: He is alert and oriented to person, place, and time.   Psychiatric:         Mood and Affect: Mood and affect normal.         Behavior: " Behavior normal.               Diagnoses and health risks identified today and associated recommendations/orders:    1. Encounter for preventive health examination  - Above assessments completed. Preventive measures and health maintenance reviewed with patient.  -discussed shingles and tetanus vaccines.  Patient declines at this time.    2. CHF (NYHA class III, ACC/AHA stage C)  Chronic, followed by Transplant  -no acute issues    3. NICM (nonischemic cardiomyopathy)  Stable, followed by Transplant    4. Automatic implantable cardioverter-defibrillator in situ    5. History of prostate cancer  -Surveillance by Hem/Onc    6. MGUS (monoclonal gammopathy of unknown significance)  Stable, followed by Hem/Onc    7. Essential hypertension  Stable, followed by PCP    8. Hyperlipidemia, unspecified hyperlipidemia type  Stable, followed by PCP    9. Glaucoma, unspecified glaucoma type, unspecified laterality  Stable, on eye gtts, followed by Ophthalmology    10. Blindness of both eyes  -no recent falls, lives alone, discussed safety and fall precautions  -Has help with meals, transportation, medications and IADLs from cousin and Religious members.    Provided Curtis with a 5-10 year written screening schedule and personal prevention plan. Recommendations were developed using the USPSTF age appropriate recommendations. Education, counseling, and referrals were provided as needed. After Visit Summary printed and given to patient which includes a list of additional screenings\tests needed.    Follow up in about 1 year (around 1/13/2023) for your next annual wellness visit.    Mayuri Cadena NP    I offered to discuss advanced care planning, including how to pick a person who would make decisions for you if you were unable to make them for yourself, called a health care power of , and what kind of decisions you might make such as use of life sustaining treatments such as ventilators and tube feeding when faced with a life  limiting illness recorded on a living will that they will need to know. (How you want to be cared for as you near the end of your natural life)     X Patient is interested in learning more about how to make advanced directives.  I provided them paperwork and offered to discuss this with them.

## 2022-01-17 NOTE — PATIENT INSTRUCTIONS
Counseling and Referral of Other Preventative  (Italic type indicates deductible and co-insurance are waived)    Patient Name: Curtis Landry  Today's Date: 1/17/2022    Health Maintenance       Date Due Completion Date    TETANUS VACCINE Never done ---    Shingles Vaccine (2 of 3) 12/05/2014 10/10/2014    Lipid Panel 10/02/2021 10/2/2020    PROSTATE-SPECIFIC ANTIGEN 05/11/2022 5/11/2021        No orders of the defined types were placed in this encounter.    The following information is provided to all patients.  This information is to help you find resources for any of the problems found today that may be affecting your health:                Living healthy guide: www.[facility].[place].[facility]      Understanding Diabetes: www.diabetes.org      Eating healthy: www.cdc.gov/healthyweight      [facility] home safety checklist: www.cdc.gov/steadi/patient.html      Agency on Aging: www.goea.[place].[facility]      Alcoholics anonymous (AA): www.aa.org      Physical Activity: www.rachelle.nih.gov/bb9seed      Tobacco use: www.quitwithusla.org

## 2022-01-18 ENCOUNTER — TELEPHONE (OUTPATIENT)
Dept: INTERNAL MEDICINE | Facility: CLINIC | Age: 82
End: 2022-01-18
Payer: MEDICARE

## 2022-01-18 ENCOUNTER — PATIENT OUTREACH (OUTPATIENT)
Dept: ADMINISTRATIVE | Facility: OTHER | Age: 82
End: 2022-01-18
Payer: MEDICARE

## 2022-01-18 DIAGNOSIS — I50.9 CHF (NYHA CLASS III, ACC/AHA STAGE C): Primary | ICD-10-CM

## 2022-01-18 DIAGNOSIS — H54.3 BLINDNESS OF BOTH EYES: ICD-10-CM

## 2022-01-18 NOTE — TELEPHONE ENCOUNTER
Patient daughter wants someone to come in and care for patient due to he can no longer see   Please advise

## 2022-01-18 NOTE — TELEPHONE ENCOUNTER
----- Message from Tabitha Sharpe sent at 1/18/2022 10:06 AM CST -----  Contact: Rosa/Daughter  Rosa would like a call back at   in regard to getting some orders for home health assistance for the patient.    Thanks

## 2022-01-19 PROCEDURE — G0180 MD CERTIFICATION HHA PATIENT: HCPCS | Mod: ,,, | Performed by: PHYSICIAN ASSISTANT

## 2022-01-19 PROCEDURE — G0180 PR HOME HEALTH MD CERTIFICATION: ICD-10-PCS | Mod: ,,, | Performed by: PHYSICIAN ASSISTANT

## 2022-01-27 ENCOUNTER — EXTERNAL HOME HEALTH (OUTPATIENT)
Dept: HOME HEALTH SERVICES | Facility: HOSPITAL | Age: 82
End: 2022-01-27
Payer: MEDICARE

## 2022-02-17 ENCOUNTER — OFFICE VISIT (OUTPATIENT)
Dept: INTERNAL MEDICINE | Facility: CLINIC | Age: 82
End: 2022-02-17
Payer: MEDICARE

## 2022-02-17 VITALS
TEMPERATURE: 98 F | HEIGHT: 66 IN | HEART RATE: 77 BPM | DIASTOLIC BLOOD PRESSURE: 72 MMHG | WEIGHT: 190.5 LBS | OXYGEN SATURATION: 98 % | BODY MASS INDEX: 30.62 KG/M2 | SYSTOLIC BLOOD PRESSURE: 134 MMHG

## 2022-02-17 DIAGNOSIS — Z79.899 ENCOUNTER FOR LONG-TERM (CURRENT) DRUG USE: ICD-10-CM

## 2022-02-17 DIAGNOSIS — Z00.00 ROUTINE GENERAL MEDICAL EXAMINATION AT A HEALTH CARE FACILITY: Primary | ICD-10-CM

## 2022-02-17 DIAGNOSIS — Z95.810 AUTOMATIC IMPLANTABLE CARDIOVERTER-DEFIBRILLATOR IN SITU: ICD-10-CM

## 2022-02-17 DIAGNOSIS — D47.2 SMOLDERING MYELOMA: ICD-10-CM

## 2022-02-17 DIAGNOSIS — I50.9 CHF (NYHA CLASS III, ACC/AHA STAGE C): ICD-10-CM

## 2022-02-17 DIAGNOSIS — D63.8 ANEMIA IN CHRONIC ILLNESS: ICD-10-CM

## 2022-02-17 DIAGNOSIS — E78.5 HYPERLIPIDEMIA, UNSPECIFIED HYPERLIPIDEMIA TYPE: ICD-10-CM

## 2022-02-17 DIAGNOSIS — N18.31 CHRONIC KIDNEY DISEASE, STAGE 3A: ICD-10-CM

## 2022-02-17 DIAGNOSIS — I42.8 NICM (NONISCHEMIC CARDIOMYOPATHY): ICD-10-CM

## 2022-02-17 DIAGNOSIS — H43.11 VITREOUS HEMORRHAGE, RIGHT EYE: ICD-10-CM

## 2022-02-17 DIAGNOSIS — Z85.46 HISTORY OF PROSTATE CANCER: ICD-10-CM

## 2022-02-17 DIAGNOSIS — I10 ESSENTIAL HYPERTENSION: ICD-10-CM

## 2022-02-17 DIAGNOSIS — E66.9 OBESITY (BMI 30.0-34.9): ICD-10-CM

## 2022-02-17 PROCEDURE — 1160F PR REVIEW ALL MEDS BY PRESCRIBER/CLIN PHARMACIST DOCUMENTED: ICD-10-PCS | Mod: HCNC,CPTII,S$GLB, | Performed by: PHYSICIAN ASSISTANT

## 2022-02-17 PROCEDURE — 3078F PR MOST RECENT DIASTOLIC BLOOD PRESSURE < 80 MM HG: ICD-10-PCS | Mod: HCNC,CPTII,S$GLB, | Performed by: PHYSICIAN ASSISTANT

## 2022-02-17 PROCEDURE — 99499 UNLISTED E&M SERVICE: CPT | Mod: HCNC,S$GLB,, | Performed by: PHYSICIAN ASSISTANT

## 2022-02-17 PROCEDURE — 1101F PR PT FALLS ASSESS DOC 0-1 FALLS W/OUT INJ PAST YR: ICD-10-PCS | Mod: HCNC,CPTII,S$GLB, | Performed by: PHYSICIAN ASSISTANT

## 2022-02-17 PROCEDURE — 99397 PR PREVENTIVE VISIT,EST,65 & OVER: ICD-10-PCS | Mod: HCNC,S$GLB,, | Performed by: PHYSICIAN ASSISTANT

## 2022-02-17 PROCEDURE — 3075F SYST BP GE 130 - 139MM HG: CPT | Mod: HCNC,CPTII,S$GLB, | Performed by: PHYSICIAN ASSISTANT

## 2022-02-17 PROCEDURE — 1101F PT FALLS ASSESS-DOCD LE1/YR: CPT | Mod: HCNC,CPTII,S$GLB, | Performed by: PHYSICIAN ASSISTANT

## 2022-02-17 PROCEDURE — 3288F FALL RISK ASSESSMENT DOCD: CPT | Mod: HCNC,CPTII,S$GLB, | Performed by: PHYSICIAN ASSISTANT

## 2022-02-17 PROCEDURE — 3288F PR FALLS RISK ASSESSMENT DOCUMENTED: ICD-10-PCS | Mod: HCNC,CPTII,S$GLB, | Performed by: PHYSICIAN ASSISTANT

## 2022-02-17 PROCEDURE — 1160F RVW MEDS BY RX/DR IN RCRD: CPT | Mod: HCNC,CPTII,S$GLB, | Performed by: PHYSICIAN ASSISTANT

## 2022-02-17 PROCEDURE — 3078F DIAST BP <80 MM HG: CPT | Mod: HCNC,CPTII,S$GLB, | Performed by: PHYSICIAN ASSISTANT

## 2022-02-17 PROCEDURE — 1159F PR MEDICATION LIST DOCUMENTED IN MEDICAL RECORD: ICD-10-PCS | Mod: HCNC,CPTII,S$GLB, | Performed by: PHYSICIAN ASSISTANT

## 2022-02-17 PROCEDURE — 99999 PR PBB SHADOW E&M-EST. PATIENT-LVL IV: ICD-10-PCS | Mod: PBBFAC,HCNC,, | Performed by: PHYSICIAN ASSISTANT

## 2022-02-17 PROCEDURE — 99999 PR PBB SHADOW E&M-EST. PATIENT-LVL IV: CPT | Mod: PBBFAC,HCNC,, | Performed by: PHYSICIAN ASSISTANT

## 2022-02-17 PROCEDURE — 1126F PR PAIN SEVERITY QUANTIFIED, NO PAIN PRESENT: ICD-10-PCS | Mod: HCNC,CPTII,S$GLB, | Performed by: PHYSICIAN ASSISTANT

## 2022-02-17 PROCEDURE — 1126F AMNT PAIN NOTED NONE PRSNT: CPT | Mod: HCNC,CPTII,S$GLB, | Performed by: PHYSICIAN ASSISTANT

## 2022-02-17 PROCEDURE — 3075F PR MOST RECENT SYSTOLIC BLOOD PRESS GE 130-139MM HG: ICD-10-PCS | Mod: HCNC,CPTII,S$GLB, | Performed by: PHYSICIAN ASSISTANT

## 2022-02-17 PROCEDURE — 99499 RISK ADDL DX/OHS AUDIT: ICD-10-PCS | Mod: HCNC,S$GLB,, | Performed by: PHYSICIAN ASSISTANT

## 2022-02-17 PROCEDURE — 1159F MED LIST DOCD IN RCRD: CPT | Mod: HCNC,CPTII,S$GLB, | Performed by: PHYSICIAN ASSISTANT

## 2022-02-17 PROCEDURE — 99397 PER PM REEVAL EST PAT 65+ YR: CPT | Mod: HCNC,S$GLB,, | Performed by: PHYSICIAN ASSISTANT

## 2022-02-17 NOTE — ASSESSMENT & PLAN NOTE
Wt Readings from Last 10 Encounters:   02/17/22 86.4 kg (190 lb 7.6 oz)   01/13/22 88.5 kg (195 lb)   12/21/21 88.8 kg (195 lb 12.3 oz)   10/20/21 90.3 kg (199 lb 2.6 oz)   09/21/21 89.7 kg (197 lb 12 oz)   05/11/21 91.8 kg (202 lb 6.1 oz)   05/11/21 91.7 kg (202 lb 2.6 oz)   02/22/21 89.6 kg (197 lb 8.5 oz)   01/18/21 84 kg (185 lb 3 oz)   11/13/20 87.3 kg (192 lb 7.4 oz)     Body mass index is 30.74 kg/m².

## 2022-02-17 NOTE — PROGRESS NOTES
Subjective:       Patient ID: Curtis Landry is a 81 y.o. male.    Chief Complaint: Annual Exam    Patient Care Team:  Liz Hall MD as PCP - General (Family Medicine)  Loree Zhang LPN as Care Coordinator (Internal Medicine)  Severo Boyd MD as Consulting Physician (Cardiology)  Curtis Paul IV, MD (Inactive) as Consulting Physician (Urology)  Víctor La MD (Inactive) as Consulting Physician (Hematology and Oncology)  Girish Iglesias MD as Consulting Physician (Ophthalmology)  Edd Yi OD (Optometry)    Patient presents to clinic today for annual physical exam.    Review of Systems   Constitutional: Negative for chills, fatigue, fever and unexpected weight change.   HENT: Positive for hearing loss ( chronic - unwilling to get hearing aids). Negative for congestion, dental problem, ear pain, rhinorrhea and trouble swallowing.    Eyes: Positive for visual disturbance ( chronic - macular degeneration). Negative for pain.   Respiratory: Positive for shortness of breath ( chronic - with exertion; hx of CHF). Negative for cough.    Cardiovascular: Negative for chest pain, palpitations and leg swelling.   Gastrointestinal: Positive for constipation ( occasional - chronic). Negative for abdominal distention, abdominal pain, diarrhea, nausea and vomiting.   Genitourinary: Negative for difficulty urinating, scrotal swelling and testicular pain.   Musculoskeletal: Negative for arthralgias and myalgias.   Skin: Negative for rash.   Neurological: Negative for dizziness, weakness, numbness and headaches.   Hematological: Negative for adenopathy. Does not bruise/bleed easily.   Psychiatric/Behavioral: Negative for dysphoric mood and sleep disturbance. The patient is not nervous/anxious.          Objective:      Physical Exam  Vitals and nursing note reviewed.   Constitutional:       General: He is not in acute distress.     Appearance: He is well-developed.   HENT:      Head:  Normocephalic and atraumatic.      Right Ear: Tympanic membrane, ear canal and external ear normal. Decreased hearing noted.      Left Ear: Tympanic membrane, ear canal and external ear normal. Decreased hearing noted.      Nose: Nose normal.      Mouth/Throat:      Lips: Pink.      Mouth: Mucous membranes are moist.      Pharynx: Oropharynx is clear. Uvula midline.   Eyes:      General: Lids are normal. No scleral icterus.     Conjunctiva/sclera: Conjunctivae normal.      Pupils: Pupils are equal, round, and reactive to light.   Neck:      Thyroid: No thyromegaly.   Cardiovascular:      Rate and Rhythm: Normal rate and regular rhythm.      Heart sounds: No murmur heard.  No friction rub. No gallop.    Pulmonary:      Effort: Pulmonary effort is normal.      Breath sounds: Normal breath sounds. No wheezing or rales.   Abdominal:      General: Bowel sounds are normal. There is no distension.      Palpations: Abdomen is soft. There is no mass.      Tenderness: There is no abdominal tenderness.   Musculoskeletal:         General: No tenderness. Normal range of motion.      Cervical back: Normal range of motion and neck supple.   Lymphadenopathy:      Cervical: No cervical adenopathy.   Skin:     General: Skin is warm and dry.      Findings: No rash.   Neurological:      Mental Status: He is alert.      Cranial Nerves: No cranial nerve deficit.      Gait: Gait normal.   Psychiatric:         Mood and Affect: Mood and affect normal.         Assessment:       1. Routine general medical examination at a health care facility    2. Smoldering myeloma    3. Vitreous hemorrhage, right eye    4. Chronic kidney disease, stage 3a    5. Essential hypertension    6. Obesity (BMI 30.0-34.9)    7. Anemia in chronic illness    8. History of prostate cancer    9. NICM (nonischemic cardiomyopathy)    10. Automatic implantable cardioverter-defibrillator in situ    11. CHF (NYHA class III, ACC/AHA stage C)    12. Hyperlipidemia,  unspecified hyperlipidemia type    13. Encounter for long-term (current) drug use        Plan:     Problem List Items Addressed This Visit        Ophtho    Vitreous hemorrhage, right eye    Overview     Followed by Ophthalmology            Cardiac/Vascular    CHF (NYHA class III, ACC/AHA stage C) (Chronic)    Overview     Followed by Cardiology         Hyperlipidemia    Current Assessment & Plan     Status pending labs         Relevant Orders    Comprehensive Metabolic Panel    Lipid Panel    Automatic implantable cardioverter-defibrillator in situ    Overview     Followed by Cardiology         NICM (nonischemic cardiomyopathy)    Overview     Followed by Cardiology         Essential hypertension    Current Assessment & Plan     /72, controlled, continue carvedilol, losartan         Relevant Orders    CBC Auto Differential       Renal/    Chronic kidney disease, stage 3a    Current Assessment & Plan     Lab Results   Component Value Date    CREATININE 1.6 (H) 12/17/2021    BUN 21 12/17/2021                 Oncology    Anemia in chronic illness    Current Assessment & Plan     Status pending labs         History of prostate cancer    Overview     Followed by Dr. Paul, Urology         Smoldering myeloma    Overview     Followed by Heme/Onc            Endocrine    Obesity (BMI 30.0-34.9)    Current Assessment & Plan     Wt Readings from Last 10 Encounters:   02/17/22 86.4 kg (190 lb 7.6 oz)   01/13/22 88.5 kg (195 lb)   12/21/21 88.8 kg (195 lb 12.3 oz)   10/20/21 90.3 kg (199 lb 2.6 oz)   09/21/21 89.7 kg (197 lb 12 oz)   05/11/21 91.8 kg (202 lb 6.1 oz)   05/11/21 91.7 kg (202 lb 2.6 oz)   02/22/21 89.6 kg (197 lb 8.5 oz)   01/18/21 84 kg (185 lb 3 oz)   11/13/20 87.3 kg (192 lb 7.4 oz)     Body mass index is 30.74 kg/m².             Other Visit Diagnoses     Routine general medical examination at a health care facility    -  Primary    Encounter for long-term (current) drug use        Relevant Orders     TSH        Fasting labs ordered  Schedule 6 month f/u with Dr. Hall  [facility] reviewed/updated.

## 2022-02-22 DIAGNOSIS — D84.9 IMMUNOSUPPRESSED STATUS: ICD-10-CM

## 2022-03-08 DIAGNOSIS — I50.9 CHF (NYHA CLASS III, ACC/AHA STAGE C): ICD-10-CM

## 2022-03-08 DIAGNOSIS — Z95.810 ICD (IMPLANTABLE CARDIOVERTER-DEFIBRILLATOR) IN PLACE: Primary | ICD-10-CM

## 2022-03-08 DIAGNOSIS — I25.5 ISCHEMIC CARDIOMYOPATHY: ICD-10-CM

## 2022-03-14 ENCOUNTER — TELEPHONE (OUTPATIENT)
Dept: CARDIOLOGY | Facility: CLINIC | Age: 82
End: 2022-03-14
Payer: MEDICARE

## 2022-03-14 DIAGNOSIS — I50.9 CHF (NYHA CLASS III, ACC/AHA STAGE C): Primary | ICD-10-CM

## 2022-03-20 PROCEDURE — G0179 PR HOME HEALTH MD RECERTIFICATION: ICD-10-PCS | Mod: ,,, | Performed by: PHYSICIAN ASSISTANT

## 2022-03-20 PROCEDURE — G0179 MD RECERTIFICATION HHA PT: HCPCS | Mod: ,,, | Performed by: PHYSICIAN ASSISTANT

## 2022-03-22 ENCOUNTER — EXTERNAL HOME HEALTH (OUTPATIENT)
Dept: HOME HEALTH SERVICES | Facility: HOSPITAL | Age: 82
End: 2022-03-22
Payer: MEDICARE

## 2022-03-23 NOTE — PROGRESS NOTES
Subjective:   Patient ID:  Curtis Landry is a 81 y.o. male who presents for evaluation of Congestive Heart Failure      HPI   Primary Cardiologist: Formerly Dr Boyd    Cardiac Problems:  1. CHF, NYHA FC III, EF recovered to 50% on 2020  2. NICM  3. S/P DC ICD (BizeeBee)  4. HTN  5. HLP  6 Bigeminy    Curtis Landry returns to CHF clinic for routine follow up.     Denies chest pain or anginal equivalents. No shortness of breath, CORBIN or palpitations. Denies orthopnea, PND or abdominal bloating. Reports regular walking without any issues lately. NO leg swelling or claudications. No recent falls, syncope or near syncopal events. Reports compliance with medications and dietary restrictions. NO CNS complaints to suggest TIA or CVA today. No signs of abnormal bleeding on ASA daily    Device check completed today in office, brief AFib episodes noted on interrogation. Given fall risk, low burden and anemia will continue to monitor for now and continue with BB and ASA daily.     Uses cane for fall prevention.   Has been doing well with sodium and fluid restrictions.   Denies any acute issues today in office.         Past Medical History:   Diagnosis Date    Anemia     Angina pectoris     Arthritis     CHF (congestive heart failure)     Glaucoma (increased eye pressure)     Followed by outside opthalmology    HTN (hypertension)     Hyperlipidemia     Macular degeneration     Pneumonia     did not require hospitalization    Prostate cancer     Prostate cancer     Urinary incontinence        Past Surgical History:   Procedure Laterality Date    APPENDECTOMY      CARDIAC DEFIBRILLATOR PLACEMENT      CARDIAC PACEMAKER PLACEMENT      CARDIAC PACEMAKER PLACEMENT      GALLBLADDER SURGERY      PROSTATECTOMY      TOTAL HIP ARTHROPLASTY         Social History     Tobacco Use    Smoking status: Former Smoker     Years: 20.00     Quit date: 1980     Years since quittin.2    Smokeless  tobacco: Never Used   Substance Use Topics    Alcohol use: No    Drug use: No       Family History   Problem Relation Age of Onset    Cancer Mother         pancreas    Cancer Father     No Known Problems Daughter     Diabetes Neg Hx     Heart disease Neg Hx     Hyperlipidemia Neg Hx     Hypertension Neg Hx     Kidney disease Neg Hx     Stroke Neg Hx      Wt Readings from Last 3 Encounters:   03/24/22 87.3 kg (192 lb 7.4 oz)   02/17/22 86.4 kg (190 lb 7.6 oz)   01/13/22 88.5 kg (195 lb)     Temp Readings from Last 3 Encounters:   02/17/22 97.7 °F (36.5 °C) (Temporal)   01/13/22 97.8 °F (36.6 °C)   05/11/21 97.5 °F (36.4 °C)     BP Readings from Last 3 Encounters:   03/24/22 (!) 148/80   02/17/22 134/72   01/13/22 121/68     Pulse Readings from Last 3 Encounters:   03/24/22 70   02/17/22 77   01/13/22 70     Current Outpatient Medications on File Prior to Visit   Medication Sig Dispense Refill    acetaminophen (TYLENOL) 650 MG TbSR Take 1 tablet (650 mg total) by mouth every 8 (eight) hours.  0    aspirin (ECOTRIN) 81 MG EC tablet Take by mouth. 1 Tablet, Delayed Release (E.C.) Oral Every day      ferrous gluconate (FERGON) 240 (27 FE) MG tablet Take 240 mg by mouth every other day.      losartan (COZAAR) 50 MG tablet Take 1 tablet (50 mg total) by mouth once daily. 90 tablet 3    [DISCONTINUED] carvediloL (COREG) 12.5 MG tablet Take 1 tablet (12.5 mg total) by mouth 2 (two) times daily. 60 tablet 11    dorzolamide-timolol 2-0.5% (COSOPT) 22.3-6.8 mg/mL ophthalmic solution Place 1 drop into both eyes 2 (two) times daily.  4    travoprost (TRAVATAN Z) 0.004 % ophthalmic solution Inject into the eye. 1 Drops Ophthalmic At bedtime       No current facility-administered medications on file prior to visit.       Review of Systems   Constitutional: Positive for malaise/fatigue.   HENT: Negative for hearing loss and hoarse voice.    Eyes: Negative for blurred vision and visual disturbance.   Cardiovascular:  "Negative for chest pain, claudication, dyspnea on exertion, irregular heartbeat, leg swelling, near-syncope, orthopnea, palpitations, paroxysmal nocturnal dyspnea and syncope.   Respiratory: Negative for cough, hemoptysis, shortness of breath, sleep disturbances due to breathing, snoring and wheezing.    Endocrine: Negative for cold intolerance and heat intolerance.   Hematologic/Lymphatic: Bruises/bleeds easily.   Skin: Negative for color change, dry skin and nail changes.   Musculoskeletal: Positive for arthritis and back pain. Negative for joint pain and myalgias.   Gastrointestinal: Negative for bloating, abdominal pain, constipation, nausea and vomiting.   Genitourinary: Negative for dysuria, flank pain, hematuria and hesitancy.   Neurological: Negative for headaches, light-headedness, loss of balance, numbness, paresthesias and weakness.   Psychiatric/Behavioral: Negative for altered mental status.   Allergic/Immunologic: Negative for environmental allergies.     BP (!) 148/80 (BP Location: Right arm, Patient Position: Sitting)   Pulse 70   Ht 5' 6" (1.676 m)   Wt 87.3 kg (192 lb 7.4 oz)   SpO2 97%   BMI 31.06 kg/m²     Objective:   Physical Exam  Vitals and nursing note reviewed.   Constitutional:       General: He is not in acute distress.     Appearance: Normal appearance. He is well-developed. He is not ill-appearing.   HENT:      Head: Normocephalic and atraumatic.      Nose: Nose normal.      Mouth/Throat:      Mouth: Mucous membranes are moist.   Eyes:      Pupils: Pupils are equal, round, and reactive to light.   Neck:      Thyroid: No thyromegaly.      Vascular: No JVD.      Trachea: No tracheal deviation.   Cardiovascular:      Rate and Rhythm: Normal rate and regular rhythm.      Chest Wall: PMI is not displaced.      Pulses: Intact distal pulses.           Radial pulses are 2+ on the right side and 2+ on the left side.        Dorsalis pedis pulses are 2+ on the right side and 2+ on the left " side.      Heart sounds: S1 normal and S2 normal. Heart sounds not distant. No murmur heard.     Comments: Left Chest ICD site in excellent repair  No recent firing.   Pulmonary:      Effort: Pulmonary effort is normal. No respiratory distress.      Breath sounds: Normal breath sounds and air entry. No decreased breath sounds, wheezing or rales.   Abdominal:      General: Bowel sounds are normal.      Palpations: Abdomen is soft.   Musculoskeletal:         General: No swelling. Normal range of motion.      Cervical back: Full passive range of motion without pain, normal range of motion and neck supple.      Right ankle: No swelling.      Left ankle: No swelling.   Skin:     General: Skin is warm and dry.      Capillary Refill: Capillary refill takes less than 2 seconds.      Nails: There is no clubbing.   Neurological:      General: No focal deficit present.      Mental Status: He is alert and oriented to person, place, and time.   Psychiatric:         Mood and Affect: Mood normal.         Speech: Speech normal.         Behavior: Behavior normal.         Thought Content: Thought content normal.         Judgment: Judgment normal.         Lab Results   Component Value Date    CHOL 213 (H) 10/02/2020    CHOL 147 11/26/2018    CHOL 139 06/13/2018     Lab Results   Component Value Date    HDL 35 (L) 10/02/2020    HDL 36 (L) 11/26/2018    HDL 27 (L) 06/13/2018     Lab Results   Component Value Date    LDLCALC 137.2 10/02/2020    LDLCALC 83.8 11/26/2018    LDLCALC 55.8 (L) 06/13/2018     Lab Results   Component Value Date    TRIG 204 (H) 10/02/2020    TRIG 136 11/26/2018    TRIG 281 (H) 06/13/2018     Lab Results   Component Value Date    CHOLHDL 16.4 (L) 10/02/2020    CHOLHDL 24.5 11/26/2018    CHOLHDL 19.4 (L) 06/13/2018       Chemistry        Component Value Date/Time     12/17/2021 1010    K 4.6 12/17/2021 1010     12/17/2021 1010    CO2 23 12/17/2021 1010    BUN 21 12/17/2021 1010    CREATININE 1.6 (H)  12/17/2021 1010     12/17/2021 1010        Component Value Date/Time    CALCIUM 10.1 12/17/2021 1010    ALKPHOS 48 (L) 05/11/2021 0739    AST 16 05/11/2021 0739    ALT 19 05/11/2021 0739    BILITOT 0.4 05/11/2021 0739    ESTGFRAFRICA 46.0 (A) 12/17/2021 1010    EGFRNONAA 39.8 (A) 12/17/2021 1010          Lab Results   Component Value Date    TSH 1.602 10/02/2020    TSH 1.694 10/02/2020     Lab Results   Component Value Date    INR 1.1 01/18/2021    INR 1.1 05/14/2016    INR 1.1 01/31/2013     Lab Results   Component Value Date    WBC 5.83 05/11/2021    HGB 11.9 (L) 05/11/2021    HCT 36.0 (L) 05/11/2021    MCV 94 05/11/2021     05/11/2021      1. TTE  Results for orders placed during the hospital encounter of 06/03/20    Echo Color Flow Doppler? Yes    Interpretation Summary  · Concentric left ventricular remodeling.  · Septal wall has abnormal motion consistent with left bundle branch block.  · Left ventricular systolic function. The estimated ejection fraction is 50%.  · Grade I (mild) left ventricular diastolic dysfunction consistent with impaired relaxation.    Assessment:      1. NICM (nonischemic cardiomyopathy)    2. Essential hypertension    3. Automatic implantable cardioverter-defibrillator in situ    4. Frequent PVCs    5. CHF (NYHA class III, ACC/AHA stage C)    6. Mixed hyperlipidemia    7. Chronic kidney disease, stage 3a    8. Anemia in chronic illness        Plan:   NICM (nonischemic cardiomyopathy)  -continue Coreg and Losartan  -Continue routine device interrogations every 6 months  -No recent ICD firing  -Continue DASH diet,2  Gm sodium restriction  -1500 ml fluid restriction  -Monitor BP/Hr at home    Anemia in chronic illness  -Continue Iron supplement  -Follow up with Hem/Onc as scheduled    HTN  -Continue BB, ARB    CKD  -CMP pending today    RTC in 6 months with device check            CHF SYNOPSIS:    GDMT:  ACE/ARB/ARNI:  Losartan 50 mg daily  Beta Blocker: Coreg 12.5 mg  BID  MRA:  none  SGLT2: none  Diuretic:  none    Last BMP: 12/17/2021  Next BMP Due: 3/24/2022    Last BNP:   Next BNP Due: 3/24/2022    Last Echo: 6/3/2020  Repeat Echo Due:    Nicole May, FNP-C [facility]/Heart Failure

## 2022-03-24 ENCOUNTER — OFFICE VISIT (OUTPATIENT)
Dept: CARDIOLOGY | Facility: CLINIC | Age: 82
End: 2022-03-24
Payer: MEDICARE

## 2022-03-24 ENCOUNTER — HOSPITAL ENCOUNTER (OUTPATIENT)
Dept: CARDIOLOGY | Facility: HOSPITAL | Age: 82
Discharge: HOME | End: 2022-03-24
Attending: NURSE PRACTITIONER
Payer: MEDICARE

## 2022-03-24 ENCOUNTER — HOSPITAL ENCOUNTER (OUTPATIENT)
Dept: CARDIOLOGY | Facility: HOSPITAL | Age: 82
Discharge: HOME | End: 2022-03-24
Attending: NUCLEAR MEDICINE
Payer: MEDICARE

## 2022-03-24 VITALS
DIASTOLIC BLOOD PRESSURE: 80 MMHG | BODY MASS INDEX: 30.93 KG/M2 | OXYGEN SATURATION: 97 % | HEIGHT: 66 IN | WEIGHT: 192.44 LBS | HEART RATE: 70 BPM | SYSTOLIC BLOOD PRESSURE: 148 MMHG

## 2022-03-24 DIAGNOSIS — I25.5 ISCHEMIC CARDIOMYOPATHY: ICD-10-CM

## 2022-03-24 DIAGNOSIS — D63.8 ANEMIA IN CHRONIC ILLNESS: ICD-10-CM

## 2022-03-24 DIAGNOSIS — I50.9 CHF (NYHA CLASS III, ACC/AHA STAGE C): Chronic | ICD-10-CM

## 2022-03-24 DIAGNOSIS — I49.3 FREQUENT PVCS: Chronic | ICD-10-CM

## 2022-03-24 DIAGNOSIS — E78.2 MIXED HYPERLIPIDEMIA: ICD-10-CM

## 2022-03-24 DIAGNOSIS — Z95.810 ICD (IMPLANTABLE CARDIOVERTER-DEFIBRILLATOR) IN PLACE: ICD-10-CM

## 2022-03-24 DIAGNOSIS — Z95.810 AUTOMATIC IMPLANTABLE CARDIOVERTER-DEFIBRILLATOR IN SITU: ICD-10-CM

## 2022-03-24 DIAGNOSIS — I10 ESSENTIAL HYPERTENSION: ICD-10-CM

## 2022-03-24 DIAGNOSIS — I50.9 CHF (NYHA CLASS III, ACC/AHA STAGE C): ICD-10-CM

## 2022-03-24 DIAGNOSIS — I42.8 NICM (NONISCHEMIC CARDIOMYOPATHY): Primary | ICD-10-CM

## 2022-03-24 DIAGNOSIS — N18.31 CHRONIC KIDNEY DISEASE, STAGE 3A: ICD-10-CM

## 2022-03-24 PROCEDURE — 3288F PR FALLS RISK ASSESSMENT DOCUMENTED: ICD-10-PCS | Mod: CPTII,S$GLB,, | Performed by: NURSE PRACTITIONER

## 2022-03-24 PROCEDURE — 1126F AMNT PAIN NOTED NONE PRSNT: CPT | Mod: CPTII,S$GLB,, | Performed by: NURSE PRACTITIONER

## 2022-03-24 PROCEDURE — 3079F PR MOST RECENT DIASTOLIC BLOOD PRESSURE 80-89 MM HG: ICD-10-PCS | Mod: CPTII,S$GLB,, | Performed by: NURSE PRACTITIONER

## 2022-03-24 PROCEDURE — 3077F SYST BP >= 140 MM HG: CPT | Mod: CPTII,S$GLB,, | Performed by: NURSE PRACTITIONER

## 2022-03-24 PROCEDURE — 99999 PR PBB SHADOW E&M-EST. PATIENT-LVL III: ICD-10-PCS | Mod: PBBFAC,,, | Performed by: NURSE PRACTITIONER

## 2022-03-24 PROCEDURE — 99214 PR OFFICE/OUTPT VISIT, EST, LEVL IV, 30-39 MIN: ICD-10-PCS | Mod: S$GLB,,, | Performed by: NURSE PRACTITIONER

## 2022-03-24 PROCEDURE — 1101F PR PT FALLS ASSESS DOC 0-1 FALLS W/OUT INJ PAST YR: ICD-10-PCS | Mod: CPTII,S$GLB,, | Performed by: NURSE PRACTITIONER

## 2022-03-24 PROCEDURE — 93283 PRGRMG EVAL IMPLANTABLE DFB: CPT

## 2022-03-24 PROCEDURE — 1159F PR MEDICATION LIST DOCUMENTED IN MEDICAL RECORD: ICD-10-PCS | Mod: CPTII,S$GLB,, | Performed by: NURSE PRACTITIONER

## 2022-03-24 PROCEDURE — 93283 CARDIAC DEVICE CHECK - IN CLINIC & HOSPITAL: ICD-10-PCS | Mod: 26,,, | Performed by: NURSE PRACTITIONER

## 2022-03-24 PROCEDURE — 1101F PT FALLS ASSESS-DOCD LE1/YR: CPT | Mod: CPTII,S$GLB,, | Performed by: NURSE PRACTITIONER

## 2022-03-24 PROCEDURE — 93283 PRGRMG EVAL IMPLANTABLE DFB: CPT | Mod: 26,,, | Performed by: NURSE PRACTITIONER

## 2022-03-24 PROCEDURE — 3288F FALL RISK ASSESSMENT DOCD: CPT | Mod: CPTII,S$GLB,, | Performed by: NURSE PRACTITIONER

## 2022-03-24 PROCEDURE — 93010 ELECTROCARDIOGRAM REPORT: CPT | Mod: ,,, | Performed by: INTERNAL MEDICINE

## 2022-03-24 PROCEDURE — 1126F PR PAIN SEVERITY QUANTIFIED, NO PAIN PRESENT: ICD-10-PCS | Mod: CPTII,S$GLB,, | Performed by: NURSE PRACTITIONER

## 2022-03-24 PROCEDURE — 99999 PR PBB SHADOW E&M-EST. PATIENT-LVL III: CPT | Mod: PBBFAC,,, | Performed by: NURSE PRACTITIONER

## 2022-03-24 PROCEDURE — 3079F DIAST BP 80-89 MM HG: CPT | Mod: CPTII,S$GLB,, | Performed by: NURSE PRACTITIONER

## 2022-03-24 PROCEDURE — 1159F MED LIST DOCD IN RCRD: CPT | Mod: CPTII,S$GLB,, | Performed by: NURSE PRACTITIONER

## 2022-03-24 PROCEDURE — 3077F PR MOST RECENT SYSTOLIC BLOOD PRESSURE >= 140 MM HG: ICD-10-PCS | Mod: CPTII,S$GLB,, | Performed by: NURSE PRACTITIONER

## 2022-03-24 PROCEDURE — 93010 EKG 12-LEAD: ICD-10-PCS | Mod: ,,, | Performed by: INTERNAL MEDICINE

## 2022-03-24 PROCEDURE — 99214 OFFICE O/P EST MOD 30 MIN: CPT | Mod: S$GLB,,, | Performed by: NURSE PRACTITIONER

## 2022-03-24 PROCEDURE — 93005 ELECTROCARDIOGRAM TRACING: CPT

## 2022-03-24 RX ORDER — CARVEDILOL 12.5 MG/1
12.5 TABLET ORAL 2 TIMES DAILY
Qty: 180 TABLET | Refills: 3 | Status: SHIPPED | OUTPATIENT
Start: 2022-03-24 | End: 2023-03-29 | Stop reason: SDUPTHER

## 2022-03-24 RX ORDER — LOSARTAN POTASSIUM 50 MG/1
50 TABLET ORAL DAILY
Qty: 90 TABLET | Refills: 3 | Status: SHIPPED | OUTPATIENT
Start: 2022-03-24 | End: 2023-03-29 | Stop reason: SDUPTHER

## 2022-04-25 ENCOUNTER — TELEPHONE (OUTPATIENT)
Dept: INTERNAL MEDICINE | Facility: CLINIC | Age: 82
End: 2022-04-25
Payer: MEDICARE

## 2022-04-25 DIAGNOSIS — H54.3 BLINDNESS OF BOTH EYES: Primary | ICD-10-CM

## 2022-04-25 NOTE — TELEPHONE ENCOUNTER
"----- Message from Sheba Foster sent at 4/25/2022  8:45 AM CDT -----  "Type:  Patient Call Back    Who Called:Rosa Landry (Daughter)    What is the reqeust in detail:Requesting call back to get information on Home Health service for pt. Please advise    Can the clinic reply by [facility]? no    Best Call Back Number: [phone]      Additional Information             "

## 2022-04-25 NOTE — TELEPHONE ENCOUNTER
Spoke to to Pt.  Pt gave verbal permission to speak to daughter Rosa Landry at [phone] since she is not listed on his chart.  Pt daughter advises she is Pts POA.  We do not have documentation of this and asked pt daughter to fax documentation so that it can be added to chart. Pt's daughter states pt's eyesight is deteriorating to the point that pt is struggling with ADLs and pt's daughter would like to start the process to get pt a sitter/ caregiver through Home Health.  Pt's daughter wants to avoid a facility if at all possible.  Please advise on options for pt.

## 2022-04-26 ENCOUNTER — TELEPHONE (OUTPATIENT)
Dept: INTERNAL MEDICINE | Facility: CLINIC | Age: 82
End: 2022-04-26
Payer: MEDICARE

## 2022-04-26 NOTE — TELEPHONE ENCOUNTER
Spoke to pts daughter and advised we do not have e-mail capabilities.  We only have fax.  Pts daughter said she will send documentation to someone to fax for her.

## 2022-04-26 NOTE — TELEPHONE ENCOUNTER
----- Message from Kala Chang sent at 4/26/2022  4:19 PM CDT -----  Pt's daughter (Rosa) is requesting an email to send of documents because she is having trouble faxing. Her call back number is [phone]. x. El

## 2022-04-27 ENCOUNTER — TELEPHONE (OUTPATIENT)
Dept: INTERNAL MEDICINE | Facility: CLINIC | Age: 82
End: 2022-04-27
Payer: MEDICARE

## 2022-04-27 NOTE — TELEPHONE ENCOUNTER
Please advise.  The following is message received from [facility]:    Chinmay ROMO Staff; Liz Hall MD  Phone Number: [phone]     Good evening,     I pulled the sub orders for Mr. Landry today. I wanted to report that in order for patient to qualify for hha with insurance first we need an order for OT to eval the need for HHA in the home. Are you ok with adding that to the order?     Secondly HHA for our company is not available to come out three times weekly. Usually they get an aid for like 1 week 4. Due to staffing we are unable to provide an HHA that frequent/long term in the home.     Thanks,     Chinmay Rodriguez LPN   Intake   [facility]

## 2022-04-27 NOTE — TELEPHONE ENCOUNTER
Spoke to pt daughter Rosa who holds medical POA for pt.  She lives in [place] and Bela Cortes takes pt to and from local appts.  Rosa would like a referral to  to give a buffer of care for pt and to get  involved to help provide possible resources for care due to pt not having VA benefits or long term care insurance.  Please advise

## 2022-04-27 NOTE — TELEPHONE ENCOUNTER
Subsequent home health orders placed for  to assist with long range planning and aide 3 times weekly (although this is not long term solution).

## 2022-04-27 NOTE — TELEPHONE ENCOUNTER
We can order home health and request assistance with ADLs, however that will not be a long-term solution.  We can request  to assist with long-range planning.  Family can also consider hiring caregivers to assist patient.  If he has long-term care insurance and or VA benefits that may assist with cost.

## 2022-05-03 ENCOUNTER — TELEPHONE (OUTPATIENT)
Dept: INTERNAL MEDICINE | Facility: CLINIC | Age: 82
End: 2022-05-03
Payer: MEDICARE

## 2022-05-03 DIAGNOSIS — H54.3 BLINDNESS OF BOTH EYES: Primary | ICD-10-CM

## 2022-05-03 NOTE — TELEPHONE ENCOUNTER
Rosa is most concerned about a  bc the patient is blind now. I advised her that [facility] has a  and maybe we can refer to them so patient can get those services?

## 2022-05-03 NOTE — TELEPHONE ENCOUNTER
Please advise.  The following is message received from [facility]:     Chinmay ROMO Staff; Liz Hall MD  Phone Number: [phone]      Good evening,     I pulled the sub orders for Mr. Landry today. I wanted to report that in order for patient to qualify for hha with insurance first we need an order for OT to eval the need for HHA in the home. Are you ok with adding that to the order?     Secondly HHA for our company is not available to come out three times weekly. Usually they get an aid for like 1 week 4. Due to staffing we are unable to provide an HHA that frequent/long term in the home.     Thanks,     Chinmay Rodriguez LPN   Intake   [facility]

## 2022-05-03 NOTE — TELEPHONE ENCOUNTER
I can place referral to [employer]. It appears he will have to have OT evaluation to determine if he will qualify for home health services. I'll put in OT order. If they desire referral to [employer] I'll be happy to place that as well.

## 2022-05-03 NOTE — TELEPHONE ENCOUNTER
----- Message from Stephanie Reeves sent at 5/3/2022  9:26 AM CDT -----  Contact: Rosa/ Daughter  Rosa would like a call back at [phone], in regards to services that she requested.

## 2022-05-23 ENCOUNTER — DOCUMENT SCAN (OUTPATIENT)
Dept: HOME HEALTH SERVICES | Facility: HOSPITAL | Age: 82
End: 2022-05-23
Payer: MEDICARE

## 2022-05-25 ENCOUNTER — DOCUMENT SCAN (OUTPATIENT)
Dept: HOME HEALTH SERVICES | Facility: HOSPITAL | Age: 82
End: 2022-05-25
Payer: MEDICARE

## 2022-06-14 ENCOUNTER — TELEPHONE (OUTPATIENT)
Dept: INTERNAL MEDICINE | Facility: CLINIC | Age: 82
End: 2022-06-14
Payer: MEDICARE

## 2022-06-14 NOTE — TELEPHONE ENCOUNTER
Pts daughter Rosa (POA) states pt has lost all sight.  Pts daughter would like to have advice on how to access services for the blind and assist pt with adjusting to a sightless world.  At this time pt has an aide coming biweekly to assist.  Pts daughter would like  involvement to help assist with resources for pt.

## 2022-06-14 NOTE — TELEPHONE ENCOUNTER
----- Message from Alvina Callaway sent at 6/14/2022 10:03 AM CDT -----  States she's calling regarding .  Please call Rosa Landry [phone]. Thank you

## 2022-06-14 NOTE — TELEPHONE ENCOUNTER
Called pts MPOA to discuss provider question about if pt has discussed blindness with Opthalmologist.  No answer, LVM to call clinic back

## 2022-06-20 ENCOUNTER — TELEPHONE (OUTPATIENT)
Dept: INTERNAL MEDICINE | Facility: CLINIC | Age: 82
End: 2022-06-20
Payer: MEDICARE

## 2022-06-20 DIAGNOSIS — H54.3 BLINDNESS OF BOTH EYES: Primary | ICD-10-CM

## 2022-06-20 NOTE — TELEPHONE ENCOUNTER
"Spoke to Pts AFSHAN Lee.  Rosa advises that pt does see an Optometrist; however, Rosa also states that no advice, or focus has been given to pt on losing sight.  Rosa states that pt has "some kind of degenerative disease, I can't remember the name" but states that Opt. Focuses on interocular pressure rather than sight loss.  Pts AFSHAN further states that on 06/20/2022 home sitter reported the following vitals:    P:68  Resp: 16  BP: 160/90    Advised that P and R are WNL and the bp is elevated.  Educated MPOA on s/s to look for and pressure levels to be aware of necessitating ED intervention.  MPOA verbalized understanding.  "

## 2022-06-20 NOTE — TELEPHONE ENCOUNTER
I can place OPCM referral to assist with finding resources for patient if desired. If BP continues to be elevated, please arrange follow up with PC team.

## 2022-06-24 ENCOUNTER — TELEPHONE (OUTPATIENT)
Dept: INTERNAL MEDICINE | Facility: CLINIC | Age: 82
End: 2022-06-24
Payer: MEDICARE

## 2022-06-24 NOTE — TELEPHONE ENCOUNTER
----- Message from Elisabeth Melgoza sent at 6/24/2022 11:30 AM CDT -----  Contact: Pt Daughter  .Type:  Needs Medical Advice    Who Called: Pt Daughter   Would the patient rather a call back or a response via [facility]? Call   Best Call Back Number: [phone]  Additional Information: Pt daughter is req a call back from the nurse in regards to resources for her dad.. Thanks AW

## 2022-07-01 ENCOUNTER — TELEPHONE (OUTPATIENT)
Dept: INTERNAL MEDICINE | Facility: CLINIC | Age: 82
End: 2022-07-01
Payer: MEDICARE

## 2022-07-01 NOTE — TELEPHONE ENCOUNTER
----- Message from Agustin Lozada sent at 7/1/2022  2:32 PM CDT -----  Pt is needing a call back regarding resources for the sukhwinder. Please call [phone]

## 2022-07-01 NOTE — TELEPHONE ENCOUNTER
I see we have a referral into case mngmt.  Pt is requesting resources for the blind.  Is there additional help we can offer pt?

## 2022-07-01 NOTE — TELEPHONE ENCOUNTER
Pt has been given a  referral by provider.  Pts family is very eager for him to have assistance for his blindness.  Please reach out to pts family to assist.

## 2022-07-01 NOTE — TELEPHONE ENCOUNTER
That is why I put in referral to case management to assist patient. I also advised checking with his eye doctor.

## 2022-07-05 ENCOUNTER — OUTPATIENT CASE MANAGEMENT (OUTPATIENT)
Dept: ADMINISTRATIVE | Facility: OTHER | Age: 82
End: 2022-07-05
Payer: MEDICARE

## 2022-07-05 NOTE — PROGRESS NOTES
7/5/2022    1st Attempt to complete Social Work Assessment for Outpatient Care Management; left message requesting a return call.  LCSW will reattempt at a later date.

## 2022-07-06 ENCOUNTER — OUTPATIENT CASE MANAGEMENT (OUTPATIENT)
Dept: ADMINISTRATIVE | Facility: OTHER | Age: 82
End: 2022-07-06
Payer: MEDICARE

## 2022-07-06 NOTE — PROGRESS NOTES
Outpatient Care Management   - Low Risk Patient Assessment    Patient: Curtis Landry  MRN:  [id number]  Date of Service:  7/6/2022  Completed by:  Winnie Barker LCSW  Referral Date: 06/20/2022  Program:      Reason for Visit   Patient presents with    OPCM Chart Review    Social Work Assessment - Low/Mod Risk    Plan Of Care       7/6/2022    SW telephoned the pt and provided resource information. SW will follow up with pt in one week.     Patient Summary     OPCM Social Work Assessment (Low/Moderate Risk)    General  Level of Caregiver support: Caregiver currently provides assistance  Have you had to make a decision between paying for any of the following in the last 2 months?: None  Transportation means: Family  Employment status: Retired and not working  Do you have any of the following?: Healthcare proxy  Current symptoms: None  Assessments  Was the PHQ Depression Screening completed this visit?: No  Was the SAUD-7 Screening completed this visit?: No         Complex Care Plan     Care plan was discussed and completed today with input from patient and/or caregiver.    Patient Instructions     Follow up in about 1 week (around 7/13/2022).    Todays OPCM Self-Management Care Plan was developed with the patients/caregivers input and was based on identified barriers from todays assessment.  Goals were written today with the patient/caregiver and the patient has agreed to work towards these goals to improve his/her overall well-being. Patient verbalized understanding of the care plan, goals, and all of today's instructions. Encouraged patient/caregiver to communicate with his/her physician and health care team about health conditions and the treatment plan.  Provided my contact information today and encouraged patient/caregiver to call me with any questions as needed.

## 2022-07-15 ENCOUNTER — OUTPATIENT CASE MANAGEMENT (OUTPATIENT)
Dept: ADMINISTRATIVE | Facility: OTHER | Age: 82
End: 2022-07-15
Payer: MEDICARE

## 2022-07-19 NOTE — PROGRESS NOTES
Outpatient Care Management   - Care Plan Follow Up    Patient: Curtis Landry  MRN:  [id number]  Date of Service:  7/15/2022  Completed by:  Winnie Barker LCSW  Referral Date: 06/20/2022  Program:     Reason for Visit   Patient presents with    OPCM Chart Review    Update Plan Of Care    OPCM SW FOLLOW-UP       7/15/2022    SW telephoned pt and spoke with his caregiver about contacting Medicaid about the CCW. The SW will follow up in two weeks.     Complex Care Plan     Care plan was discussed and completed today with input from patient and/or caregiver.    Patient Instructions         Follow up in about 2 weeks (around 7/29/2022).    Todays OPCM Self-Management Care Plan was developed with the patients/caregivers input and was based on identified barriers from todays assessment.  Goals were written today with the patient/caregiver and the patient has agreed to work towards these goals to improve his/her overall well-being. Patient verbalized understanding of the care plan, goals, and all of today's instructions. Encouraged patient/caregiver to communicate with his/her physician and health care team about health conditions and the treatment plan.  Provided my contact information today and encouraged patient/caregiver to call me with any questions as needed.

## 2022-07-29 ENCOUNTER — OUTPATIENT CASE MANAGEMENT (OUTPATIENT)
Dept: ADMINISTRATIVE | Facility: OTHER | Age: 82
End: 2022-07-29
Payer: MEDICARE

## 2022-08-02 NOTE — PROGRESS NOTES
Outpatient Care Management   - Care Plan Follow Up    Patient: Curtis Landry  MRN:  [id number]  Date of Service:  8/2/2022  Completed by:  Winnie Barker LCSW  Referral Date: 06/20/2022  Program:     Reason for Visit   Patient presents with    Case Closure    OPCM SW FOLLOW-UP     7/29/2022    SW telephoned the pt and spoke with his caregiver. SW discussed case closure and they are in agreement with case closure.     Complex Care Plan     Care plan was discussed and completed today with input from patient and/or caregiver.    Patient Instructions         No follow-ups on file.    Todays OPCM Self-Management Care Plan was developed with the patients/caregivers input and was based on identified barriers from todays assessment.  Goals were written today with the patient/caregiver and the patient has agreed to work towards these goals to improve his/her overall well-being. Patient verbalized understanding of the care plan, goals, and all of today's instructions. Encouraged patient/caregiver to communicate with his/her physician and health care team about health conditions and the treatment plan.  Provided my contact information today and encouraged patient/caregiver to call me with any questions as needed.

## 2022-08-22 ENCOUNTER — OFFICE VISIT (OUTPATIENT)
Dept: INTERNAL MEDICINE | Facility: CLINIC | Age: 82
End: 2022-08-22
Payer: MEDICARE

## 2022-08-22 VITALS
TEMPERATURE: 97 F | DIASTOLIC BLOOD PRESSURE: 80 MMHG | OXYGEN SATURATION: 98 % | BODY MASS INDEX: 30.44 KG/M2 | HEIGHT: 66 IN | HEART RATE: 80 BPM | WEIGHT: 189.38 LBS | RESPIRATION RATE: 16 BRPM | SYSTOLIC BLOOD PRESSURE: 120 MMHG

## 2022-08-22 DIAGNOSIS — I49.3 FREQUENT PVCS: Chronic | ICD-10-CM

## 2022-08-22 DIAGNOSIS — C61 PROSTATE CANCER: ICD-10-CM

## 2022-08-22 DIAGNOSIS — I42.8 NICM (NONISCHEMIC CARDIOMYOPATHY): ICD-10-CM

## 2022-08-22 DIAGNOSIS — I10 ESSENTIAL HYPERTENSION: Primary | ICD-10-CM

## 2022-08-22 DIAGNOSIS — D47.2 MGUS (MONOCLONAL GAMMOPATHY OF UNKNOWN SIGNIFICANCE): ICD-10-CM

## 2022-08-22 DIAGNOSIS — H54.3 BLINDNESS OF BOTH EYES: ICD-10-CM

## 2022-08-22 DIAGNOSIS — Z28.39 IMMUNIZATION DEFICIENCY: ICD-10-CM

## 2022-08-22 PROCEDURE — 3288F FALL RISK ASSESSMENT DOCD: CPT | Mod: CPTII,S$GLB,, | Performed by: FAMILY MEDICINE

## 2022-08-22 PROCEDURE — 99999 PR PBB SHADOW E&M-EST. PATIENT-LVL IV: ICD-10-PCS | Mod: PBBFAC,,, | Performed by: FAMILY MEDICINE

## 2022-08-22 PROCEDURE — 1159F MED LIST DOCD IN RCRD: CPT | Mod: CPTII,S$GLB,, | Performed by: FAMILY MEDICINE

## 2022-08-22 PROCEDURE — 1101F PT FALLS ASSESS-DOCD LE1/YR: CPT | Mod: CPTII,S$GLB,, | Performed by: FAMILY MEDICINE

## 2022-08-22 PROCEDURE — 3288F PR FALLS RISK ASSESSMENT DOCUMENTED: ICD-10-PCS | Mod: CPTII,S$GLB,, | Performed by: FAMILY MEDICINE

## 2022-08-22 PROCEDURE — 99214 OFFICE O/P EST MOD 30 MIN: CPT | Mod: S$GLB,,, | Performed by: FAMILY MEDICINE

## 2022-08-22 PROCEDURE — 99214 PR OFFICE/OUTPT VISIT, EST, LEVL IV, 30-39 MIN: ICD-10-PCS | Mod: S$GLB,,, | Performed by: FAMILY MEDICINE

## 2022-08-22 PROCEDURE — 1159F PR MEDICATION LIST DOCUMENTED IN MEDICAL RECORD: ICD-10-PCS | Mod: CPTII,S$GLB,, | Performed by: FAMILY MEDICINE

## 2022-08-22 PROCEDURE — 3079F PR MOST RECENT DIASTOLIC BLOOD PRESSURE 80-89 MM HG: ICD-10-PCS | Mod: CPTII,S$GLB,, | Performed by: FAMILY MEDICINE

## 2022-08-22 PROCEDURE — 1126F AMNT PAIN NOTED NONE PRSNT: CPT | Mod: CPTII,S$GLB,, | Performed by: FAMILY MEDICINE

## 2022-08-22 PROCEDURE — 3074F SYST BP LT 130 MM HG: CPT | Mod: CPTII,S$GLB,, | Performed by: FAMILY MEDICINE

## 2022-08-22 PROCEDURE — 1157F PR ADVANCE CARE PLAN OR EQUIV PRESENT IN MEDICAL RECORD: ICD-10-PCS | Mod: CPTII,S$GLB,, | Performed by: FAMILY MEDICINE

## 2022-08-22 PROCEDURE — 1101F PR PT FALLS ASSESS DOC 0-1 FALLS W/OUT INJ PAST YR: ICD-10-PCS | Mod: CPTII,S$GLB,, | Performed by: FAMILY MEDICINE

## 2022-08-22 PROCEDURE — 3074F PR MOST RECENT SYSTOLIC BLOOD PRESSURE < 130 MM HG: ICD-10-PCS | Mod: CPTII,S$GLB,, | Performed by: FAMILY MEDICINE

## 2022-08-22 PROCEDURE — 1126F PR PAIN SEVERITY QUANTIFIED, NO PAIN PRESENT: ICD-10-PCS | Mod: CPTII,S$GLB,, | Performed by: FAMILY MEDICINE

## 2022-08-22 PROCEDURE — 99999 PR PBB SHADOW E&M-EST. PATIENT-LVL IV: CPT | Mod: PBBFAC,,, | Performed by: FAMILY MEDICINE

## 2022-08-22 PROCEDURE — 1157F ADVNC CARE PLAN IN RCRD: CPT | Mod: CPTII,S$GLB,, | Performed by: FAMILY MEDICINE

## 2022-08-22 PROCEDURE — 3079F DIAST BP 80-89 MM HG: CPT | Mod: CPTII,S$GLB,, | Performed by: FAMILY MEDICINE

## 2022-08-22 NOTE — PROGRESS NOTES
Subjective:       Patient ID: Curtis Landry is a 81 y.o. male.    Chief Complaint: Follow-up    Follow-up hypertension hyperlipidemia nonischemic cardiomyopathy chronic anemia PVCs chronic renal disease.  He denies chest pain palpitations shortness of breath or edema.  Lab was done 5 months ago in reviewed.  Medications were reviewed.  He voices no concerns today.    Review of Systems   Constitutional: Negative for chills and fever.   HENT: Negative for congestion.    Eyes: Positive for visual disturbance.        Visually impaired.  Diagnosed with blindness   Respiratory: Negative for cough, chest tightness, shortness of breath and wheezing.    Cardiovascular: Negative for chest pain, palpitations and leg swelling.   Gastrointestinal: Negative for abdominal distention, abdominal pain, constipation and diarrhea.   Endocrine: Negative for polydipsia and polyuria.   Neurological: Negative for dizziness, weakness, light-headedness and headaches.       Objective:      Physical Exam  Constitutional:       General: He is not in acute distress.     Appearance: He is not ill-appearing or diaphoretic.   HENT:      Nose: Nose normal.   Eyes:      Conjunctiva/sclera: Conjunctivae normal.   Neck:      Comments: Normal thyroid  Cardiovascular:      Rate and Rhythm: Normal rate.      Heart sounds: No murmur heard.    No gallop.      Comments: Occasional premature beat.  Blood pressure is normal.  Pulse is 80.  Pulmonary:      Effort: Pulmonary effort is normal. No respiratory distress.      Breath sounds: No wheezing, rhonchi or rales.   Abdominal:      General: There is no distension.   Lymphadenopathy:      Cervical: No cervical adenopathy.   Skin:     General: Skin is warm and dry.      Coloration: Skin is not pale.      Findings: No erythema.   Neurological:      Mental Status: He is alert.   Psychiatric:         Mood and Affect: Mood normal.         Behavior: Behavior normal.         Lab Visit on 03/24/2022   Component Date  Value Ref Range Status    Protein, Serum 03/24/2022 7.8  6.0 - 8.4 g/dL Final    Albumin 03/24/2022 4.28  3.35 - 5.55 g/dL Final    Alpha-1 03/24/2022 0.24  0.17 - 0.41 g/dL Final    Alpha-2 03/24/2022 0.77  0.43 - 0.99 g/dL Final    Beta 03/24/2022 0.69  0.50 - 1.10 g/dL Final    Gamma 03/24/2022 1.81 (A) 0.67 - 1.58 g/dL Final    Kappa Free Light Chains 03/24/2022 2.37 (A) 0.33 - 1.94 mg/dL Final    Lambda Free Light Chains 03/24/2022 5.09 (A) 0.57 - 2.63 mg/dL Final    Kappa/Lambda FLC Ratio 03/24/2022 0.47  0.26 - 1.65 Final    PSA Diagnostic 03/24/2022 <0.01  0.00 - 4.00 ng/mL Final    BNP 03/24/2022 37  0 - 99 pg/mL Final    Sodium 03/24/2022 141  136 - 145 mmol/L Final    Potassium 03/24/2022 3.9  3.5 - 5.1 mmol/L Final    Chloride 03/24/2022 106  95 - 110 mmol/L Final    CO2 03/24/2022 26  23 - 29 mmol/L Final    Glucose 03/24/2022 95  70 - 110 mg/dL Final    BUN 03/24/2022 12  8 - 23 mg/dL Final    Creatinine 03/24/2022 1.2  0.5 - 1.4 mg/dL Final    Calcium 03/24/2022 10.1  8.7 - 10.5 mg/dL Final    Total Protein 03/24/2022 8.2  6.0 - 8.4 g/dL Final    Albumin 03/24/2022 3.9  3.5 - 5.2 g/dL Final    Total Bilirubin 03/24/2022 0.7  0.1 - 1.0 mg/dL Final    Alkaline Phosphatase 03/24/2022 47 (A) 55 - 135 U/L Final    AST 03/24/2022 17  10 - 40 U/L Final    ALT 03/24/2022 11  10 - 44 U/L Final    Anion Gap 03/24/2022 9  8 - 16 mmol/L Final    eGFR if African American 03/24/2022 >60.0  >60 mL/min/1.73 m^2 Final    eGFR if non African American 03/24/2022 56.4 (A) >60 mL/min/1.73 m^2 Final    Cholesterol 03/24/2022 205 (A) 120 - 199 mg/dL Final    Triglycerides 03/24/2022 197 (A) 30 - 150 mg/dL Final    HDL 03/24/2022 33 (A) 40 - 75 mg/dL Final    LDL Cholesterol 03/24/2022 132.6  63.0 - 159.0 mg/dL Final    HDL/Cholesterol Ratio 03/24/2022 16.1 (A) 20.0 - 50.0 % Final    Total Cholesterol/HDL Ratio 03/24/2022 6.2 (A) 2.0 - 5.0 Final    Non-HDL Cholesterol 03/24/2022 172  mg/dL  Final    TSH 03/24/2022 2.288  0.400 - 4.000 uIU/mL Final    WBC 03/24/2022 5.29  3.90 - 12.70 K/uL Final    RBC 03/24/2022 3.98 (A) 4.60 - 6.20 M/uL Final    Hemoglobin 03/24/2022 12.3 (A) 14.0 - 18.0 g/dL Final    Hematocrit 03/24/2022 36.8 (A) 40.0 - 54.0 % Final    MCV 03/24/2022 93  82 - 98 fL Final    MCH 03/24/2022 30.9  27.0 - 31.0 pg Final    MCHC 03/24/2022 33.4  32.0 - 36.0 g/dL Final    RDW 03/24/2022 12.3  11.5 - 14.5 % Final    Platelets 03/24/2022 226  150 - 450 K/uL Final    MPV 03/24/2022 9.8  9.2 - 12.9 fL Final    Immature Granulocytes 03/24/2022 0.2  0.0 - 0.5 % Final    Gran # (ANC) 03/24/2022 2.5  1.8 - 7.7 K/uL Final    Immature Grans (Abs) 03/24/2022 0.01  0.00 - 0.04 K/uL Final    Lymph # 03/24/2022 2.2  1.0 - 4.8 K/uL Final    Mono # 03/24/2022 0.4  0.3 - 1.0 K/uL Final    Eos # 03/24/2022 0.2  0.0 - 0.5 K/uL Final    Baso # 03/24/2022 0.04  0.00 - 0.20 K/uL Final    nRBC 03/24/2022 0  0 /100 WBC Final    Gran % 03/24/2022 47.8  38.0 - 73.0 % Final    Lymph % 03/24/2022 40.6  18.0 - 48.0 % Final    Mono % 03/24/2022 7.8  4.0 - 15.0 % Final    Eosinophil % 03/24/2022 2.8  0.0 - 8.0 % Final    Basophil % 03/24/2022 0.8  0.0 - 1.9 % Final    Differential Method 03/24/2022 Automated   Final    Pathologist Interpretation SPE 03/24/2022 REVIEWED   Final     Assessment:       1. Essential hypertension    2. NICM (nonischemic cardiomyopathy)    3. MGUS (monoclonal gammopathy of unknown significance)    4. Frequent PVCs    5. Prostate cancer    6. Immunization deficiency    7. Blindness of both eyes        Plan:     Blood pressure controlled medications reviewed up-to-date lab health maintenance reviewed.  Increase to Shingrix immunization today.  Follow-up with Dr. Hall in 6 months and repeat Shingrix.    Essential hypertension    NICM (nonischemic cardiomyopathy)    MGUS (monoclonal gammopathy of unknown significance)    Frequent PVCs    Prostate cancer    Immunization  deficiency    Blindness of both eyes

## 2022-09-20 DIAGNOSIS — D47.2 MGUS (MONOCLONAL GAMMOPATHY OF UNKNOWN SIGNIFICANCE): Primary | ICD-10-CM

## 2022-09-20 DIAGNOSIS — D47.2 SMOLDERING MYELOMA: ICD-10-CM

## 2022-09-28 NOTE — PROGRESS NOTES
Subjective:   Patient ID:  Curtis Landry is a 81 y.o. male who presents for evaluation of Congestive Heart Failure      HPI   Primary Cardiologist: Formerly Dr Boyd    Cardiac Problems:  1. CHF, NYHA FC III, EF recovered to 50% on 6/2020  2. NICM  3. S/P DC ICD (RSB SPINE)  4. HTN  5. HLP  6 Bigeminy    Curtis Landry returns to CHF clinic for routine follow up.     Denies chest pain or anginal equivalents. No shortness of breath, CORBIN or palpitations. Denies orthopnea, PND or abdominal bloating. Reports regular walking without any issues lately. NO leg swelling or claudications. No recent falls, syncope or near syncopal events. Reports compliance with medications and dietary restrictions. NO CNS complaints to suggest TIA or CVA today. No signs of abnormal bleeding on ASA daily    Device check completed today in office, brief AFib episodes noted on interrogation. Given fall risk, low burden and anemia will continue to monitor for now and continue with BB and ASA daily.     Uses cane for fall prevention.   Has been doing well with sodium and fluid restrictions.   Denies any acute issues today in office.     9/29/2022 update    Curtis Landry returns for follow up with device check. Well functioning device without any arrhythmias or ICD firing. He is doing well without any cardiac complaints. 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     Denies chest pain or anginal equivalents. No shortness of breath, CORBIN or palpitations. Denies orthopnea, PND or abdominal bloating. Reports regular walking without any issues lately. NO leg swelling or claudications. No recent falls, syncope or near syncopal events. Reports compliance with medications and dietary restrictions. NO CNS complaints to suggest TIA or CVA today. No signs of abnormal bleeding on ASA daily.     Past Medical History:   Diagnosis Date    Anemia     Angina pectoris     Arthritis     CHF (congestive heart failure)     Glaucoma (increased  eye pressure)     Followed by outside opthalmology    HTN (hypertension)     Hyperlipidemia     Macular degeneration     Pneumonia     did not require hospitalization    Prostate cancer     Prostate cancer     Urinary incontinence        Past Surgical History:   Procedure Laterality Date    APPENDECTOMY      CARDIAC DEFIBRILLATOR PLACEMENT      CARDIAC PACEMAKER PLACEMENT      CARDIAC PACEMAKER PLACEMENT      GALLBLADDER SURGERY      PROSTATECTOMY      TOTAL HIP ARTHROPLASTY         Social History     Tobacco Use    Smoking status: Former     Years: 20.00     Types: Cigarettes     Quit date: 1980     Years since quittin.7    Smokeless tobacco: Never   Substance Use Topics    Alcohol use: No    Drug use: No       Family History   Problem Relation Age of Onset    Cancer Mother         pancreas    Cancer Father     No Known Problems Daughter     Diabetes Neg Hx     Heart disease Neg Hx     Hyperlipidemia Neg Hx     Hypertension Neg Hx     Kidney disease Neg Hx     Stroke Neg Hx      Wt Readings from Last 3 Encounters:   22 85 kg (187 lb 6.3 oz)   22 85.9 kg (189 lb 6 oz)   22 87.3 kg (192 lb 7.4 oz)     Temp Readings from Last 3 Encounters:   22 97.3 °F (36.3 °C)   22 97.7 °F (36.5 °C) (Temporal)   22 97.8 °F (36.6 °C)     BP Readings from Last 3 Encounters:   22 138/86   22 120/80   22 (!) 148/80     Pulse Readings from Last 3 Encounters:   22 76   22 80   22 70     Current Outpatient Medications on File Prior to Visit   Medication Sig Dispense Refill    acetaminophen (TYLENOL) 650 MG TbSR Take 1 tablet (650 mg total) by mouth every 8 (eight) hours.  0    aspirin (ECOTRIN) 81 MG EC tablet Take by mouth. 1 Tablet, Delayed Release (E.C.) Oral Every day      carvediloL (COREG) 12.5 MG tablet Take 1 tablet (12.5 mg total) by mouth 2 (two) times daily. 180 tablet 3    dorzolamide-timolol 2-0.5% (COSOPT) 22.3-6.8 mg/mL ophthalmic solution Place  "1 drop into both eyes 2 (two) times daily.  4    losartan (COZAAR) 50 MG tablet Take 1 tablet (50 mg total) by mouth once daily. 90 tablet 3    travoprost (TRAVATAN Z) 0.004 % ophthalmic solution Inject into the eye. 1 Drops Ophthalmic At bedtime      ferrous gluconate (FERGON) 240 (27 FE) MG tablet Take 240 mg by mouth every other day.       No current facility-administered medications on file prior to visit.       Review of Systems   Constitutional: Positive for malaise/fatigue.   HENT:  Negative for hearing loss and hoarse voice.    Eyes:  Negative for blurred vision and visual disturbance.   Cardiovascular:  Negative for chest pain, claudication, dyspnea on exertion, irregular heartbeat, leg swelling, near-syncope, orthopnea, palpitations, paroxysmal nocturnal dyspnea and syncope.   Respiratory:  Negative for cough, hemoptysis, shortness of breath, sleep disturbances due to breathing, snoring and wheezing.    Endocrine: Negative for cold intolerance and heat intolerance.   Hematologic/Lymphatic: Bruises/bleeds easily.   Skin:  Negative for color change, dry skin and nail changes.   Musculoskeletal:  Positive for arthritis and back pain. Negative for joint pain and myalgias.   Gastrointestinal:  Negative for bloating, abdominal pain, constipation, nausea and vomiting.   Genitourinary:  Negative for dysuria, flank pain, hematuria and hesitancy.   Neurological:  Negative for headaches, light-headedness, loss of balance, numbness, paresthesias and weakness.   Psychiatric/Behavioral:  Negative for altered mental status.    Allergic/Immunologic: Negative for environmental allergies.   /86 (BP Location: Right arm, Patient Position: Sitting)   Pulse 76   Ht 5' 6" (1.676 m)   Wt 85 kg (187 lb 6.3 oz)   SpO2 98%   BMI 30.25 kg/m²     Objective:   Physical Exam  Vitals and nursing note reviewed.   Constitutional:       General: He is not in acute distress.     Appearance: Normal appearance. He is well-developed. " He is not ill-appearing.   HENT:      Head: Normocephalic and atraumatic.      Nose: Nose normal.      Mouth/Throat:      Mouth: Mucous membranes are moist.   Eyes:      Pupils: Pupils are equal, round, and reactive to light.   Neck:      Thyroid: No thyromegaly.      Vascular: No JVD.      Trachea: No tracheal deviation.   Cardiovascular:      Rate and Rhythm: Normal rate and regular rhythm.      Chest Wall: PMI is not displaced.      Pulses: Intact distal pulses.           Radial pulses are 2+ on the right side and 2+ on the left side.        Dorsalis pedis pulses are 2+ on the right side and 2+ on the left side.      Heart sounds: S1 normal and S2 normal. Heart sounds not distant. No murmur heard.     Comments: Left Chest ICD site in excellent repair  No recent firing.   Pulmonary:      Effort: Pulmonary effort is normal. No respiratory distress.      Breath sounds: Normal breath sounds and air entry. No decreased breath sounds, wheezing or rales.   Abdominal:      General: Bowel sounds are normal.      Palpations: Abdomen is soft.   Musculoskeletal:         General: No swelling. Normal range of motion.      Cervical back: Full passive range of motion without pain, normal range of motion and neck supple.      Right ankle: No swelling.      Left ankle: No swelling.   Skin:     General: Skin is warm and dry.      Capillary Refill: Capillary refill takes less than 2 seconds.      Nails: There is no clubbing.   Neurological:      General: No focal deficit present.      Mental Status: He is alert and oriented to person, place, and time.   Psychiatric:         Mood and Affect: Mood normal.         Speech: Speech normal.         Behavior: Behavior normal.         Thought Content: Thought content normal.         Judgment: Judgment normal.       Lab Results   Component Value Date    CHOL 205 (H) 03/24/2022    CHOL 213 (H) 10/02/2020    CHOL 147 11/26/2018     Lab Results   Component Value Date    HDL 33 (L) 03/24/2022     HDL 35 (L) 10/02/2020    HDL 36 (L) 11/26/2018     Lab Results   Component Value Date    LDLCALC 132.6 03/24/2022    LDLCALC 137.2 10/02/2020    LDLCALC 83.8 11/26/2018     Lab Results   Component Value Date    TRIG 197 (H) 03/24/2022    TRIG 204 (H) 10/02/2020    TRIG 136 11/26/2018     Lab Results   Component Value Date    CHOLHDL 16.1 (L) 03/24/2022    CHOLHDL 16.4 (L) 10/02/2020    CHOLHDL 24.5 11/26/2018       Chemistry        Component Value Date/Time     03/24/2022 0743    K 3.9 03/24/2022 0743     03/24/2022 0743    CO2 26 03/24/2022 0743    BUN 12 03/24/2022 0743    CREATININE 1.2 03/24/2022 0743    GLU 95 03/24/2022 0743        Component Value Date/Time    CALCIUM 10.1 03/24/2022 0743    ALKPHOS 47 (L) 03/24/2022 0743    AST 17 03/24/2022 0743    ALT 11 03/24/2022 0743    BILITOT 0.7 03/24/2022 0743    ESTGFRAFRICA >60.0 03/24/2022 0743    EGFRNONAA 56.4 (A) 03/24/2022 0743          Lab Results   Component Value Date    TSH 2.288 03/24/2022     Lab Results   Component Value Date    INR 1.1 01/18/2021    INR 1.1 05/14/2016    INR 1.1 01/31/2013     Lab Results   Component Value Date    WBC 5.29 03/24/2022    HGB 12.3 (L) 03/24/2022    HCT 36.8 (L) 03/24/2022    MCV 93 03/24/2022     03/24/2022      1. TTE  Results for orders placed during the hospital encounter of 06/03/20    Echo Color Flow Doppler? Yes    Interpretation Summary  · Concentric left ventricular remodeling.  · Septal wall has abnormal motion consistent with left bundle branch block.  · Left ventricular systolic function. The estimated ejection fraction is 50%.  · Grade I (mild) left ventricular diastolic dysfunction consistent with impaired relaxation.    Assessment:      No diagnosis found.      Plan:   NICM (nonischemic cardiomyopathy)  -continue Coreg and Losartan  -Continue routine device interrogations every 6 months  -No recent ICD firing  -Continue DASH diet,2  Gm sodium restriction  -1500 ml fluid  restriction  -Monitor BP/Hr at home    Anemia in chronic illness  -Continue Iron supplement  -Follow up with Hem/Onc as scheduled    HTN  -Continue BB, ARB      RTC in 6 months with device check      CHF SYNOPSIS:    GDMT:  ACE/ARB/ARNI:  Losartan 50 mg daily  Beta Blocker: Coreg 12.5 mg BID  MRA:  none  SGLT2: none  Diuretic:  none      Nicole May, FNP-C [facility]

## 2022-09-29 ENCOUNTER — OFFICE VISIT (OUTPATIENT)
Dept: CARDIOLOGY | Facility: CLINIC | Age: 82
End: 2022-09-29
Payer: MEDICARE

## 2022-09-29 ENCOUNTER — HOSPITAL ENCOUNTER (OUTPATIENT)
Dept: CARDIOLOGY | Facility: HOSPITAL | Age: 82
Discharge: HOME | End: 2022-09-29
Attending: NURSE PRACTITIONER
Payer: MEDICARE

## 2022-09-29 VITALS
OXYGEN SATURATION: 98 % | HEART RATE: 76 BPM | HEIGHT: 66 IN | SYSTOLIC BLOOD PRESSURE: 138 MMHG | WEIGHT: 187.38 LBS | BODY MASS INDEX: 30.11 KG/M2 | DIASTOLIC BLOOD PRESSURE: 86 MMHG

## 2022-09-29 DIAGNOSIS — I25.5 ISCHEMIC CARDIOMYOPATHY: ICD-10-CM

## 2022-09-29 DIAGNOSIS — I49.3 FREQUENT PVCS: Chronic | ICD-10-CM

## 2022-09-29 DIAGNOSIS — Z95.810 AUTOMATIC IMPLANTABLE CARDIOVERTER-DEFIBRILLATOR IN SITU: ICD-10-CM

## 2022-09-29 DIAGNOSIS — E78.2 MIXED HYPERLIPIDEMIA: ICD-10-CM

## 2022-09-29 DIAGNOSIS — I50.9 CHF (NYHA CLASS III, ACC/AHA STAGE C): ICD-10-CM

## 2022-09-29 DIAGNOSIS — N18.31 CHRONIC KIDNEY DISEASE, STAGE 3A: ICD-10-CM

## 2022-09-29 DIAGNOSIS — Z95.810 ICD (IMPLANTABLE CARDIOVERTER-DEFIBRILLATOR) IN PLACE: ICD-10-CM

## 2022-09-29 DIAGNOSIS — I50.9 CHF (NYHA CLASS III, ACC/AHA STAGE C): Primary | Chronic | ICD-10-CM

## 2022-09-29 DIAGNOSIS — I42.8 NICM (NONISCHEMIC CARDIOMYOPATHY): ICD-10-CM

## 2022-09-29 PROCEDURE — 1159F PR MEDICATION LIST DOCUMENTED IN MEDICAL RECORD: ICD-10-PCS | Mod: CPTII,S$GLB,, | Performed by: NURSE PRACTITIONER

## 2022-09-29 PROCEDURE — 3288F FALL RISK ASSESSMENT DOCD: CPT | Mod: CPTII,S$GLB,, | Performed by: NURSE PRACTITIONER

## 2022-09-29 PROCEDURE — 99999 PR PBB SHADOW E&M-EST. PATIENT-LVL IV: CPT | Mod: PBBFAC,,, | Performed by: NURSE PRACTITIONER

## 2022-09-29 PROCEDURE — 1157F PR ADVANCE CARE PLAN OR EQUIV PRESENT IN MEDICAL RECORD: ICD-10-PCS | Mod: CPTII,S$GLB,, | Performed by: NURSE PRACTITIONER

## 2022-09-29 PROCEDURE — 99499 RISK ADDL DX/OHS AUDIT: ICD-10-PCS | Mod: S$GLB,,, | Performed by: NURSE PRACTITIONER

## 2022-09-29 PROCEDURE — 1126F PR PAIN SEVERITY QUANTIFIED, NO PAIN PRESENT: ICD-10-PCS | Mod: CPTII,S$GLB,, | Performed by: NURSE PRACTITIONER

## 2022-09-29 PROCEDURE — 1157F ADVNC CARE PLAN IN RCRD: CPT | Mod: CPTII,S$GLB,, | Performed by: NURSE PRACTITIONER

## 2022-09-29 PROCEDURE — 1101F PR PT FALLS ASSESS DOC 0-1 FALLS W/OUT INJ PAST YR: ICD-10-PCS | Mod: CPTII,S$GLB,, | Performed by: NURSE PRACTITIONER

## 2022-09-29 PROCEDURE — 3079F PR MOST RECENT DIASTOLIC BLOOD PRESSURE 80-89 MM HG: ICD-10-PCS | Mod: CPTII,S$GLB,, | Performed by: NURSE PRACTITIONER

## 2022-09-29 PROCEDURE — 3075F PR MOST RECENT SYSTOLIC BLOOD PRESS GE 130-139MM HG: ICD-10-PCS | Mod: CPTII,S$GLB,, | Performed by: NURSE PRACTITIONER

## 2022-09-29 PROCEDURE — 99214 OFFICE O/P EST MOD 30 MIN: CPT | Mod: S$GLB,,, | Performed by: NURSE PRACTITIONER

## 2022-09-29 PROCEDURE — 99214 PR OFFICE/OUTPT VISIT, EST, LEVL IV, 30-39 MIN: ICD-10-PCS | Mod: S$GLB,,, | Performed by: NURSE PRACTITIONER

## 2022-09-29 PROCEDURE — 3075F SYST BP GE 130 - 139MM HG: CPT | Mod: CPTII,S$GLB,, | Performed by: NURSE PRACTITIONER

## 2022-09-29 PROCEDURE — 3288F PR FALLS RISK ASSESSMENT DOCUMENTED: ICD-10-PCS | Mod: CPTII,S$GLB,, | Performed by: NURSE PRACTITIONER

## 2022-09-29 PROCEDURE — 1159F MED LIST DOCD IN RCRD: CPT | Mod: CPTII,S$GLB,, | Performed by: NURSE PRACTITIONER

## 2022-09-29 PROCEDURE — 1126F AMNT PAIN NOTED NONE PRSNT: CPT | Mod: CPTII,S$GLB,, | Performed by: NURSE PRACTITIONER

## 2022-09-29 PROCEDURE — 93283 PRGRMG EVAL IMPLANTABLE DFB: CPT

## 2022-09-29 PROCEDURE — 99499 UNLISTED E&M SERVICE: CPT | Mod: S$GLB,,, | Performed by: NURSE PRACTITIONER

## 2022-09-29 PROCEDURE — 3079F DIAST BP 80-89 MM HG: CPT | Mod: CPTII,S$GLB,, | Performed by: NURSE PRACTITIONER

## 2022-09-29 PROCEDURE — 93283 PRGRMG EVAL IMPLANTABLE DFB: CPT | Mod: 26,,, | Performed by: NURSE PRACTITIONER

## 2022-09-29 PROCEDURE — 1101F PT FALLS ASSESS-DOCD LE1/YR: CPT | Mod: CPTII,S$GLB,, | Performed by: NURSE PRACTITIONER

## 2022-09-29 PROCEDURE — 93283 CARDIAC DEVICE CHECK - IN CLINIC & HOSPITAL: ICD-10-PCS | Mod: 26,,, | Performed by: NURSE PRACTITIONER

## 2022-09-29 PROCEDURE — 99999 PR PBB SHADOW E&M-EST. PATIENT-LVL IV: ICD-10-PCS | Mod: PBBFAC,,, | Performed by: NURSE PRACTITIONER

## 2022-11-14 ENCOUNTER — LAB VISIT (OUTPATIENT)
Dept: LAB | Facility: HOSPITAL | Age: 82
End: 2022-11-14
Attending: NURSE PRACTITIONER
Payer: MEDICARE

## 2022-11-14 DIAGNOSIS — D47.2 MGUS (MONOCLONAL GAMMOPATHY OF UNKNOWN SIGNIFICANCE): ICD-10-CM

## 2022-11-14 DIAGNOSIS — D47.2 SMOLDERING MYELOMA: ICD-10-CM

## 2022-11-14 LAB
ALBUMIN SERPL BCP-MCNC: 4 G/DL (ref 3.5–5.2)
ALP SERPL-CCNC: 54 U/L (ref 55–135)
ALT SERPL W/O P-5'-P-CCNC: 10 U/L (ref 10–44)
ANION GAP SERPL CALC-SCNC: 13 MMOL/L (ref 8–16)
AST SERPL-CCNC: 13 U/L (ref 10–40)
BASOPHILS # BLD AUTO: 0.03 K/UL (ref 0–0.2)
BASOPHILS NFR BLD: 0.6 % (ref 0–1.9)
BILIRUB SERPL-MCNC: 0.7 MG/DL (ref 0.1–1)
BUN SERPL-MCNC: 11 MG/DL (ref 8–23)
CALCIUM SERPL-MCNC: 10 MG/DL (ref 8.7–10.5)
CHLORIDE SERPL-SCNC: 107 MMOL/L (ref 95–110)
CO2 SERPL-SCNC: 23 MMOL/L (ref 23–29)
CREAT SERPL-MCNC: 1.2 MG/DL (ref 0.5–1.4)
DIFFERENTIAL METHOD: ABNORMAL
EOSINOPHIL # BLD AUTO: 0.1 K/UL (ref 0–0.5)
EOSINOPHIL NFR BLD: 1.8 % (ref 0–8)
ERYTHROCYTE [DISTWIDTH] IN BLOOD BY AUTOMATED COUNT: 12.1 % (ref 11.5–14.5)
EST. GFR  (NO RACE VARIABLE): >60 ML/MIN/1.73 M^2
GLUCOSE SERPL-MCNC: 95 MG/DL (ref 70–110)
HCT VFR BLD AUTO: 35.6 % (ref 40–54)
HGB BLD-MCNC: 12.5 G/DL (ref 14–18)
IGA SERPL-MCNC: 179 MG/DL (ref 40–350)
IGG SERPL-MCNC: 2260 MG/DL (ref 650–1600)
IGM SERPL-MCNC: 39 MG/DL (ref 50–300)
IMM GRANULOCYTES # BLD AUTO: 0 K/UL (ref 0–0.04)
IMM GRANULOCYTES NFR BLD AUTO: 0 % (ref 0–0.5)
LYMPHOCYTES # BLD AUTO: 2.3 K/UL (ref 1–4.8)
LYMPHOCYTES NFR BLD: 45.3 % (ref 18–48)
MCH RBC QN AUTO: 31.1 PG (ref 27–31)
MCHC RBC AUTO-ENTMCNC: 35.1 G/DL (ref 32–36)
MCV RBC AUTO: 89 FL (ref 82–98)
MONOCYTES # BLD AUTO: 0.4 K/UL (ref 0.3–1)
MONOCYTES NFR BLD: 7.6 % (ref 4–15)
NEUTROPHILS # BLD AUTO: 2.3 K/UL (ref 1.8–7.7)
NEUTROPHILS NFR BLD: 44.7 % (ref 38–73)
NRBC BLD-RTO: 0 /100 WBC
PLATELET # BLD AUTO: 205 K/UL (ref 150–450)
PMV BLD AUTO: 8.3 FL (ref 9.2–12.9)
POTASSIUM SERPL-SCNC: 3.3 MMOL/L (ref 3.5–5.1)
PROT SERPL-MCNC: 8.6 G/DL (ref 6–8.4)
RBC # BLD AUTO: 4.02 M/UL (ref 4.6–6.2)
SODIUM SERPL-SCNC: 143 MMOL/L (ref 136–145)
WBC # BLD AUTO: 5.03 K/UL (ref 3.9–12.7)

## 2022-11-14 PROCEDURE — 84165 PROTEIN E-PHORESIS SERUM: CPT | Mod: 26,,, | Performed by: PATHOLOGY

## 2022-11-14 PROCEDURE — 86334 PATHOLOGIST INTERPRETATION IFE: ICD-10-PCS | Mod: 26,,, | Performed by: PATHOLOGY

## 2022-11-14 PROCEDURE — 84165 PATHOLOGIST INTERPRETATION SPE: ICD-10-PCS | Mod: 26,,, | Performed by: PATHOLOGY

## 2022-11-14 PROCEDURE — 80053 COMPREHEN METABOLIC PANEL: CPT | Performed by: NURSE PRACTITIONER

## 2022-11-14 PROCEDURE — 84165 PROTEIN E-PHORESIS SERUM: CPT | Performed by: NURSE PRACTITIONER

## 2022-11-14 PROCEDURE — 86334 IMMUNOFIX E-PHORESIS SERUM: CPT | Performed by: NURSE PRACTITIONER

## 2022-11-14 PROCEDURE — 85025 COMPLETE CBC W/AUTO DIFF WBC: CPT | Performed by: NURSE PRACTITIONER

## 2022-11-14 PROCEDURE — 82784 ASSAY IGA/IGD/IGG/IGM EACH: CPT | Performed by: NURSE PRACTITIONER

## 2022-11-14 PROCEDURE — 36415 COLL VENOUS BLD VENIPUNCTURE: CPT | Performed by: NURSE PRACTITIONER

## 2022-11-14 PROCEDURE — 83521 IG LIGHT CHAINS FREE EACH: CPT | Mod: 59 | Performed by: NURSE PRACTITIONER

## 2022-11-14 PROCEDURE — 86334 IMMUNOFIX E-PHORESIS SERUM: CPT | Mod: 26,,, | Performed by: PATHOLOGY

## 2022-11-15 ENCOUNTER — OFFICE VISIT (OUTPATIENT)
Dept: HEMATOLOGY/ONCOLOGY | Facility: CLINIC | Age: 82
End: 2022-11-15
Payer: MEDICARE

## 2022-11-15 VITALS
BODY MASS INDEX: 30.07 KG/M2 | OXYGEN SATURATION: 96 % | DIASTOLIC BLOOD PRESSURE: 83 MMHG | HEART RATE: 88 BPM | SYSTOLIC BLOOD PRESSURE: 136 MMHG | TEMPERATURE: 97 F | WEIGHT: 186.31 LBS

## 2022-11-15 DIAGNOSIS — D47.2 MGUS (MONOCLONAL GAMMOPATHY OF UNKNOWN SIGNIFICANCE): Primary | ICD-10-CM

## 2022-11-15 LAB
ALBUMIN SERPL ELPH-MCNC: 4.39 G/DL (ref 3.35–5.55)
ALPHA1 GLOB SERPL ELPH-MCNC: 0.28 G/DL (ref 0.17–0.41)
ALPHA2 GLOB SERPL ELPH-MCNC: 0.79 G/DL (ref 0.43–0.99)
B-GLOBULIN SERPL ELPH-MCNC: 0.71 G/DL (ref 0.5–1.1)
GAMMA GLOB SERPL ELPH-MCNC: 1.93 G/DL (ref 0.67–1.58)
INTERPRETATION SERPL IFE-IMP: NORMAL
KAPPA LC SER QL IA: 2.29 MG/DL (ref 0.33–1.94)
KAPPA LC/LAMBDA SER IA: 0.55 (ref 0.26–1.65)
LAMBDA LC SER QL IA: 4.18 MG/DL (ref 0.57–2.63)
PATHOLOGIST INTERPRETATION IFE: NORMAL
PROT SERPL-MCNC: 8.1 G/DL (ref 6–8.4)

## 2022-11-15 PROCEDURE — 1160F PR REVIEW ALL MEDS BY PRESCRIBER/CLIN PHARMACIST DOCUMENTED: ICD-10-PCS | Mod: CPTII,S$GLB,, | Performed by: NURSE PRACTITIONER

## 2022-11-15 PROCEDURE — 3288F PR FALLS RISK ASSESSMENT DOCUMENTED: ICD-10-PCS | Mod: CPTII,S$GLB,, | Performed by: NURSE PRACTITIONER

## 2022-11-15 PROCEDURE — 1159F MED LIST DOCD IN RCRD: CPT | Mod: CPTII,S$GLB,, | Performed by: NURSE PRACTITIONER

## 2022-11-15 PROCEDURE — 1101F PR PT FALLS ASSESS DOC 0-1 FALLS W/OUT INJ PAST YR: ICD-10-PCS | Mod: CPTII,S$GLB,, | Performed by: NURSE PRACTITIONER

## 2022-11-15 PROCEDURE — 1160F RVW MEDS BY RX/DR IN RCRD: CPT | Mod: CPTII,S$GLB,, | Performed by: NURSE PRACTITIONER

## 2022-11-15 PROCEDURE — 3079F DIAST BP 80-89 MM HG: CPT | Mod: CPTII,S$GLB,, | Performed by: NURSE PRACTITIONER

## 2022-11-15 PROCEDURE — 3288F FALL RISK ASSESSMENT DOCD: CPT | Mod: CPTII,S$GLB,, | Performed by: NURSE PRACTITIONER

## 2022-11-15 PROCEDURE — 3075F PR MOST RECENT SYSTOLIC BLOOD PRESS GE 130-139MM HG: ICD-10-PCS | Mod: CPTII,S$GLB,, | Performed by: NURSE PRACTITIONER

## 2022-11-15 PROCEDURE — 1126F PR PAIN SEVERITY QUANTIFIED, NO PAIN PRESENT: ICD-10-PCS | Mod: CPTII,S$GLB,, | Performed by: NURSE PRACTITIONER

## 2022-11-15 PROCEDURE — 99214 PR OFFICE/OUTPT VISIT, EST, LEVL IV, 30-39 MIN: ICD-10-PCS | Mod: S$GLB,,, | Performed by: NURSE PRACTITIONER

## 2022-11-15 PROCEDURE — 99999 PR PBB SHADOW E&M-EST. PATIENT-LVL III: CPT | Mod: PBBFAC,,, | Performed by: NURSE PRACTITIONER

## 2022-11-15 PROCEDURE — 99999 PR PBB SHADOW E&M-EST. PATIENT-LVL III: ICD-10-PCS | Mod: PBBFAC,,, | Performed by: NURSE PRACTITIONER

## 2022-11-15 PROCEDURE — 1157F PR ADVANCE CARE PLAN OR EQUIV PRESENT IN MEDICAL RECORD: ICD-10-PCS | Mod: CPTII,S$GLB,, | Performed by: NURSE PRACTITIONER

## 2022-11-15 PROCEDURE — 99214 OFFICE O/P EST MOD 30 MIN: CPT | Mod: S$GLB,,, | Performed by: NURSE PRACTITIONER

## 2022-11-15 PROCEDURE — 3075F SYST BP GE 130 - 139MM HG: CPT | Mod: CPTII,S$GLB,, | Performed by: NURSE PRACTITIONER

## 2022-11-15 PROCEDURE — 1159F PR MEDICATION LIST DOCUMENTED IN MEDICAL RECORD: ICD-10-PCS | Mod: CPTII,S$GLB,, | Performed by: NURSE PRACTITIONER

## 2022-11-15 PROCEDURE — 3079F PR MOST RECENT DIASTOLIC BLOOD PRESSURE 80-89 MM HG: ICD-10-PCS | Mod: CPTII,S$GLB,, | Performed by: NURSE PRACTITIONER

## 2022-11-15 PROCEDURE — 1101F PT FALLS ASSESS-DOCD LE1/YR: CPT | Mod: CPTII,S$GLB,, | Performed by: NURSE PRACTITIONER

## 2022-11-15 PROCEDURE — 1157F ADVNC CARE PLAN IN RCRD: CPT | Mod: CPTII,S$GLB,, | Performed by: NURSE PRACTITIONER

## 2022-11-15 PROCEDURE — 1126F AMNT PAIN NOTED NONE PRSNT: CPT | Mod: CPTII,S$GLB,, | Performed by: NURSE PRACTITIONER

## 2022-11-15 NOTE — ASSESSMENT & PLAN NOTE
Laboratory review stable. No CRAB criteria to suggest progression. No B symptoms. Mild anemia unchanged from baseline 11-12 range. No other cytopenias    F/u 6 months with CBC, CMP, POLLY, FLC, Quantitative Immunoglobulins labs 1 week prior. Discussed S&S to report sooner

## 2022-11-15 NOTE — PROGRESS NOTES
Subjective:       Patient ID: Curtis Landry is a 81 y.o. male.    Chief Complaint: Review labs. H/o MGUS    HPI: 81 y.o male presenting today for follow up of his previously diagnosed MGUS, chronic anemia. This a former patient of Dr. La who has a hx of chronic anemia and a MGUs of many years drartion. He had a bone marrow in  that failed to show myeloma or other abnormalities. Of note he has a hx of prostate cancer s/p prostatectomy 2001.    He feels well and is without complaints. He denies any anemia or B symptoms. Reports doing well since most recent visit. He presents accompanied by a family member.  Social History     Socioeconomic History    Marital status:     Number of children: 1    Highest education level: 8th grade   Tobacco Use    Smoking status: Former     Years: .00     Types: Cigarettes     Quit date: 1980     Years since quittin.9    Smokeless tobacco: Never   Substance and Sexual Activity    Alcohol use: No    Drug use: No    Sexual activity: Not Currently     Partners: Female     Social Determinants of Health     Financial Resource Strain: Low Risk     Difficulty of Paying Living Expenses: Not hard at all   Food Insecurity: No Food Insecurity    Worried About Running Out of Food in the Last Year: Never true    Ran Out of Food in the Last Year: Never true   Transportation Needs: No Transportation Needs    Lack of Transportation (Medical): No    Lack of Transportation (Non-Medical): No   Physical Activity: Insufficiently Active    Days of Exercise per Week: 3 days    Minutes of Exercise per Session: 20 min   Stress: No Stress Concern Present    Feeling of Stress : Not at all   Social Connections: Moderately Isolated    Frequency of Communication with Friends and Family: More than three times a week    Frequency of Social Gatherings with Friends and Family: More than three times a week    Attends [employer] Services: More than 4 times per year    Active Member of  Clubs or Organizations: No    Attends Club or Organization Meetings: Never    Marital Status:    Housing Stability: Low Risk     Unable to Pay for Housing in the Last Year: No    Number of Places Lived in the Last Year: 1    Unstable Housing in the Last Year: No       Past Medical History:   Diagnosis Date    Anemia     Angina pectoris     Arthritis     CHF (congestive heart failure)     Glaucoma (increased eye pressure)     Followed by outside opthalmology    HTN (hypertension)     Hyperlipidemia     Macular degeneration     Pneumonia     did not require hospitalization    Prostate cancer     Prostate cancer     Urinary incontinence        Family History   Problem Relation Age of Onset    Cancer Mother         pancreas    Cancer Father     No Known Problems Daughter     Diabetes Neg Hx     Heart disease Neg Hx     Hyperlipidemia Neg Hx     Hypertension Neg Hx     Kidney disease Neg Hx     Stroke Neg Hx        Past Surgical History:   Procedure Laterality Date    APPENDECTOMY      CARDIAC DEFIBRILLATOR PLACEMENT      CARDIAC PACEMAKER PLACEMENT      CARDIAC PACEMAKER PLACEMENT      GALLBLADDER SURGERY      PROSTATECTOMY      TOTAL HIP ARTHROPLASTY         Review of Systems   Constitutional:  Negative for activity change, appetite change, chills, fatigue, fever and unexpected weight change.   HENT:  Negative for congestion, mouth sores, nosebleeds, sore throat, trouble swallowing and voice change.    Eyes:  Negative for visual disturbance.   Respiratory:  Negative for cough, chest tightness, shortness of breath and wheezing.    Cardiovascular:  Negative for chest pain and leg swelling.   Gastrointestinal:  Negative for abdominal distention, abdominal pain, blood in stool, constipation, diarrhea, nausea and vomiting.   Genitourinary:  Negative for difficulty urinating, dysuria and hematuria.   Musculoskeletal:  Positive for gait problem (utilizes cane). Negative for arthralgias, back pain and myalgias.   Skin:   Negative for pallor, rash and wound.   Neurological:  Negative for dizziness, syncope, weakness and headaches.   Hematological:  Negative for adenopathy. Does not bruise/bleed easily.   Psychiatric/Behavioral:  The patient is nervous/anxious.        Medication List with Changes/Refills   Current Medications    ACETAMINOPHEN (TYLENOL) 650 MG TBSR    Take 1 tablet (650 mg total) by mouth every 8 (eight) hours.    ASPIRIN (ECOTRIN) 81 MG EC TABLET    Take by mouth. 1 Tablet, Delayed Release (E.C.) Oral Every day    CARVEDILOL (COREG) 12.5 MG TABLET    Take 1 tablet (12.5 mg total) by mouth 2 (two) times daily.    DORZOLAMIDE-TIMOLOL 2-0.5% (COSOPT) 22.3-6.8 MG/ML OPHTHALMIC SOLUTION    Place 1 drop into both eyes 2 (two) times daily.    FERROUS GLUCONATE (FERGON) 240 (27 FE) MG TABLET    Take 240 mg by mouth every other day.    LOSARTAN (COZAAR) 50 MG TABLET    Take 1 tablet (50 mg total) by mouth once daily.    TRAVOPROST (TRAVATAN Z) 0.004 % OPHTHALMIC SOLUTION    Inject into the eye. 1 Drops Ophthalmic At bedtime     Objective:     Vitals:    11/15/22 1348   BP: 136/83   Pulse: 88   Temp: 97.3 °F (36.3 °C)     Lab Results   Component Value Date    WBC 5.03 11/14/2022    HGB 12.5 (L) 11/14/2022    HCT 35.6 (L) 11/14/2022    MCV 89 11/14/2022     11/14/2022       BMP  Lab Results   Component Value Date     11/14/2022    K 3.3 (L) 11/14/2022     11/14/2022    CO2 23 11/14/2022    BUN 11 11/14/2022    CREATININE 1.2 11/14/2022    CALCIUM 10.0 11/14/2022    ANIONGAP 13 11/14/2022    EGFRNORACEVR >60.0 11/14/2022     Lab Results   Component Value Date    ALT 10 11/14/2022    AST 13 11/14/2022    ALKPHOS 54 (L) 11/14/2022    BILITOT 0.7 11/14/2022     Lab Results   Component Value Date    UIBC 191 04/09/2012    IRON 79 10/02/2020    TRANSFERRIN 239 10/02/2020    TIBC 354 10/02/2020    FESATURATED 22 10/02/2020          Physical Exam  Vitals reviewed.   Constitutional:       Appearance: He is  well-developed.   HENT:      Head: Normocephalic.      Right Ear: External ear normal.      Left Ear: External ear normal.   Eyes:      General: Lids are normal. No scleral icterus.        Right eye: No discharge.         Left eye: No discharge.      Conjunctiva/sclera: Conjunctivae normal.   Neck:      Thyroid: No thyroid mass.   Cardiovascular:      Rate and Rhythm: Normal rate and regular rhythm.      Heart sounds: Normal heart sounds.   Pulmonary:      Effort: Pulmonary effort is normal. No respiratory distress.      Breath sounds: Normal breath sounds. No wheezing or rales.   Abdominal:      General: Bowel sounds are normal. There is no distension.      Palpations: Abdomen is soft.      Tenderness: There is no abdominal tenderness.   Genitourinary:     Comments: deferred  Musculoskeletal:         General: Normal range of motion.      Cervical back: Normal range of motion.   Lymphadenopathy:      Head:      Right side of head: No submandibular, preauricular or posterior auricular adenopathy.      Left side of head: No submandibular, preauricular or posterior auricular adenopathy.   Skin:     General: Skin is warm and dry.   Neurological:      Mental Status: He is alert and oriented to person, place, and time.   Psychiatric:         Speech: Speech normal.         Behavior: Behavior normal. Behavior is cooperative.         Thought Content: Thought content normal.        Assessment:     Problem List Items Addressed This Visit          Oncology    MGUS (monoclonal gammopathy of unknown significance) - Primary     Laboratory review stable. No CRAB criteria to suggest progression. No B symptoms. Mild anemia unchanged from baseline 11-12 range. No other cytopenias    F/u 6 months with CBC, CMP, POLLY, FLC, Quantitative Immunoglobulins. Discussed S&S to report sooner         Relevant Orders    CBC Auto Differential    Comprehensive Metabolic Panel    Immunoglobulin free LT chains blood    Immunofixation Electrophoresis     IMMUNOGLOBULINS (IGG, IGA, IGM) QUANTITATIVE     Route Chart for Scheduling    Med Onc Chart Routing      Follow up with physician    Follow up with FIORELLA 6 months. please see plan in note   Infusion scheduling note    Injection scheduling note    Labs CMP, free light chains and SPEP   Lab interval:  please see plan in note   Imaging None      Pharmacy appointment No pharmacy appointment needed      Other referrals No additional referrals needed              Plan:     MGUS (monoclonal gammopathy of unknown significance)  -     CBC Auto Differential; Future; Expected date: 11/15/2022  -     Comprehensive Metabolic Panel; Future; Expected date: 11/15/2022  -     Immunoglobulin free LT chains blood; Future; Expected date: 11/15/2022  -     Immunofixation Electrophoresis; Future; Expected date: 11/15/2022  -     IMMUNOGLOBULINS (IGG, IGA, IGM) QUANTITATIVE; Future; Expected date: 11/15/2022          CONY Finnegan

## 2022-11-16 LAB — PATHOLOGIST INTERPRETATION SPE: NORMAL

## 2022-12-06 ENCOUNTER — OUTPATIENT CASE MANAGEMENT (OUTPATIENT)
Dept: ADMINISTRATIVE | Facility: OTHER | Age: 82
End: 2022-12-06

## 2022-12-07 NOTE — PROGRESS NOTES
Outpatient Care Management   - Care Plan Follow Up    Patient: Curtis Landry  MRN:  [id number]  Date of Service:  12/7/2022  Completed by:  Winnie Barker LCSW  Referral Date: 06/20/2022    Reason for Visit   Patient presents with    OPCM Chart Review    OPCM SW FOLLOW-UP     12/6/2022  Brief Summary:     SW telephoned in response to a call from the caregiver regarding CCW. SW provided information regarding [place] [facility].    Complex Care Plan     Care plan was discussed and completed today with input from patient and/or caregiver.    Patient Instructions     No follow-ups on file.

## 2023-01-10 ENCOUNTER — PES CALL (OUTPATIENT)
Dept: ADMINISTRATIVE | Facility: CLINIC | Age: 83
End: 2023-01-10
Payer: MEDICARE

## 2023-01-31 ENCOUNTER — DOCUMENT SCAN (OUTPATIENT)
Dept: HOME HEALTH SERVICES | Facility: HOSPITAL | Age: 83
End: 2023-01-31
Payer: MEDICARE

## 2023-02-02 ENCOUNTER — OFFICE VISIT (OUTPATIENT)
Dept: HOME HEALTH SERVICES | Facility: CLINIC | Age: 83
End: 2023-02-02
Payer: MEDICARE

## 2023-02-02 VITALS
BODY MASS INDEX: 29.89 KG/M2 | SYSTOLIC BLOOD PRESSURE: 134 MMHG | HEART RATE: 76 BPM | TEMPERATURE: 98 F | WEIGHT: 186 LBS | DIASTOLIC BLOOD PRESSURE: 75 MMHG | HEIGHT: 66 IN | OXYGEN SATURATION: 96 %

## 2023-02-02 DIAGNOSIS — H54.3 BLINDNESS OF BOTH EYES: ICD-10-CM

## 2023-02-02 DIAGNOSIS — Z85.46 HISTORY OF PROSTATE CANCER: ICD-10-CM

## 2023-02-02 DIAGNOSIS — Z95.810 AUTOMATIC IMPLANTABLE CARDIOVERTER-DEFIBRILLATOR IN SITU: ICD-10-CM

## 2023-02-02 DIAGNOSIS — R26.9 ABNORMALITY OF GAIT AND MOBILITY: ICD-10-CM

## 2023-02-02 DIAGNOSIS — E66.9 OBESITY (BMI 30.0-34.9): ICD-10-CM

## 2023-02-02 DIAGNOSIS — Z00.00 ENCOUNTER FOR PREVENTIVE HEALTH EXAMINATION: Primary | ICD-10-CM

## 2023-02-02 DIAGNOSIS — N18.31 CHRONIC KIDNEY DISEASE, STAGE 3A: ICD-10-CM

## 2023-02-02 DIAGNOSIS — Z74.09 IMPAIRED MOBILITY: ICD-10-CM

## 2023-02-02 DIAGNOSIS — M19.90 ARTHRITIS: ICD-10-CM

## 2023-02-02 DIAGNOSIS — I10 ESSENTIAL HYPERTENSION: ICD-10-CM

## 2023-02-02 DIAGNOSIS — Z99.89 DEPENDENCE ON OTHER ENABLING MACHINES AND DEVICES: ICD-10-CM

## 2023-02-02 DIAGNOSIS — D47.2 MGUS (MONOCLONAL GAMMOPATHY OF UNKNOWN SIGNIFICANCE): ICD-10-CM

## 2023-02-02 DIAGNOSIS — I50.9 CHF (NYHA CLASS III, ACC/AHA STAGE C): Chronic | ICD-10-CM

## 2023-02-02 DIAGNOSIS — I42.8 NICM (NONISCHEMIC CARDIOMYOPATHY): ICD-10-CM

## 2023-02-02 DIAGNOSIS — E78.2 MIXED HYPERLIPIDEMIA: ICD-10-CM

## 2023-02-02 DIAGNOSIS — H40.9 GLAUCOMA, UNSPECIFIED GLAUCOMA TYPE, UNSPECIFIED LATERALITY: ICD-10-CM

## 2023-02-02 DIAGNOSIS — Z74.09 OTHER REDUCED MOBILITY: ICD-10-CM

## 2023-02-02 DIAGNOSIS — D63.8 ANEMIA IN CHRONIC ILLNESS: ICD-10-CM

## 2023-02-02 DIAGNOSIS — N39.3 STRESS INCONTINENCE: ICD-10-CM

## 2023-02-02 PROBLEM — M79.651 BILATERAL THIGH PAIN: Status: RESOLVED | Noted: 2018-02-19 | Resolved: 2023-02-02

## 2023-02-02 PROBLEM — M79.652 BILATERAL THIGH PAIN: Status: RESOLVED | Noted: 2018-02-19 | Resolved: 2023-02-02

## 2023-02-02 PROBLEM — N30.00 ACUTE CYSTITIS WITHOUT HEMATURIA: Status: RESOLVED | Noted: 2021-10-20 | Resolved: 2023-02-02

## 2023-02-02 PROCEDURE — G0439 PPPS, SUBSEQ VISIT: HCPCS | Mod: S$GLB,,, | Performed by: NURSE PRACTITIONER

## 2023-02-02 PROCEDURE — 1160F PR REVIEW ALL MEDS BY PRESCRIBER/CLIN PHARMACIST DOCUMENTED: ICD-10-PCS | Mod: CPTII,S$GLB,, | Performed by: NURSE PRACTITIONER

## 2023-02-02 PROCEDURE — 3075F SYST BP GE 130 - 139MM HG: CPT | Mod: CPTII,S$GLB,, | Performed by: NURSE PRACTITIONER

## 2023-02-02 PROCEDURE — G9919 PR SCREENING AND POSITIVE: ICD-10-PCS | Mod: CPTII,S$GLB,, | Performed by: NURSE PRACTITIONER

## 2023-02-02 PROCEDURE — 1157F ADVNC CARE PLAN IN RCRD: CPT | Mod: CPTII,S$GLB,, | Performed by: NURSE PRACTITIONER

## 2023-02-02 PROCEDURE — 1101F PT FALLS ASSESS-DOCD LE1/YR: CPT | Mod: CPTII,S$GLB,, | Performed by: NURSE PRACTITIONER

## 2023-02-02 PROCEDURE — 1101F PR PT FALLS ASSESS DOC 0-1 FALLS W/OUT INJ PAST YR: ICD-10-PCS | Mod: CPTII,S$GLB,, | Performed by: NURSE PRACTITIONER

## 2023-02-02 PROCEDURE — 1159F PR MEDICATION LIST DOCUMENTED IN MEDICAL RECORD: ICD-10-PCS | Mod: CPTII,S$GLB,, | Performed by: NURSE PRACTITIONER

## 2023-02-02 PROCEDURE — G9919 SCRN ND POS ND PROV OF REC: HCPCS | Mod: CPTII,S$GLB,, | Performed by: NURSE PRACTITIONER

## 2023-02-02 PROCEDURE — 1159F MED LIST DOCD IN RCRD: CPT | Mod: CPTII,S$GLB,, | Performed by: NURSE PRACTITIONER

## 2023-02-02 PROCEDURE — 3288F FALL RISK ASSESSMENT DOCD: CPT | Mod: CPTII,S$GLB,, | Performed by: NURSE PRACTITIONER

## 2023-02-02 PROCEDURE — 3075F PR MOST RECENT SYSTOLIC BLOOD PRESS GE 130-139MM HG: ICD-10-PCS | Mod: CPTII,S$GLB,, | Performed by: NURSE PRACTITIONER

## 2023-02-02 PROCEDURE — 3078F PR MOST RECENT DIASTOLIC BLOOD PRESSURE < 80 MM HG: ICD-10-PCS | Mod: CPTII,S$GLB,, | Performed by: NURSE PRACTITIONER

## 2023-02-02 PROCEDURE — 1160F RVW MEDS BY RX/DR IN RCRD: CPT | Mod: CPTII,S$GLB,, | Performed by: NURSE PRACTITIONER

## 2023-02-02 PROCEDURE — 1170F PR FUNCTIONAL STATUS ASSESSED: ICD-10-PCS | Mod: CPTII,S$GLB,, | Performed by: NURSE PRACTITIONER

## 2023-02-02 PROCEDURE — 1157F PR ADVANCE CARE PLAN OR EQUIV PRESENT IN MEDICAL RECORD: ICD-10-PCS | Mod: CPTII,S$GLB,, | Performed by: NURSE PRACTITIONER

## 2023-02-02 PROCEDURE — G0439 PR MEDICARE ANNUAL WELLNESS SUBSEQUENT VISIT: ICD-10-PCS | Mod: S$GLB,,, | Performed by: NURSE PRACTITIONER

## 2023-02-02 PROCEDURE — 1126F AMNT PAIN NOTED NONE PRSNT: CPT | Mod: CPTII,S$GLB,, | Performed by: NURSE PRACTITIONER

## 2023-02-02 PROCEDURE — 3078F DIAST BP <80 MM HG: CPT | Mod: CPTII,S$GLB,, | Performed by: NURSE PRACTITIONER

## 2023-02-02 PROCEDURE — 1126F PR PAIN SEVERITY QUANTIFIED, NO PAIN PRESENT: ICD-10-PCS | Mod: CPTII,S$GLB,, | Performed by: NURSE PRACTITIONER

## 2023-02-02 PROCEDURE — 1170F FXNL STATUS ASSESSED: CPT | Mod: CPTII,S$GLB,, | Performed by: NURSE PRACTITIONER

## 2023-02-02 PROCEDURE — 3288F PR FALLS RISK ASSESSMENT DOCUMENTED: ICD-10-PCS | Mod: CPTII,S$GLB,, | Performed by: NURSE PRACTITIONER

## 2023-02-02 NOTE — Clinical Note
Medicare awv complete. Health maintenance:  Tetanus vaccine, COVID-19 4th vaccine, flu vaccine, moves shingles vaccine due-encouraged patient to obtain. Patient needs rollator and new ramp.  Rollator ordered and case management consulted.

## 2023-02-02 NOTE — PROGRESS NOTES
"Curtis Landry presented for Medicare AW today. The following components were reviewed and updated:    Medical history  Family History  Social history  Allergies and Current Medications  Health Risk Assessment  Health Maintenance  Care Team    **See Completed Assessments for Annual Wellness visit with in the encounter summary    The following assessments were completed:  Depression Screening  Cognitive function Screening  Timed Get Up Test  Whisper Test    linda Lee daughter on video and Sandra Rubin, caregiver present.     Vitals:    02/02/23 1016   BP: 134/75   Pulse: 76   Temp: 98.1 °F (36.7 °C)   TempSrc: Temporal   SpO2: 96%   Weight: 84.4 kg (186 lb)   Height: 5' 6" (1.676 m)     Body mass index is 30.02 kg/m².   ]    Physical Exam  Vitals reviewed.   Constitutional:       Appearance: Normal appearance.   HENT:      Head: Normocephalic and atraumatic.      Ears:      Comments: [place]  Eyes:      Comments: Legally blind   Cardiovascular:      Rate and Rhythm: Normal rate and regular rhythm.      Pulses: Normal pulses.      Heart sounds: Normal heart sounds.   Pulmonary:      Effort: Pulmonary effort is normal.      Breath sounds: Normal breath sounds.   Musculoskeletal:         General: Normal range of motion.      Cervical back: Normal range of motion and neck supple.   Skin:     General: Skin is warm and dry.   Neurological:      Mental Status: He is alert and oriented to person, place, and time.   Psychiatric:         Mood and Affect: Mood normal.         Behavior: Behavior normal.        Outpatient Medications Marked as Taking for the 2/2/23 encounter (Office Visit) with MOUNA Dumas   Medication Sig Dispense Refill    acetaminophen (TYLENOL) 650 MG TbSR Take 1 tablet (650 mg total) by mouth every 8 (eight) hours.  0    aspirin (ECOTRIN) 81 MG EC tablet Take by mouth. 1 Tablet, Delayed Release (E.C.) Oral Every day      carvediloL (COREG) 12.5 MG tablet Take 1 tablet (12.5 mg total) by mouth 2 " (two) times daily. 180 tablet 3    dorzolamide-timolol 2-0.5% (COSOPT) 22.3-6.8 mg/mL ophthalmic solution Place 1 drop into both eyes 2 (two) times daily.  4    ferrous gluconate (FERGON) 240 (27 FE) MG tablet Take 240 mg by mouth every other day.      losartan (COZAAR) 50 MG tablet Take 1 tablet (50 mg total) by mouth once daily. 90 tablet 3        Diagnoses and health risks identified today and associated recommendations/orders:  1. Encounter for preventive health examination  Medicare awv complete. Health maintenance:  Tetanus vaccine, COVID-19 4th vaccine, flu vaccine, moves shingles vaccine due-encouraged patient to obtain.  Patient needs rollator and new ramp.  Rollator ordered and case management consulted.     2. NICM (nonischemic cardiomyopathy)  Chronic and stable. No acute issues. Continue current management. See med list above. Follow up with cardiology.      3. CHF (NYHA class III, ACC/AHA stage C)  Chronic and stable. No acute issues. Continue current management. See med list above. Follow up with cardiology.      4. Chronic kidney disease, stage 3a   Latest Reference Range & Units 11/14/22 10:24   eGFR >60 mL/min/1.73 m^2 >60.0   Resolved.     6. Essential hypertension  Chronic and stable on current management. See med list above. Recommend low sodium diet. Follow up with PCP.       7. Mixed hyperlipidemia  Lab Results   Component Value Date    CHOL 205 (H) 03/24/2022    CHOL 213 (H) 10/02/2020    CHOL 147 11/26/2018     Lab Results   Component Value Date    HDL 33 (L) 03/24/2022    HDL 35 (L) 10/02/2020    HDL 36 (L) 11/26/2018     Lab Results   Component Value Date    LDLCALC 132.6 03/24/2022    LDLCALC 137.2 10/02/2020    LDLCALC 83.8 11/26/2018     Lab Results   Component Value Date    TRIG 197 (H) 03/24/2022    TRIG 204 (H) 10/02/2020    TRIG 136 11/26/2018     Lab Results   Component Value Date    CHOLHDL 16.1 (L) 03/24/2022    CHOLHDL 16.4 (L) 10/02/2020    CHOLHDL 24.5 11/26/2018    Chronic  and stable on statin medication. F/u with pcp.      8. Automatic implantable cardioverter-defibrillator in situ  Chronic and stable. No acute issues. Continue current management.  Follow up with cardiology.      9. Glaucoma, unspecified glaucoma type, unspecified laterality  Chronic and stable. No acute issues. Continue current management. See med list above. Follow up with ophthalmology.      10. Stress incontinence  Chronic and stable on current management. See med list above. Follow up with PCP.      11. History of prostate cancer   Latest Reference Range & Units 08/27/14 09:20 08/10/15 11:15 08/02/16 09:25 05/07/18 15:31 05/13/19 14:33 09/21/20 16:16 05/11/21 07:39 03/24/22 07:43   PSA Diagnostic 0.00 - 4.00 ng/mL <0.01 <0.01 <0.01 <0.01 <0.01 <0.01 <0.01 <0.01   s/p prostatectomy.     12. Anemia in chronic illness  Chronic and stable on current management. Iron every other day. See med list above. Follow up with heme/onc.     13. MGUS (monoclonal gammopathy of unknown significance)  Followed by heme/onc.     14. Obesity (BMI 30.0-34.9)  Recommend diet and exercise to lose weight. Follow up with your PCP as planned to discuss adjustments to your treatment plan.  For a    15. Blindness of both eyes   Chronic. No safety issues identified. Caregiver is with him during the day everyday. Has family close.   - Ambulatory referral/consult to Outpatient Case Management    16. Arthritis  Chronic and stable on current management. See med list above. Follow up with PCP.      17. Other reduced mobility  Chronic. Fall precautions recommended. Follow up with PCP.      18. Impaired mobility  Chronic. Fall precautions recommended. Patient needs rollator for long distances. CM consulted for rollator to help with ambulation. Order placed.  Follow up with PCP.    - Ambulatory referral/consult to Outpatient Case Management  - WALKER FOR HOME USE    19. Dependence on other enabling machines and devices  Chronic. Fall precautions  "recommended. Follow up with PCP.      20. Abnormality of gait and mobility  Chronic. Fall precautions recommended. Follow up with PCP.      21. Hearing loss  Chronic. Needs hearing aids. Patient declined at first but now agrees. Phone number for [facility] phone number given to Rosa, daughter. F/u with audiology if needed.     Dr. Agosto states, "He had a bone marrow in 2014 that failed to show myeloma or other abnorrmalitites."         Provided Curtis with a 5-10 year written screening schedule and personal prevention plan. Recommendations were developed using the USPSTF age appropriate recommendations. Education, counseling, and referrals were provided as needed.  After Visit Summary printed and given to patient which includes a list of additional screenings\tests needed.    Follow up in about 1 year (around 2/2/2024) for annual wellness visit.      Loree Christiansen, FNP    I offered to discuss advanced care planning, including how to pick a person who would make decisions for you if you were unable to make them for yourself, called a health care power of , and what kind of decisions you might make such as use of life sustaining treatments such as ventilators and tube feeding when faced with a life limiting illness recorded on a living will that they will need to know. (How you want to be cared for as you near the end of your natural life)     X  Patient has advanced directives on file, which we reviewed, and they do not wish to make changes.  "

## 2023-02-02 NOTE — PATIENT INSTRUCTIONS
Counseling and Referral of Other Preventative  (Italic type indicates deductible and co-insurance are waived)    Patient Name: Curtis Landry  Today's Date: 2/2/2023    Health Maintenance       Date Due Completion Date    TETANUS VACCINE Never done ---    COVID-19 Vaccine (4 - Booster for Pfizer series) 12/24/2021 10/29/2021    Influenza Vaccine (1) 09/01/2022 10/9/2021    Shingles Vaccine (2 of 2) 10/17/2022 8/22/2022    PROSTATE-SPECIFIC ANTIGEN 03/24/2023 3/24/2022    Lipid Panel 03/24/2023 3/24/2022        Orders Placed This Encounter   Procedures    WALKER FOR HOME USE    Ambulatory referral/consult to Outpatient Case Management       The following information is provided to all patients.  This information is to help you find resources for any of the problems found today that may be affecting your health:                Living healthy guide: www.[facility]      Understanding Diabetes: www.diabetes.org      Eating healthy: www.cdc.gov/healthyweight      [facility] home safety checklist: www.cdc.gov/steadi/patient.html      Agency on Aging: www.goea.[place].[facility]      Alcoholics anonymous (AA): www.aa.org      Physical Activity: www.rachelle.nih.gov/jy0ytgd      Tobacco use: www.quitwithusla.org

## 2023-02-14 ENCOUNTER — OUTPATIENT CASE MANAGEMENT (OUTPATIENT)
Dept: ADMINISTRATIVE | Facility: OTHER | Age: 83
End: 2023-02-14
Payer: MEDICARE

## 2023-02-16 NOTE — PROGRESS NOTES
Attempt #:  1  This LCSW attempted to reach patient/caregiver to provide resource and left a message requesting a return call.

## 2023-02-23 NOTE — PROGRESS NOTES
Evert received referral for patient from MOUNA Connell stating patient needing assistance obtaining DME . Evert contacted Pt's daughter, Rosa regarding referral. Pt's daughter reports Pt needs hearing aids and walker. Pt's daughter reports Pt has not had an appointment to address his hearing. Sw advised Pt's daughter that PCP will be notified regarding Pt's need for follow up with hearing specialist. Pt's daughter confirmed Pt has not had a walker within the last five years. Daughter advised Evert will work with PCP office to facilitate referral to [facility] for walker. Pt's daughter advised Evert will follow up with her with update once response received from PCP for appointment and DME. Pt's daughter voiced understanding and stated no other concerns.

## 2023-02-24 ENCOUNTER — TELEPHONE (OUTPATIENT)
Dept: INTERNAL MEDICINE | Facility: CLINIC | Age: 83
End: 2023-02-24
Payer: MEDICARE

## 2023-02-24 DIAGNOSIS — H91.90 HEARING LOSS, UNSPECIFIED HEARING LOSS TYPE, UNSPECIFIED LATERALITY: Primary | ICD-10-CM

## 2023-02-24 NOTE — TELEPHONE ENCOUNTER
Attempted to call pts relative who takes pt to appointments per pts MPOA directive to scheduled ENT and audiogram.  No answer, LVM to call clinic back. Ppt scheduled in earliest available slots on 03/07 at 9 am and 9:30 am.

## 2023-02-24 NOTE — TELEPHONE ENCOUNTER
----- Message from Smita Yip PA-C sent at 2/24/2023  8:48 AM CST -----  My staff will get him scheduled with ENT and audiology. Thank you.    ----- Message -----  From: Adela Gomez LPN  Sent: 2/23/2023   2:42 PM CST  To: Smita Yip PA-C    Please advise per pt portal message    ----- Message -----  From: Shannan Spears LCSW  Sent: 2/23/2023   2:40 PM CST  To: Isabel ROMO Staff    This SAMARIAW received a referral on the above patient. SAMARIAW discussed referral with Pt's daughter. Pt's daughter reports Pt needs hearing aids. She reports Pt has not had an appointment with a hearing specialist. Can you please assist Pt with referral and appointment to hearing specialist.     Thank you,    Shannan Spears LCSW

## 2023-03-03 ENCOUNTER — OUTPATIENT CASE MANAGEMENT (OUTPATIENT)
Dept: ADMINISTRATIVE | Facility: OTHER | Age: 83
End: 2023-03-03
Payer: MEDICARE

## 2023-03-03 NOTE — PROGRESS NOTES
Outpatient Care Management   - Care Plan Follow Up    Patient: Curtis Landry  MRN:  [id number]  Date of Service:  3/3/2023  Completed by:  Shannan Spears LCSW  Referral Date: 02/02/2023    Reason for Visit   Patient presents with    Update Plan Of Care     3/3/2023    Case Closure     Brief Summary: Sw received message from MOUNA Connell stating order for walker was edited with appropriate diagnosis code. Sw contacted [facility] and spoke to staff regarding order. Staff reported order was OK for processing and someone would work on processing it today. Sw completed chart review and noted Pt's PCP completed referrals to Audiology and ENT and Pt scheduled for appointments next week. Sw contacted Pt's daughter, Rosa and Ms. Cramer (Pt's transportation) to provide update. Pt's family voiced understanding regarding appointments and pending DME delivery. No other needs or concerns identified by Pt's family. Case closed.     Complex Care Plan     Care plan was discussed and completed today with input from patient and/or caregiver.    Patient Instructions     No follow-ups on file.

## 2023-03-07 ENCOUNTER — TELEPHONE (OUTPATIENT)
Dept: AUDIOLOGY | Facility: CLINIC | Age: 83
End: 2023-03-07
Payer: MEDICARE

## 2023-03-07 ENCOUNTER — CLINICAL SUPPORT (OUTPATIENT)
Dept: AUDIOLOGY | Facility: CLINIC | Age: 83
End: 2023-03-07
Payer: MEDICARE

## 2023-03-07 ENCOUNTER — OFFICE VISIT (OUTPATIENT)
Dept: OTOLARYNGOLOGY | Facility: CLINIC | Age: 83
End: 2023-03-07
Payer: MEDICARE

## 2023-03-07 VITALS — BODY MASS INDEX: 30.02 KG/M2 | HEIGHT: 66 IN

## 2023-03-07 DIAGNOSIS — H91.90 HEARING LOSS, UNSPECIFIED HEARING LOSS TYPE, UNSPECIFIED LATERALITY: ICD-10-CM

## 2023-03-07 DIAGNOSIS — H90.3 SENSORINEURAL HEARING LOSS (SNHL) OF BOTH EARS: Primary | ICD-10-CM

## 2023-03-07 DIAGNOSIS — H90.3 SENSORINEURAL HEARING LOSS, ASYMMETRICAL: Primary | ICD-10-CM

## 2023-03-07 PROCEDURE — 99203 OFFICE O/P NEW LOW 30 MIN: CPT | Mod: HCNC,S$GLB,, | Performed by: STUDENT IN AN ORGANIZED HEALTH CARE EDUCATION/TRAINING PROGRAM

## 2023-03-07 PROCEDURE — 99999 PR PBB SHADOW E&M-EST. PATIENT-LVL II: ICD-10-PCS | Mod: PBBFAC,HCNC,, | Performed by: STUDENT IN AN ORGANIZED HEALTH CARE EDUCATION/TRAINING PROGRAM

## 2023-03-07 PROCEDURE — 3288F PR FALLS RISK ASSESSMENT DOCUMENTED: ICD-10-PCS | Mod: HCNC,CPTII,S$GLB, | Performed by: STUDENT IN AN ORGANIZED HEALTH CARE EDUCATION/TRAINING PROGRAM

## 2023-03-07 PROCEDURE — 99999 PR PBB SHADOW E&M-EST. PATIENT-LVL I: ICD-10-PCS | Mod: PBBFAC,HCNC,, | Performed by: AUDIOLOGIST-HEARING AID FITTER

## 2023-03-07 PROCEDURE — 92557 COMPREHENSIVE HEARING TEST: CPT | Mod: HCNC,S$GLB,, | Performed by: AUDIOLOGIST-HEARING AID FITTER

## 2023-03-07 PROCEDURE — 1101F PR PT FALLS ASSESS DOC 0-1 FALLS W/OUT INJ PAST YR: ICD-10-PCS | Mod: HCNC,CPTII,S$GLB, | Performed by: STUDENT IN AN ORGANIZED HEALTH CARE EDUCATION/TRAINING PROGRAM

## 2023-03-07 PROCEDURE — 1101F PT FALLS ASSESS-DOCD LE1/YR: CPT | Mod: HCNC,CPTII,S$GLB, | Performed by: STUDENT IN AN ORGANIZED HEALTH CARE EDUCATION/TRAINING PROGRAM

## 2023-03-07 PROCEDURE — 92567 PR TYMPA2METRY: ICD-10-PCS | Mod: HCNC,S$GLB,, | Performed by: AUDIOLOGIST-HEARING AID FITTER

## 2023-03-07 PROCEDURE — 1126F PR PAIN SEVERITY QUANTIFIED, NO PAIN PRESENT: ICD-10-PCS | Mod: HCNC,CPTII,S$GLB, | Performed by: STUDENT IN AN ORGANIZED HEALTH CARE EDUCATION/TRAINING PROGRAM

## 2023-03-07 PROCEDURE — 1159F PR MEDICATION LIST DOCUMENTED IN MEDICAL RECORD: ICD-10-PCS | Mod: HCNC,CPTII,S$GLB, | Performed by: STUDENT IN AN ORGANIZED HEALTH CARE EDUCATION/TRAINING PROGRAM

## 2023-03-07 PROCEDURE — 99999 PR PBB SHADOW E&M-EST. PATIENT-LVL I: CPT | Mod: PBBFAC,HCNC,, | Performed by: AUDIOLOGIST-HEARING AID FITTER

## 2023-03-07 PROCEDURE — 1159F MED LIST DOCD IN RCRD: CPT | Mod: HCNC,CPTII,S$GLB, | Performed by: STUDENT IN AN ORGANIZED HEALTH CARE EDUCATION/TRAINING PROGRAM

## 2023-03-07 PROCEDURE — 99999 PR PBB SHADOW E&M-EST. PATIENT-LVL II: CPT | Mod: PBBFAC,HCNC,, | Performed by: STUDENT IN AN ORGANIZED HEALTH CARE EDUCATION/TRAINING PROGRAM

## 2023-03-07 PROCEDURE — 1157F PR ADVANCE CARE PLAN OR EQUIV PRESENT IN MEDICAL RECORD: ICD-10-PCS | Mod: HCNC,CPTII,S$GLB, | Performed by: STUDENT IN AN ORGANIZED HEALTH CARE EDUCATION/TRAINING PROGRAM

## 2023-03-07 PROCEDURE — 1157F ADVNC CARE PLAN IN RCRD: CPT | Mod: HCNC,CPTII,S$GLB, | Performed by: STUDENT IN AN ORGANIZED HEALTH CARE EDUCATION/TRAINING PROGRAM

## 2023-03-07 PROCEDURE — 92567 TYMPANOMETRY: CPT | Mod: HCNC,S$GLB,, | Performed by: AUDIOLOGIST-HEARING AID FITTER

## 2023-03-07 PROCEDURE — 1126F AMNT PAIN NOTED NONE PRSNT: CPT | Mod: HCNC,CPTII,S$GLB, | Performed by: STUDENT IN AN ORGANIZED HEALTH CARE EDUCATION/TRAINING PROGRAM

## 2023-03-07 PROCEDURE — 99203 PR OFFICE/OUTPT VISIT, NEW, LEVL III, 30-44 MIN: ICD-10-PCS | Mod: HCNC,S$GLB,, | Performed by: STUDENT IN AN ORGANIZED HEALTH CARE EDUCATION/TRAINING PROGRAM

## 2023-03-07 PROCEDURE — 92557 PR COMPREHENSIVE HEARING TEST: ICD-10-PCS | Mod: HCNC,S$GLB,, | Performed by: AUDIOLOGIST-HEARING AID FITTER

## 2023-03-07 PROCEDURE — 3288F FALL RISK ASSESSMENT DOCD: CPT | Mod: HCNC,CPTII,S$GLB, | Performed by: STUDENT IN AN ORGANIZED HEALTH CARE EDUCATION/TRAINING PROGRAM

## 2023-03-07 NOTE — PROGRESS NOTES
Chief complaint: No chief complaint on file.         Referring Provider:  Smita Yip Pa-c  [address] [place] [address]    History of Present Illness:     Mr. Landry is a 82 y.o. male presenting for evaluation of hearing loss.     He describes a equally, bilateral sided hearing loss (thinks left may be worse sometimes) starting many years ago and has been gradually worsening.      The patient reports the following risk factors for hearing loss (Negative unless checked off):   [] Familial deafness   [] Ototoxic medication exposure  [] Acoustic trauma  []  Occupational exposure  [] Head injury or trauma  [] Otologic infection  [] History of meningitis  [] Ear surgery (other than pediatric tympanostomy tubes)  [] Metabolic disease      Severity: moderate to severe  Quality: muffled  Modifying factors:  None  Associated symptoms include   [] Vertigo  [] Tinnitus  [] Otalgia  [] Drainage or pain   Previous treatments include none.    The patient denies significant eustachian tube risk factors: ear pressure, ear pain, sensation of clogging, ear symptoms with a cold or sinusitis, popping or crackling sensation, ringing in the ear, and muffled hearing.     The patient also denies pain deep within the ear, tenderness around the ear canal or pre-auricular area, or headaches.         History      Past Medical History:   Past Medical History:   Diagnosis Date    Anemia     Angina pectoris     Arthritis     CHF (congestive heart failure)     Glaucoma (increased eye pressure)     Followed by outside opthalmology    HTN (hypertension)     Hyperlipidemia     Macular degeneration     Pneumonia     did not require hospitalization    Prostate cancer     Prostate cancer     Urinary incontinence     .          Past Surgical History:  Past Surgical History:   Procedure Laterality Date    APPENDECTOMY      CARDIAC DEFIBRILLATOR PLACEMENT      CARDIAC PACEMAKER PLACEMENT      CARDIAC PACEMAKER PLACEMENT       GALLBLADDER SURGERY      PROSTATECTOMY      TOTAL HIP ARTHROPLASTY     .         Medications: Medication list was reviewed. He  has a current medication list which includes the following prescription(s): acetaminophen, aspirin, carvedilol, dorzolamide-timolol 2-0.5%, ferrous gluconate, losartan, and travoprost.         Allergies:   Review of patient's allergies indicates:   Allergen Reactions    No known drug allergies             Family history: family history includes Cancer in his father and mother; No Known Problems in his daughter.         Social History          Alcohol use:  reports no history of alcohol use.            Tobacco:  reports that he quit smoking about 43 years ago. His smoking use included cigarettes. He has a 1.00 pack-year smoking history. He has never used smokeless tobacco.         Please see the patient's intake form for full details of past medical history, past surgical history, family history, social history and review of systems. ?This information was reviewed by me and noted.      Physical Examination     General: Well developed, well nourished, well hydrated. Verbal with a strong voice and not dysphonic.     Head/Face: Normocephalic, atraumatic. No scars or lesions. Facial musculature equal.     Eyes: No scleral icterus or conjunctival hemorrhage. EOMI. PERRLA.     Ears:     Right ear: No gross deformity. EAC is clear of debris and erythema. The TM is intact with a pneumatized middle ear. No signs of retraction, fluid or infection.      Left ear: No gross deformity. EAC is clear of debris and erythema. The TM is intact with a pneumatized middle ear. No signs of retraction, fluid or infection.     Nose: No gross deformity or lesions. No purulent discharge. No significant NSD.      Mouth/Oropharynx: Lips without any lesions. No mucosal lesions within the oropharynx. No tonsillar exudate or lesions. Pharyngeal walls symmetrical. Uvula midline. Tongue midline without lesions.     Neck:  Trachea midline. No masses. No thyromegaly or nodules palpated.     Lymphatic: No lymphadenopathy in the neck.     Extremities: No cyanosis. Warm and well-perfused.     Skin: No scars or lesions on face or neck.      Neurologic: Moving all extremities without gross abnormality.CN II-XII grossly intact. House-Brackmann 1/6. No signs of nystagmus.      Psych: Alert and oriented to person, place, and time with an appropriate mood and affect.       Data Review    Review of records:      I reviewed records from the referring provider's office visits including the history, workup, and/or treatment of this problem thus far.    Audiogram     Audiogram was independently reviewed:                 Assessment/Plan:    1. Sensorineural hearing loss (SNHL) of both ears         I spent a considerable amount of time educating the patient on hearing and hearing loss.  We discussed the basic characteristics of conductive hearing loss versus sensorineural hearing loss and the significant differences in treatment options between the two categories.      We discussed that in cases of conductive hearing loss, this suggests a mechanical disorder that sometimes can be improved with medications &/or surgery.  It may occur secondary to external ear pathology (atresia, otitis externa, etc), tympanic membrane disorders (large perforations, immobility due to scarring or eustachian tube dysfunction), and middle ear disorders (effusions, ossicular disorders).    Sensorineural hearing loss is the expected hearing loss pattern with aging, but some disorders such as Meniere's may accelerate this process.  Additionally, amplification with hearing aids is generally the best option for hearing rehabilitation, except where the hearing loss is profound.  We discussed that this generally does not represent a dangerous condition, but in cases where there is a large discrepancy between the two ears in terms of nerve function, more investigation is often  necessary due to the possiblity of vestibular schwannomas or meningiomas at the cerebellopontine angle.  The definitive test for this is an MRI with gadolinium.  However, these masses are usually very slow growing (1-2mm/year), so patients may elect to repeat an audiogram in about 6 months or obtain an ABR so long as they understand that this may result in a delay in diagnosis (although very unlikely that this would have a significant clinical impact on their outcome due to the slow growing nature of these masses) with the understanding that if ABR is abnormal or asymmetry increases, an MRI would then be required.    Curtis  presents with what appears to be sensorineural hearing loss.  He is medically cleared for hearing aids. Slight asymmetry in right (not present from 2020)  - discussed MRI vs repeat audio in 1 year. He elected to proceed with continued observation with repeat audio in 1 year.         Xavi Thorpe MD  [facility]   [facility]  [id number] [place] [address].  ALEXANDRA Argueta [id number]  P: [phone]  F: [phone]

## 2023-03-07 NOTE — PROGRESS NOTES
Referring provider: Smita Edwards PA-C    Curtis Landry was seen 03/07/2023 for an audiological evaluation.  Patient complains of decline in hearing. He is accompanied by his cousin who encourages him to consider amplification. Patient has history of bilateral SNHL with his last audiogram in 2020. He denies tinnitus. Patient is blind.     Results reveal a mild-to-severe sensorineural hearing loss 250-8000 Hz bilaterally with an asymmetry for the right ear.   Speech Reception Thresholds were 40 dBHL for the right ear and 35 dBHL for the left ear.   Word recognition scores were fair for the right ear and fair for the left ear.   Tympanograms were Type A for the right ear and Type A for the left ear.    There is a decline in hearing for his right ear since his previous audiogram from 09/21/2020.   Patient and his family were counseled on the above findings.    Recommendations:  ENT for asymmetry.  Hearing aids, binaural. Patient is provided a copy of his audiogram and will check with insurance. Discussed benefit of amplification for speech and environmental awareness with his vision loss.

## 2023-03-07 NOTE — TELEPHONE ENCOUNTER
Returned call. Advised they contact [employer] to determine hearing aid benefit and in-network provider. Patient was provided a copy of his audiogram. /rt/  ----- Message from Alexandra Bella CCC-ELY sent at 3/7/2023  3:07 PM CST -----  Regarding: Didn't you see him today?  Contact: pt's daughter/Rosa    ----- Message -----  From: Christine Combs LPN  Sent: 3/7/2023  12:58 PM CST  To: [facility] Audio Clinical Staff      ----- Message -----  From: Staci Blount  Sent: 3/7/2023  12:03 PM CST  To: Maurilio Hurley Staff    Rosa is calling in regard to the pt needing hear aids and would like to know the name of the company to call for the hearing aids.  Please call her back at [phone] christine/swapna

## 2023-03-10 ENCOUNTER — OFFICE VISIT (OUTPATIENT)
Dept: INTERNAL MEDICINE | Facility: CLINIC | Age: 83
End: 2023-03-10
Payer: MEDICARE

## 2023-03-10 VITALS
WEIGHT: 185.63 LBS | DIASTOLIC BLOOD PRESSURE: 78 MMHG | BODY MASS INDEX: 29.83 KG/M2 | SYSTOLIC BLOOD PRESSURE: 130 MMHG | HEART RATE: 81 BPM | TEMPERATURE: 99 F | HEIGHT: 66 IN | OXYGEN SATURATION: 98 %

## 2023-03-10 DIAGNOSIS — N18.31 CHRONIC KIDNEY DISEASE, STAGE 3A: ICD-10-CM

## 2023-03-10 DIAGNOSIS — I42.8 NICM (NONISCHEMIC CARDIOMYOPATHY): ICD-10-CM

## 2023-03-10 DIAGNOSIS — I77.1 TORTUOUS AORTA: ICD-10-CM

## 2023-03-10 DIAGNOSIS — C90.00 MULTIPLE MYELOMA NOT HAVING ACHIEVED REMISSION: ICD-10-CM

## 2023-03-10 DIAGNOSIS — H54.3 BLINDNESS OF BOTH EYES: ICD-10-CM

## 2023-03-10 DIAGNOSIS — I50.9 CHF (NYHA CLASS III, ACC/AHA STAGE C): Primary | ICD-10-CM

## 2023-03-10 DIAGNOSIS — Z85.46 HISTORY OF PROSTATE CANCER: ICD-10-CM

## 2023-03-10 DIAGNOSIS — D47.2 MGUS (MONOCLONAL GAMMOPATHY OF UNKNOWN SIGNIFICANCE): ICD-10-CM

## 2023-03-10 DIAGNOSIS — I10 ESSENTIAL HYPERTENSION: ICD-10-CM

## 2023-03-10 DIAGNOSIS — I50.9 CHF (NYHA CLASS III, ACC/AHA STAGE C): Chronic | ICD-10-CM

## 2023-03-10 DIAGNOSIS — D63.8 ANEMIA IN CHRONIC ILLNESS: ICD-10-CM

## 2023-03-10 DIAGNOSIS — H43.11 VITREOUS HEMORRHAGE, RIGHT EYE: ICD-10-CM

## 2023-03-10 DIAGNOSIS — I20.9 ANGINA PECTORIS, UNSPECIFIED: ICD-10-CM

## 2023-03-10 DIAGNOSIS — Z00.00 ROUTINE GENERAL MEDICAL EXAMINATION AT A HEALTH CARE FACILITY: Primary | ICD-10-CM

## 2023-03-10 PROBLEM — E66.811 OBESITY (BMI 30.0-34.9): Status: RESOLVED | Noted: 2020-08-05 | Resolved: 2023-03-10

## 2023-03-10 PROBLEM — Z28.39 IMMUNIZATION DEFICIENCY: Status: RESOLVED | Noted: 2022-08-22 | Resolved: 2023-03-10

## 2023-03-10 PROBLEM — H54.7 BLIND: Status: RESOLVED | Noted: 2022-01-17 | Resolved: 2023-03-10

## 2023-03-10 PROBLEM — E66.9 OBESITY (BMI 30.0-34.9): Status: RESOLVED | Noted: 2020-08-05 | Resolved: 2023-03-10

## 2023-03-10 PROCEDURE — 3288F FALL RISK ASSESSMENT DOCD: CPT | Mod: HCNC,CPTII,S$GLB, | Performed by: PHYSICIAN ASSISTANT

## 2023-03-10 PROCEDURE — 1126F AMNT PAIN NOTED NONE PRSNT: CPT | Mod: HCNC,CPTII,S$GLB, | Performed by: PHYSICIAN ASSISTANT

## 2023-03-10 PROCEDURE — 99999 PR PBB SHADOW E&M-EST. PATIENT-LVL IV: CPT | Mod: PBBFAC,HCNC,, | Performed by: PHYSICIAN ASSISTANT

## 2023-03-10 PROCEDURE — 1160F RVW MEDS BY RX/DR IN RCRD: CPT | Mod: HCNC,CPTII,S$GLB, | Performed by: PHYSICIAN ASSISTANT

## 2023-03-10 PROCEDURE — 1101F PR PT FALLS ASSESS DOC 0-1 FALLS W/OUT INJ PAST YR: ICD-10-PCS | Mod: HCNC,CPTII,S$GLB, | Performed by: PHYSICIAN ASSISTANT

## 2023-03-10 PROCEDURE — 1159F PR MEDICATION LIST DOCUMENTED IN MEDICAL RECORD: ICD-10-PCS | Mod: HCNC,CPTII,S$GLB, | Performed by: PHYSICIAN ASSISTANT

## 2023-03-10 PROCEDURE — 1157F PR ADVANCE CARE PLAN OR EQUIV PRESENT IN MEDICAL RECORD: ICD-10-PCS | Mod: HCNC,CPTII,S$GLB, | Performed by: PHYSICIAN ASSISTANT

## 2023-03-10 PROCEDURE — 3078F DIAST BP <80 MM HG: CPT | Mod: HCNC,CPTII,S$GLB, | Performed by: PHYSICIAN ASSISTANT

## 2023-03-10 PROCEDURE — 1159F MED LIST DOCD IN RCRD: CPT | Mod: HCNC,CPTII,S$GLB, | Performed by: PHYSICIAN ASSISTANT

## 2023-03-10 PROCEDURE — 99397 PER PM REEVAL EST PAT 65+ YR: CPT | Mod: HCNC,S$GLB,, | Performed by: PHYSICIAN ASSISTANT

## 2023-03-10 PROCEDURE — 99397 PR PREVENTIVE VISIT,EST,65 & OVER: ICD-10-PCS | Mod: HCNC,S$GLB,, | Performed by: PHYSICIAN ASSISTANT

## 2023-03-10 PROCEDURE — 99999 PR PBB SHADOW E&M-EST. PATIENT-LVL IV: ICD-10-PCS | Mod: PBBFAC,HCNC,, | Performed by: PHYSICIAN ASSISTANT

## 2023-03-10 PROCEDURE — 1126F PR PAIN SEVERITY QUANTIFIED, NO PAIN PRESENT: ICD-10-PCS | Mod: HCNC,CPTII,S$GLB, | Performed by: PHYSICIAN ASSISTANT

## 2023-03-10 PROCEDURE — 3288F PR FALLS RISK ASSESSMENT DOCUMENTED: ICD-10-PCS | Mod: HCNC,CPTII,S$GLB, | Performed by: PHYSICIAN ASSISTANT

## 2023-03-10 PROCEDURE — 3075F SYST BP GE 130 - 139MM HG: CPT | Mod: HCNC,CPTII,S$GLB, | Performed by: PHYSICIAN ASSISTANT

## 2023-03-10 PROCEDURE — 3075F PR MOST RECENT SYSTOLIC BLOOD PRESS GE 130-139MM HG: ICD-10-PCS | Mod: HCNC,CPTII,S$GLB, | Performed by: PHYSICIAN ASSISTANT

## 2023-03-10 PROCEDURE — 1157F ADVNC CARE PLAN IN RCRD: CPT | Mod: HCNC,CPTII,S$GLB, | Performed by: PHYSICIAN ASSISTANT

## 2023-03-10 PROCEDURE — 3078F PR MOST RECENT DIASTOLIC BLOOD PRESSURE < 80 MM HG: ICD-10-PCS | Mod: HCNC,CPTII,S$GLB, | Performed by: PHYSICIAN ASSISTANT

## 2023-03-10 PROCEDURE — 1160F PR REVIEW ALL MEDS BY PRESCRIBER/CLIN PHARMACIST DOCUMENTED: ICD-10-PCS | Mod: HCNC,CPTII,S$GLB, | Performed by: PHYSICIAN ASSISTANT

## 2023-03-10 PROCEDURE — 1101F PT FALLS ASSESS-DOCD LE1/YR: CPT | Mod: HCNC,CPTII,S$GLB, | Performed by: PHYSICIAN ASSISTANT

## 2023-03-10 NOTE — PROGRESS NOTES
Subjective:       Patient ID: Curtis Landry is a 82 y.o. male.    Chief Complaint: Annual Exam      Patient presents to clinic today for annual physical exam.      Review of Systems   Constitutional:  Negative for chills, fatigue, fever and unexpected weight change.   HENT:  Positive for dental problem (chronic, has dentures), hearing loss (chronic) and rhinorrhea (chronic). Negative for congestion, ear pain and trouble swallowing.    Eyes:  Negative for pain.   Respiratory:  Positive for shortness of breath (chronic, intermittent, with walking). Negative for cough.    Cardiovascular:  Positive for chest pain (chronic, intermittent ,with  walking) and palpitations (chronic, intermittent, with walking). Negative for leg swelling.   Gastrointestinal:  Positive for diarrhea (chronic, intermittent). Negative for abdominal distention, abdominal pain, blood in stool, constipation, nausea and vomiting.   Genitourinary:  Negative for difficulty urinating, scrotal swelling and testicular pain.   Musculoskeletal:  Negative for arthralgias and myalgias.   Skin:  Negative for rash.   Neurological:  Positive for dizziness (chronic, intermittent). Negative for weakness, numbness and headaches.   Hematological:  Negative for adenopathy. Does not bruise/bleed easily.   Psychiatric/Behavioral:  Negative for dysphoric mood and sleep disturbance. The patient is not nervous/anxious.      Objective:      Physical Exam  Vitals and nursing note reviewed.   Constitutional:       General: He is not in acute distress.     Appearance: He is well-developed.      Comments: Needs walking stick and assistant for ambulation   HENT:      Head: Normocephalic and atraumatic.      Right Ear: Tympanic membrane, ear canal and external ear normal.      Left Ear: Tympanic membrane, ear canal and external ear normal.      Nose: Nose normal.      Mouth/Throat:      Lips: Pink.      Mouth: Mucous membranes are moist.      Pharynx: Oropharynx is clear.  Uvula midline.   Eyes:      General: Lids are normal. No scleral icterus.     Conjunctiva/sclera: Conjunctivae normal.      Pupils: Pupils are equal, round, and reactive to light.   Neck:      Thyroid: No thyromegaly.   Cardiovascular:      Rate and Rhythm: Normal rate and regular rhythm.      Pulses: Normal pulses.   Pulmonary:      Effort: Pulmonary effort is normal.      Breath sounds: Normal breath sounds. No wheezing or rales.   Abdominal:      General: Bowel sounds are normal. There is no distension.      Palpations: Abdomen is soft. There is no mass.      Tenderness: There is no abdominal tenderness.   Musculoskeletal:         General: No tenderness. Normal range of motion.      Cervical back: Normal range of motion and neck supple.      Right lower leg: No edema.      Left lower leg: No edema.   Lymphadenopathy:      Cervical: No cervical adenopathy.   Skin:     General: Skin is warm and dry.      Findings: No rash.   Neurological:      Mental Status: He is alert.      Cranial Nerves: No cranial nerve deficit.   Psychiatric:         Mood and Affect: Mood and affect normal.       Assessment:       1. Routine general medical examination at a health care facility    2. Multiple myeloma not having achieved remission    3. MGUS (monoclonal gammopathy of unknown significance)    4. CHF (NYHA class III, ACC/AHA stage C)    5. NICM (nonischemic cardiomyopathy)    6. Chronic kidney disease, stage 3a    7. Angina pectoris, unspecified    8. Vitreous hemorrhage, right eye    9. Tortuous aorta    10. Essential hypertension    11. Blindness of both eyes    12. Anemia in chronic illness    13. History of prostate cancer          Plan:   1. Routine general medical examination at a health care facility    2. Multiple myeloma not having achieved remission  Overview:  Followed by Hematology/Oncology, continue current treatment plan       3. MGUS (monoclonal gammopathy of unknown significance)  Overview:  Followed by  Hematology/Oncology, continue current treatment plan       4. CHF (NYHA class III, ACC/AHA stage C)  Overview:  Followed by Cardiology, continue current treatment plan       5. NICM (nonischemic cardiomyopathy)  Overview:  Followed by Cardiology, continue current treatment plan       6. Chronic kidney disease, stage 3a  Assessment & Plan:  Status pending lab       7. Angina pectoris, unspecified  Overview:  Followed by Cardiology, continue current treatment plan       8. Vitreous hemorrhage, right eye  Overview:  Followed by Ophthalmology, continue with current treatment plan       9. Tortuous aorta  Overview:  CXR 1/2021, on ASA      10. Essential hypertension  Assessment & Plan:  /78, controlled, continue carvedilol and losartan    Orders:  -     Comprehensive Metabolic Panel; Future; Expected date: 03/10/2023  -     Lipid Panel; Future; Expected date: 03/10/2023  -     TSH; Future; Expected date: 03/10/2023  -     CBC Auto Differential; Future; Expected date: 03/10/2023    11. Blindness of both eyes    12. Anemia in chronic illness  Assessment & Plan:  Status pending lab       13. History of prostate cancer  Overview:  Followed by Dr. Paul, Urology          Fasting labs scheduled ASAP  Check with your pharmacist regarding Tdap (tetanus/diphtheria/pertussis) vaccine.   Discussed shingrix vaccine, advised can be obtained at pharmacy.   6 month f/u with Dr. Hall scheduled  Discussed Covid booster, scheduling information given.   Health Maintenance reviewed/updated.

## 2023-03-10 NOTE — PATIENT INSTRUCTIONS
"Check with your pharmacist regarding shingrix vaccine.     Check with your pharmacist regarding Tdap (tetanus/diphtheria/pertussis) vaccine.     The bivalent covid booster is now available. You can schedule an appointment for the vaccine through [facility] or ask  to assist you with scheduling an appointment for the vaccine.    The bivalent vaccines, known as the "Omicron" vaccines, target both the original strain of COVID-19 as well as the Omicron variant, which is now the predominant strain in the [place].    The Omicron booster is authorized for individuals 12 years and older and is given two months after last dose of primary or booster shot.   "

## 2023-03-14 ENCOUNTER — LAB VISIT (OUTPATIENT)
Dept: LAB | Facility: HOSPITAL | Age: 83
End: 2023-03-14
Attending: PHYSICIAN ASSISTANT
Payer: MEDICARE

## 2023-03-14 DIAGNOSIS — I10 ESSENTIAL HYPERTENSION: ICD-10-CM

## 2023-03-14 LAB
ALBUMIN SERPL BCP-MCNC: 4 G/DL (ref 3.5–5.2)
ALP SERPL-CCNC: 59 U/L (ref 55–135)
ALT SERPL W/O P-5'-P-CCNC: 12 U/L (ref 10–44)
ANION GAP SERPL CALC-SCNC: 9 MMOL/L (ref 8–16)
AST SERPL-CCNC: 17 U/L (ref 10–40)
BILIRUB SERPL-MCNC: 0.7 MG/DL (ref 0.1–1)
BUN SERPL-MCNC: 11 MG/DL (ref 8–23)
CALCIUM SERPL-MCNC: 10.5 MG/DL (ref 8.7–10.5)
CHLORIDE SERPL-SCNC: 106 MMOL/L (ref 95–110)
CHOLEST SERPL-MCNC: 200 MG/DL (ref 120–199)
CHOLEST/HDLC SERPL: 5.9 {RATIO} (ref 2–5)
CO2 SERPL-SCNC: 25 MMOL/L (ref 23–29)
CREAT SERPL-MCNC: 1.2 MG/DL (ref 0.5–1.4)
EST. GFR  (NO RACE VARIABLE): >60 ML/MIN/1.73 M^2
GLUCOSE SERPL-MCNC: 92 MG/DL (ref 70–110)
HDLC SERPL-MCNC: 34 MG/DL (ref 40–75)
HDLC SERPL: 17 % (ref 20–50)
LDLC SERPL CALC-MCNC: 130.8 MG/DL (ref 63–159)
NONHDLC SERPL-MCNC: 166 MG/DL
POTASSIUM SERPL-SCNC: 4.2 MMOL/L (ref 3.5–5.1)
PROT SERPL-MCNC: 8.5 G/DL (ref 6–8.4)
SODIUM SERPL-SCNC: 140 MMOL/L (ref 136–145)
TRIGL SERPL-MCNC: 176 MG/DL (ref 30–150)
TSH SERPL DL<=0.005 MIU/L-ACNC: 1.56 UIU/ML (ref 0.4–4)

## 2023-03-14 PROCEDURE — 80061 LIPID PANEL: CPT | Mod: HCNC | Performed by: PHYSICIAN ASSISTANT

## 2023-03-14 PROCEDURE — 85025 COMPLETE CBC W/AUTO DIFF WBC: CPT | Mod: HCNC | Performed by: PHYSICIAN ASSISTANT

## 2023-03-14 PROCEDURE — 84443 ASSAY THYROID STIM HORMONE: CPT | Mod: HCNC | Performed by: PHYSICIAN ASSISTANT

## 2023-03-14 PROCEDURE — 80053 COMPREHEN METABOLIC PANEL: CPT | Mod: HCNC | Performed by: PHYSICIAN ASSISTANT

## 2023-03-14 PROCEDURE — 36415 COLL VENOUS BLD VENIPUNCTURE: CPT | Mod: HCNC,PO | Performed by: PHYSICIAN ASSISTANT

## 2023-03-15 LAB
BASOPHILS # BLD AUTO: 0.03 K/UL (ref 0–0.2)
BASOPHILS NFR BLD: 0.6 % (ref 0–1.9)
DIFFERENTIAL METHOD: ABNORMAL
EOSINOPHIL # BLD AUTO: 0.1 K/UL (ref 0–0.5)
EOSINOPHIL NFR BLD: 1.7 % (ref 0–8)
ERYTHROCYTE [DISTWIDTH] IN BLOOD BY AUTOMATED COUNT: 12.2 % (ref 11.5–14.5)
HCT VFR BLD AUTO: 37 % (ref 40–54)
HGB BLD-MCNC: 12 G/DL (ref 14–18)
IMM GRANULOCYTES # BLD AUTO: 0.01 K/UL (ref 0–0.04)
IMM GRANULOCYTES NFR BLD AUTO: 0.2 % (ref 0–0.5)
LYMPHOCYTES # BLD AUTO: 2 K/UL (ref 1–4.8)
LYMPHOCYTES NFR BLD: 38.1 % (ref 18–48)
MCH RBC QN AUTO: 30.5 PG (ref 27–31)
MCHC RBC AUTO-ENTMCNC: 32.4 G/DL (ref 32–36)
MCV RBC AUTO: 94 FL (ref 82–98)
MONOCYTES # BLD AUTO: 0.4 K/UL (ref 0.3–1)
MONOCYTES NFR BLD: 8 % (ref 4–15)
NEUTROPHILS # BLD AUTO: 2.8 K/UL (ref 1.8–7.7)
NEUTROPHILS NFR BLD: 51.4 % (ref 38–73)
NRBC BLD-RTO: 0 /100 WBC
PLATELET # BLD AUTO: 238 K/UL (ref 150–450)
PMV BLD AUTO: 10.2 FL (ref 9.2–12.9)
RBC # BLD AUTO: 3.93 M/UL (ref 4.6–6.2)
WBC # BLD AUTO: 5.36 K/UL (ref 3.9–12.7)

## 2023-03-28 DIAGNOSIS — I49.3 FREQUENT PVCS: Primary | ICD-10-CM

## 2023-03-28 DIAGNOSIS — I10 ESSENTIAL HYPERTENSION: ICD-10-CM

## 2023-03-29 ENCOUNTER — OFFICE VISIT (OUTPATIENT)
Dept: CARDIOLOGY | Facility: CLINIC | Age: 83
End: 2023-03-29
Payer: MEDICARE

## 2023-03-29 ENCOUNTER — HOSPITAL ENCOUNTER (OUTPATIENT)
Dept: CARDIOLOGY | Facility: HOSPITAL | Age: 83
Discharge: HOME | End: 2023-03-29
Attending: NURSE PRACTITIONER
Payer: MEDICARE

## 2023-03-29 ENCOUNTER — HOSPITAL ENCOUNTER (OUTPATIENT)
Dept: CARDIOLOGY | Facility: HOSPITAL | Age: 83
Discharge: HOME | End: 2023-03-29
Payer: MEDICARE

## 2023-03-29 VITALS
RESPIRATION RATE: 16 BRPM | BODY MASS INDEX: 30.29 KG/M2 | SYSTOLIC BLOOD PRESSURE: 126 MMHG | WEIGHT: 188.5 LBS | OXYGEN SATURATION: 99 % | HEIGHT: 66 IN | DIASTOLIC BLOOD PRESSURE: 78 MMHG | HEART RATE: 50 BPM

## 2023-03-29 DIAGNOSIS — I50.9 CHF (NYHA CLASS III, ACC/AHA STAGE C): ICD-10-CM

## 2023-03-29 DIAGNOSIS — I49.3 FREQUENT PVCS: ICD-10-CM

## 2023-03-29 DIAGNOSIS — I10 ESSENTIAL HYPERTENSION: ICD-10-CM

## 2023-03-29 DIAGNOSIS — I25.5 ISCHEMIC CARDIOMYOPATHY: ICD-10-CM

## 2023-03-29 DIAGNOSIS — Z95.810 ICD (IMPLANTABLE CARDIOVERTER-DEFIBRILLATOR) IN PLACE: ICD-10-CM

## 2023-03-29 DIAGNOSIS — I50.9 CHF (NYHA CLASS III, ACC/AHA STAGE C): Chronic | ICD-10-CM

## 2023-03-29 PROCEDURE — 3074F SYST BP LT 130 MM HG: CPT | Mod: HCNC,CPTII,S$GLB, | Performed by: NURSE PRACTITIONER

## 2023-03-29 PROCEDURE — 93283 CARDIAC DEVICE CHECK - IN CLINIC & HOSPITAL: ICD-10-PCS | Mod: 26,HCNC,, | Performed by: NURSE PRACTITIONER

## 2023-03-29 PROCEDURE — 1159F MED LIST DOCD IN RCRD: CPT | Mod: HCNC,CPTII,S$GLB, | Performed by: NURSE PRACTITIONER

## 2023-03-29 PROCEDURE — 93283 PRGRMG EVAL IMPLANTABLE DFB: CPT | Mod: 26,HCNC,, | Performed by: NURSE PRACTITIONER

## 2023-03-29 PROCEDURE — 99214 PR OFFICE/OUTPT VISIT, EST, LEVL IV, 30-39 MIN: ICD-10-PCS | Mod: HCNC,S$GLB,, | Performed by: NURSE PRACTITIONER

## 2023-03-29 PROCEDURE — 1159F PR MEDICATION LIST DOCUMENTED IN MEDICAL RECORD: ICD-10-PCS | Mod: HCNC,CPTII,S$GLB, | Performed by: NURSE PRACTITIONER

## 2023-03-29 PROCEDURE — 3074F PR MOST RECENT SYSTOLIC BLOOD PRESSURE < 130 MM HG: ICD-10-PCS | Mod: HCNC,CPTII,S$GLB, | Performed by: NURSE PRACTITIONER

## 2023-03-29 PROCEDURE — 99999 PR PBB SHADOW E&M-EST. PATIENT-LVL III: ICD-10-PCS | Mod: PBBFAC,HCNC,, | Performed by: NURSE PRACTITIONER

## 2023-03-29 PROCEDURE — 99214 OFFICE O/P EST MOD 30 MIN: CPT | Mod: HCNC,S$GLB,, | Performed by: NURSE PRACTITIONER

## 2023-03-29 PROCEDURE — 1157F ADVNC CARE PLAN IN RCRD: CPT | Mod: HCNC,CPTII,S$GLB, | Performed by: NURSE PRACTITIONER

## 2023-03-29 PROCEDURE — 1157F PR ADVANCE CARE PLAN OR EQUIV PRESENT IN MEDICAL RECORD: ICD-10-PCS | Mod: HCNC,CPTII,S$GLB, | Performed by: NURSE PRACTITIONER

## 2023-03-29 PROCEDURE — 93283 PRGRMG EVAL IMPLANTABLE DFB: CPT | Mod: HCNC

## 2023-03-29 PROCEDURE — 3078F DIAST BP <80 MM HG: CPT | Mod: HCNC,CPTII,S$GLB, | Performed by: NURSE PRACTITIONER

## 2023-03-29 PROCEDURE — 99999 PR PBB SHADOW E&M-EST. PATIENT-LVL III: CPT | Mod: PBBFAC,HCNC,, | Performed by: NURSE PRACTITIONER

## 2023-03-29 PROCEDURE — 93010 EKG 12-LEAD: ICD-10-PCS | Mod: HCNC,,, | Performed by: INTERNAL MEDICINE

## 2023-03-29 PROCEDURE — 93010 ELECTROCARDIOGRAM REPORT: CPT | Mod: HCNC,,, | Performed by: INTERNAL MEDICINE

## 2023-03-29 PROCEDURE — 93005 ELECTROCARDIOGRAM TRACING: CPT | Mod: HCNC

## 2023-03-29 PROCEDURE — 3078F PR MOST RECENT DIASTOLIC BLOOD PRESSURE < 80 MM HG: ICD-10-PCS | Mod: HCNC,CPTII,S$GLB, | Performed by: NURSE PRACTITIONER

## 2023-03-29 RX ORDER — CARVEDILOL 12.5 MG/1
12.5 TABLET ORAL 2 TIMES DAILY
Qty: 180 TABLET | Refills: 3 | Status: SHIPPED | OUTPATIENT
Start: 2023-03-29 | End: 2024-03-28

## 2023-03-29 RX ORDER — LOSARTAN POTASSIUM 50 MG/1
50 TABLET ORAL DAILY
Qty: 90 TABLET | Refills: 3 | Status: SHIPPED | OUTPATIENT
Start: 2023-03-29 | End: 2024-03-28

## 2023-03-29 NOTE — PROGRESS NOTES
Subjective:   Patient ID:  Curtis Landry is a 82 y.o. male who presents for evaluation of Follow-up and device check        HPI   Primary Cardiologist: Formerly Dr Boyd    Cardiac Problems:  1. CHF, NYHA FC III, EF recovered to 50% on 6/2020  2. NICM  3. S/P DC ICD ([employer])  4. HTN  5. HLP  6 Bigeminy    Curtis Lanrdy returns to CHF clinic for routine follow up.     Denies chest pain or anginal equivalents. No shortness of breath, CORBIN or palpitations. Denies orthopnea, PND or abdominal bloating. Reports regular walking without any issues lately. NO leg swelling or claudications. No recent falls, syncope or near syncopal events. Reports compliance with medications and dietary restrictions. NO CNS complaints to suggest TIA or CVA today. No signs of abnormal bleeding on ASA daily    Device check completed today in office, brief AFib episodes noted on interrogation. Given fall risk, low burden and anemia will continue to monitor for now and continue with BB and ASA daily.     Uses cane for fall prevention.   Has been doing well with sodium and fluid restrictions.   Denies any acute issues today in office.     9/29/2022 update    Curtis Landry returns for follow up with device check. Well functioning device without any arrhythmias or ICD firing. He is doing well without any cardiac complaints. 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     Denies chest pain or anginal equivalents. No shortness of breath, CORBIN or palpitations. Denies orthopnea, PND or abdominal bloating. Reports regular walking without any issues lately. NO leg swelling or claudications. No recent falls, syncope or near syncopal events. Reports compliance with medications and dietary restrictions. NO CNS complaints to suggest TIA or CVA today. No signs of abnormal bleeding on ASA daily.     3/29/2023 update  He returns today and states he is doing ok. He is having more shortness of breath issues with exertional activities.      He is doing exercises while sitting in the chair without any issues.     BP well controlled today.     No leg swelling on exam today.   ICD interrogation completed today- well functioning device.     NO chest pain or anginal equivalents.   No dizziness, syncope or near syncopal events.     Reports compliance with medications. Needs to be more compliant with dietary restrictions.  No signs of abnormal bleeding on ASA daily.       Past Medical History:   Diagnosis Date    Anemia     Angina pectoris     Arthritis     CHF (congestive heart failure)     Glaucoma (increased eye pressure)     Followed by outside opthalmology    HTN (hypertension)     Hyperlipidemia     Macular degeneration     Pneumonia     did not require hospitalization    Prostate cancer     Prostate cancer     Urinary incontinence        Past Surgical History:   Procedure Laterality Date    APPENDECTOMY      CARDIAC DEFIBRILLATOR PLACEMENT      CARDIAC PACEMAKER PLACEMENT      CARDIAC PACEMAKER PLACEMENT      GALLBLADDER SURGERY      PROSTATECTOMY      TOTAL HIP ARTHROPLASTY         Social History     Tobacco Use    Smoking status: Former     Packs/day: 0.10     Years: 10.00     Pack years: 1.00     Types: Cigarettes     Quit date: 1980     Years since quittin.2    Smokeless tobacco: Never   Substance Use Topics    Alcohol use: No    Drug use: No       Family History   Problem Relation Age of Onset    Cancer Mother         pancreas    Cancer Father     No Known Problems Daughter     Diabetes Neg Hx     Heart disease Neg Hx     Hyperlipidemia Neg Hx     Hypertension Neg Hx     Kidney disease Neg Hx     Stroke Neg Hx      Wt Readings from Last 3 Encounters:   23 85.5 kg (188 lb 7.9 oz)   03/10/23 84.2 kg (185 lb 10 oz)   23 84.4 kg (186 lb)     Temp Readings from Last 3 Encounters:   03/10/23 99 °F (37.2 °C) (Temporal)   23 98.1 °F (36.7 °C) (Temporal)   11/15/22 97.3 °F (36.3 °C)     BP Readings from Last 3 Encounters:    03/29/23 126/78   03/10/23 130/78   02/02/23 134/75     Pulse Readings from Last 3 Encounters:   03/29/23 (!) 50   03/10/23 81   02/02/23 76     Current Outpatient Medications on File Prior to Visit   Medication Sig Dispense Refill    acetaminophen (TYLENOL) 650 MG TbSR Take 1 tablet (650 mg total) by mouth every 8 (eight) hours.  0    aspirin (ECOTRIN) 81 MG EC tablet Take by mouth. 1 Tablet, Delayed Release (E.C.) Oral Every day      dorzolamide-timolol 2-0.5% (COSOPT) 22.3-6.8 mg/mL ophthalmic solution Place 1 drop into both eyes 2 (two) times daily.  4    travoprost (TRAVATAN Z) 0.004 % ophthalmic solution Inject into the eye. 1 Drops Ophthalmic At bedtime      [DISCONTINUED] carvediloL (COREG) 12.5 MG tablet Take 1 tablet (12.5 mg total) by mouth 2 (two) times daily. 180 tablet 3    [DISCONTINUED] losartan (COZAAR) 50 MG tablet Take 1 tablet (50 mg total) by mouth once daily. 90 tablet 3    ferrous gluconate (FERGON) 240 (27 FE) MG tablet Take 240 mg by mouth every other day.       No current facility-administered medications on file prior to visit.       Review of Systems   Constitutional: Positive for malaise/fatigue.   HENT:  Negative for hearing loss and hoarse voice.    Eyes:  Negative for blurred vision and visual disturbance.   Cardiovascular:  Negative for chest pain, claudication, dyspnea on exertion, irregular heartbeat, leg swelling, near-syncope, orthopnea, palpitations, paroxysmal nocturnal dyspnea and syncope.   Respiratory:  Negative for cough, hemoptysis, shortness of breath, sleep disturbances due to breathing, snoring and wheezing.    Endocrine: Negative for cold intolerance and heat intolerance.   Hematologic/Lymphatic: Bruises/bleeds easily.   Skin:  Negative for color change, dry skin and nail changes.   Musculoskeletal:  Positive for arthritis and back pain. Negative for joint pain and myalgias.   Gastrointestinal:  Negative for bloating, abdominal pain, constipation, nausea and  "vomiting.   Genitourinary:  Negative for dysuria, flank pain, hematuria and hesitancy.   Neurological:  Negative for headaches, light-headedness, loss of balance, numbness, paresthesias and weakness.   Psychiatric/Behavioral:  Negative for altered mental status.    Allergic/Immunologic: Negative for environmental allergies.   /78   Pulse (!) 50   Resp 16   Ht 5' 6" (1.676 m)   Wt 85.5 kg (188 lb 7.9 oz)   SpO2 99%   BMI 30.42 kg/m²     Objective:   Physical Exam  Vitals and nursing note reviewed.   Constitutional:       General: He is not in acute distress.     Appearance: Normal appearance. He is well-developed. He is not ill-appearing.   HENT:      Head: Normocephalic and atraumatic.      Nose: Nose normal.      Mouth/Throat:      Mouth: Mucous membranes are moist.   Eyes:      Pupils: Pupils are equal, round, and reactive to light.   Neck:      Thyroid: No thyromegaly.      Vascular: No JVD.      Trachea: No tracheal deviation.   Cardiovascular:      Rate and Rhythm: Normal rate and regular rhythm.      Chest Wall: PMI is not displaced.      Pulses: Intact distal pulses.           Radial pulses are 2+ on the right side and 2+ on the left side.        Dorsalis pedis pulses are 2+ on the right side and 2+ on the left side.      Heart sounds: S1 normal and S2 normal. Heart sounds not distant. No murmur heard.     Comments: Left Chest ICD site in excellent repair  No recent firing.   Pulmonary:      Effort: Pulmonary effort is normal. No respiratory distress.      Breath sounds: Normal breath sounds and air entry. No decreased breath sounds, wheezing or rales.   Abdominal:      General: Bowel sounds are normal.      Palpations: Abdomen is soft.   Musculoskeletal:         General: No swelling. Normal range of motion.      Cervical back: Full passive range of motion without pain, normal range of motion and neck supple.      Right ankle: No swelling.      Left ankle: No swelling.   Skin:     General: Skin is " warm and dry.      Capillary Refill: Capillary refill takes less than 2 seconds.      Nails: There is no clubbing.   Neurological:      General: No focal deficit present.      Mental Status: He is alert and oriented to person, place, and time.   Psychiatric:         Mood and Affect: Mood normal.         Speech: Speech normal.         Behavior: Behavior normal.         Thought Content: Thought content normal.         Judgment: Judgment normal.       Lab Results   Component Value Date    CHOL 200 (H) 03/14/2023    CHOL 205 (H) 03/24/2022    CHOL 213 (H) 10/02/2020     Lab Results   Component Value Date    HDL 34 (L) 03/14/2023    HDL 33 (L) 03/24/2022    HDL 35 (L) 10/02/2020     Lab Results   Component Value Date    LDLCALC 130.8 03/14/2023    LDLCALC 132.6 03/24/2022    LDLCALC 137.2 10/02/2020     Lab Results   Component Value Date    TRIG 176 (H) 03/14/2023    TRIG 197 (H) 03/24/2022    TRIG 204 (H) 10/02/2020     Lab Results   Component Value Date    CHOLHDL 17.0 (L) 03/14/2023    CHOLHDL 16.1 (L) 03/24/2022    CHOLHDL 16.4 (L) 10/02/2020       Chemistry        Component Value Date/Time     03/14/2023 0823    K 4.2 03/14/2023 0823     03/14/2023 0823    CO2 25 03/14/2023 0823    BUN 11 03/14/2023 0823    CREATININE 1.2 03/14/2023 0823    GLU 92 03/14/2023 0823        Component Value Date/Time    CALCIUM 10.5 03/14/2023 0823    ALKPHOS 59 03/14/2023 0823    AST 17 03/14/2023 0823    ALT 12 03/14/2023 0823    BILITOT 0.7 03/14/2023 0823    ESTGFRAFRICA >60.0 03/24/2022 0743    EGFRNONAA 56.4 (A) 03/24/2022 0743          Lab Results   Component Value Date    TSH 1.563 03/14/2023     Lab Results   Component Value Date    INR 1.1 01/18/2021    INR 1.1 05/14/2016    INR 1.1 01/31/2013     Lab Results   Component Value Date    WBC 5.36 03/14/2023    HGB 12.0 (L) 03/14/2023    HCT 37.0 (L) 03/14/2023    MCV 94 03/14/2023     03/14/2023      1. TTE  Results for orders placed during the hospital  encounter of 06/03/20    Echo Color Flow Doppler? Yes    Interpretation Summary  · Concentric left ventricular remodeling.  · Septal wall has abnormal motion consistent with left bundle branch block.  · Left ventricular systolic function. The estimated ejection fraction is 50%.  · Grade I (mild) left ventricular diastolic dysfunction consistent with impaired relaxation.    Assessment:      1. Essential hypertension    2. CHF (NYHA class III, ACC/AHA stage C)          Plan:   NICM (nonischemic cardiomyopathy)  -continue Coreg and Losartan  -Continue routine device interrogations every 6 months  -No recent ICD firing  -Continue DASH diet,2  Gm sodium restriction  -1500 ml fluid restriction  -Monitor BP/Hr at home    Anemia in chronic illness  -Continue Iron supplement  -Follow up with Hem/Onc as scheduled    HTN  -Continue BB, ARB      RTC in 6 months with device check      CHF SYNOPSIS:    GDMT:  ACE/ARB/ARNI:  Losartan 50 mg daily  Beta Blocker: Coreg 12.5 mg BID  MRA:  none  SGLT2: none  Diuretic:  none      Nicole May, FNP-C [facility]

## 2023-06-08 ENCOUNTER — LAB VISIT (OUTPATIENT)
Dept: LAB | Facility: HOSPITAL | Age: 83
End: 2023-06-08
Attending: NURSE PRACTITIONER
Payer: MEDICARE

## 2023-06-08 DIAGNOSIS — D47.2 MGUS (MONOCLONAL GAMMOPATHY OF UNKNOWN SIGNIFICANCE): ICD-10-CM

## 2023-06-08 LAB
ALBUMIN SERPL BCP-MCNC: 4 G/DL (ref 3.5–5.2)
ALP SERPL-CCNC: 52 U/L (ref 55–135)
ALT SERPL W/O P-5'-P-CCNC: 13 U/L (ref 10–44)
ANION GAP SERPL CALC-SCNC: 8 MMOL/L (ref 8–16)
AST SERPL-CCNC: 19 U/L (ref 10–40)
BASOPHILS # BLD AUTO: 0.03 K/UL (ref 0–0.2)
BASOPHILS NFR BLD: 0.5 % (ref 0–1.9)
BILIRUB SERPL-MCNC: 0.7 MG/DL (ref 0.1–1)
BUN SERPL-MCNC: 12 MG/DL (ref 8–23)
CALCIUM SERPL-MCNC: 10.3 MG/DL (ref 8.7–10.5)
CHLORIDE SERPL-SCNC: 106 MMOL/L (ref 95–110)
CO2 SERPL-SCNC: 25 MMOL/L (ref 23–29)
CREAT SERPL-MCNC: 1.2 MG/DL (ref 0.5–1.4)
DIFFERENTIAL METHOD: ABNORMAL
EOSINOPHIL # BLD AUTO: 0.1 K/UL (ref 0–0.5)
EOSINOPHIL NFR BLD: 1.6 % (ref 0–8)
ERYTHROCYTE [DISTWIDTH] IN BLOOD BY AUTOMATED COUNT: 11.8 % (ref 11.5–14.5)
EST. GFR  (NO RACE VARIABLE): >60 ML/MIN/1.73 M^2
GLUCOSE SERPL-MCNC: 95 MG/DL (ref 70–110)
HCT VFR BLD AUTO: 35 % (ref 40–54)
HGB BLD-MCNC: 11.8 G/DL (ref 14–18)
IMM GRANULOCYTES # BLD AUTO: 0.01 K/UL (ref 0–0.04)
IMM GRANULOCYTES NFR BLD AUTO: 0.2 % (ref 0–0.5)
LYMPHOCYTES # BLD AUTO: 2.3 K/UL (ref 1–4.8)
LYMPHOCYTES NFR BLD: 41.1 % (ref 18–48)
MCH RBC QN AUTO: 30.4 PG (ref 27–31)
MCHC RBC AUTO-ENTMCNC: 33.7 G/DL (ref 32–36)
MCV RBC AUTO: 90 FL (ref 82–98)
MONOCYTES # BLD AUTO: 0.5 K/UL (ref 0.3–1)
MONOCYTES NFR BLD: 9 % (ref 4–15)
NEUTROPHILS # BLD AUTO: 2.7 K/UL (ref 1.8–7.7)
NEUTROPHILS NFR BLD: 47.6 % (ref 38–73)
NRBC BLD-RTO: 0 /100 WBC
PLATELET # BLD AUTO: 207 K/UL (ref 150–450)
PMV BLD AUTO: 9.4 FL (ref 9.2–12.9)
POTASSIUM SERPL-SCNC: 4.1 MMOL/L (ref 3.5–5.1)
PROT SERPL-MCNC: 8.6 G/DL (ref 6–8.4)
RBC # BLD AUTO: 3.88 M/UL (ref 4.6–6.2)
SODIUM SERPL-SCNC: 139 MMOL/L (ref 136–145)
WBC # BLD AUTO: 5.65 K/UL (ref 3.9–12.7)

## 2023-06-08 PROCEDURE — 86334 IMMUNOFIX E-PHORESIS SERUM: CPT | Mod: 26,,, | Performed by: PATHOLOGY

## 2023-06-08 PROCEDURE — 82784 ASSAY IGA/IGD/IGG/IGM EACH: CPT | Performed by: NURSE PRACTITIONER

## 2023-06-08 PROCEDURE — 86334 PATHOLOGIST INTERPRETATION IFE: ICD-10-PCS | Mod: 26,,, | Performed by: PATHOLOGY

## 2023-06-08 PROCEDURE — 36415 COLL VENOUS BLD VENIPUNCTURE: CPT | Mod: PO | Performed by: NURSE PRACTITIONER

## 2023-06-08 PROCEDURE — 83521 IG LIGHT CHAINS FREE EACH: CPT | Mod: 59 | Performed by: NURSE PRACTITIONER

## 2023-06-08 PROCEDURE — 85025 COMPLETE CBC W/AUTO DIFF WBC: CPT | Performed by: NURSE PRACTITIONER

## 2023-06-08 PROCEDURE — 86334 IMMUNOFIX E-PHORESIS SERUM: CPT | Performed by: NURSE PRACTITIONER

## 2023-06-08 PROCEDURE — 80053 COMPREHEN METABOLIC PANEL: CPT | Performed by: NURSE PRACTITIONER

## 2023-06-09 LAB
IGA SERPL-MCNC: 164 MG/DL (ref 40–350)
IGG SERPL-MCNC: 2373 MG/DL (ref 650–1600)
IGM SERPL-MCNC: 34 MG/DL (ref 50–300)
INTERPRETATION SERPL IFE-IMP: NORMAL
KAPPA LC SER QL IA: 2.64 MG/DL (ref 0.33–1.94)
KAPPA LC/LAMBDA SER IA: 0.64 (ref 0.26–1.65)
LAMBDA LC SER QL IA: 4.12 MG/DL (ref 0.57–2.63)

## 2023-06-11 LAB — PATHOLOGIST INTERPRETATION IFE: NORMAL

## 2023-06-27 ENCOUNTER — OFFICE VISIT (OUTPATIENT)
Dept: HEMATOLOGY/ONCOLOGY | Facility: CLINIC | Age: 83
End: 2023-06-27
Payer: MEDICARE

## 2023-06-27 ENCOUNTER — HOSPITAL ENCOUNTER (OUTPATIENT)
Dept: RADIOLOGY | Facility: HOSPITAL | Age: 83
Discharge: HOME | End: 2023-06-27
Attending: NURSE PRACTITIONER
Payer: MEDICARE

## 2023-06-27 VITALS
OXYGEN SATURATION: 99 % | DIASTOLIC BLOOD PRESSURE: 86 MMHG | HEART RATE: 67 BPM | WEIGHT: 187.38 LBS | SYSTOLIC BLOOD PRESSURE: 147 MMHG | TEMPERATURE: 98 F | BODY MASS INDEX: 30.25 KG/M2

## 2023-06-27 DIAGNOSIS — D47.2 MGUS (MONOCLONAL GAMMOPATHY OF UNKNOWN SIGNIFICANCE): ICD-10-CM

## 2023-06-27 DIAGNOSIS — Z85.46 HISTORY OF PROSTATE CANCER: ICD-10-CM

## 2023-06-27 DIAGNOSIS — D47.2 SMOLDERING MYELOMA: ICD-10-CM

## 2023-06-27 DIAGNOSIS — D47.2 MGUS (MONOCLONAL GAMMOPATHY OF UNKNOWN SIGNIFICANCE): Primary | ICD-10-CM

## 2023-06-27 PROCEDURE — 99214 PR OFFICE/OUTPT VISIT, EST, LEVL IV, 30-39 MIN: ICD-10-PCS | Mod: S$GLB,,, | Performed by: NURSE PRACTITIONER

## 2023-06-27 PROCEDURE — 1126F AMNT PAIN NOTED NONE PRSNT: CPT | Mod: CPTII,S$GLB,, | Performed by: NURSE PRACTITIONER

## 2023-06-27 PROCEDURE — 1159F PR MEDICATION LIST DOCUMENTED IN MEDICAL RECORD: ICD-10-PCS | Mod: CPTII,S$GLB,, | Performed by: NURSE PRACTITIONER

## 2023-06-27 PROCEDURE — 99999 PR PBB SHADOW E&M-EST. PATIENT-LVL IV: CPT | Mod: PBBFAC,,, | Performed by: NURSE PRACTITIONER

## 2023-06-27 PROCEDURE — 99999 PR PBB SHADOW E&M-EST. PATIENT-LVL IV: ICD-10-PCS | Mod: PBBFAC,,, | Performed by: NURSE PRACTITIONER

## 2023-06-27 PROCEDURE — 1159F MED LIST DOCD IN RCRD: CPT | Mod: CPTII,S$GLB,, | Performed by: NURSE PRACTITIONER

## 2023-06-27 PROCEDURE — 3079F PR MOST RECENT DIASTOLIC BLOOD PRESSURE 80-89 MM HG: ICD-10-PCS | Mod: CPTII,S$GLB,, | Performed by: NURSE PRACTITIONER

## 2023-06-27 PROCEDURE — 73130 XR HAND COMPLETE 3 VIEW LEFT: ICD-10-PCS | Mod: 26,LT,, | Performed by: RADIOLOGY

## 2023-06-27 PROCEDURE — 1160F PR REVIEW ALL MEDS BY PRESCRIBER/CLIN PHARMACIST DOCUMENTED: ICD-10-PCS | Mod: CPTII,S$GLB,, | Performed by: NURSE PRACTITIONER

## 2023-06-27 PROCEDURE — 1160F RVW MEDS BY RX/DR IN RCRD: CPT | Mod: CPTII,S$GLB,, | Performed by: NURSE PRACTITIONER

## 2023-06-27 PROCEDURE — 99214 OFFICE O/P EST MOD 30 MIN: CPT | Mod: S$GLB,,, | Performed by: NURSE PRACTITIONER

## 2023-06-27 PROCEDURE — 3079F DIAST BP 80-89 MM HG: CPT | Mod: CPTII,S$GLB,, | Performed by: NURSE PRACTITIONER

## 2023-06-27 PROCEDURE — 73130 X-RAY EXAM OF HAND: CPT | Mod: 26,LT,, | Performed by: RADIOLOGY

## 2023-06-27 PROCEDURE — 3077F PR MOST RECENT SYSTOLIC BLOOD PRESSURE >= 140 MM HG: ICD-10-PCS | Mod: CPTII,S$GLB,, | Performed by: NURSE PRACTITIONER

## 2023-06-27 PROCEDURE — 73130 X-RAY EXAM OF HAND: CPT | Mod: TC,LT

## 2023-06-27 PROCEDURE — 1157F ADVNC CARE PLAN IN RCRD: CPT | Mod: CPTII,S$GLB,, | Performed by: NURSE PRACTITIONER

## 2023-06-27 PROCEDURE — 3077F SYST BP >= 140 MM HG: CPT | Mod: CPTII,S$GLB,, | Performed by: NURSE PRACTITIONER

## 2023-06-27 PROCEDURE — 1126F PR PAIN SEVERITY QUANTIFIED, NO PAIN PRESENT: ICD-10-PCS | Mod: CPTII,S$GLB,, | Performed by: NURSE PRACTITIONER

## 2023-06-27 PROCEDURE — 1157F PR ADVANCE CARE PLAN OR EQUIV PRESENT IN MEDICAL RECORD: ICD-10-PCS | Mod: CPTII,S$GLB,, | Performed by: NURSE PRACTITIONER

## 2023-06-27 NOTE — PROGRESS NOTES
Subjective:       Patient ID: Curtis Landry is a 82 y.o. male.    Chief Complaint: Review labs. H/o MGUS    HPI: 82 y.o male presenting today for follow up of his previously diagnosed MGUS, chronic anemia. This a former patient of Dr. La who has a hx of chronic anemia and a MGUs of many years drartion. He had a bone marrow in  that failed to show myeloma or other abnormalities. Of note he has a hx of prostate cancer s/p prostatectomy 2001.    He feels well and is without complaints. He denies any anemia or B symptoms. Reports doing well since most recent visit. He presents accompanied by a family member.  Social History     Socioeconomic History    Marital status:     Number of children: 1    Highest education level: 8th grade   Tobacco Use    Smoking status: Former     Packs/day: 0.10     Years: 10.00     Pack years: 1.00     Types: Cigarettes     Quit date: 1980     Years since quittin.5    Smokeless tobacco: Never   Substance and Sexual Activity    Alcohol use: No    Drug use: No    Sexual activity: Not Currently     Partners: Female     Social Determinants of Health     Financial Resource Strain: Low Risk     Difficulty of Paying Living Expenses: Not hard at all   Food Insecurity: No Food Insecurity    Worried About Running Out of Food in the Last Year: Never true    Ran Out of Food in the Last Year: Never true   Transportation Needs: No Transportation Needs    Lack of Transportation (Medical): No    Lack of Transportation (Non-Medical): No   Physical Activity: Insufficiently Active    Days of Exercise per Week: 7 days    Minutes of Exercise per Session: 20 min   Stress: No Stress Concern Present    Feeling of Stress : Not at all   Social Connections: Moderately Isolated    Frequency of Communication with Friends and Family: More than three times a week    Frequency of Social Gatherings with Friends and Family: More than three times a week    Attends Bahai  Services: More than 4 times per year    Active Member of Clubs or Organizations: No    Attends Club or Organization Meetings: Never    Marital Status:    Housing Stability: Low Risk     Unable to Pay for Housing in the Last Year: No    Number of Places Lived in the Last Year: 1    Unstable Housing in the Last Year: No       Past Medical History:   Diagnosis Date    Anemia     Angina pectoris     Arthritis     CHF (congestive heart failure)     Glaucoma (increased eye pressure)     Followed by outside opthalmology    HTN (hypertension)     Hyperlipidemia     Macular degeneration     Pneumonia     did not require hospitalization    Prostate cancer     Prostate cancer     Urinary incontinence        Family History   Problem Relation Age of Onset    Cancer Mother         pancreas    Cancer Father     No Known Problems Daughter     Diabetes Neg Hx     Heart disease Neg Hx     Hyperlipidemia Neg Hx     Hypertension Neg Hx     Kidney disease Neg Hx     Stroke Neg Hx        Past Surgical History:   Procedure Laterality Date    APPENDECTOMY      CARDIAC DEFIBRILLATOR PLACEMENT      CARDIAC PACEMAKER PLACEMENT      CARDIAC PACEMAKER PLACEMENT      GALLBLADDER SURGERY      PROSTATECTOMY      TOTAL HIP ARTHROPLASTY         Review of Systems   Constitutional:  Negative for activity change, appetite change, chills, fatigue, fever and unexpected weight change.   HENT:  Negative for congestion, mouth sores, nosebleeds, sore throat, trouble swallowing and voice change.    Eyes:  Negative for visual disturbance.   Respiratory:  Negative for cough, chest tightness, shortness of breath and wheezing.    Cardiovascular:  Negative for chest pain and leg swelling.   Gastrointestinal:  Negative for abdominal distention, abdominal pain, blood in stool, constipation, diarrhea, nausea and vomiting.   Genitourinary:  Negative for difficulty urinating, dysuria and hematuria.   Musculoskeletal:  Positive  for gait problem (utilizes cane). Negative for arthralgias, back pain and myalgias.   Skin:  Negative for pallor, rash and wound.   Neurological:  Negative for dizziness, syncope, weakness and headaches.   Hematological:  Negative for adenopathy. Does not bruise/bleed easily.   Psychiatric/Behavioral:  The patient is nervous/anxious.        Medication List with Changes/Refills   Current Medications    ACETAMINOPHEN (TYLENOL) 650 MG TBSR    Take 1 tablet (650 mg total) by mouth every 8 (eight) hours.    ASPIRIN (ECOTRIN) 81 MG EC TABLET    Take by mouth. 1 Tablet, Delayed Release (E.C.) Oral Every day    CARVEDILOL (COREG) 12.5 MG TABLET    Take 1 tablet (12.5 mg total) by mouth 2 (two) times daily.    DORZOLAMIDE-TIMOLOL 2-0.5% (COSOPT) 22.3-6.8 MG/ML OPHTHALMIC SOLUTION    Place 1 drop into both eyes 2 (two) times daily.    FERROUS GLUCONATE (FERGON) 240 (27 FE) MG TABLET    Take 240 mg by mouth every other day.    LOSARTAN (COZAAR) 50 MG TABLET    Take 1 tablet (50 mg total) by mouth once daily.    TRAVOPROST (TRAVATAN Z) 0.004 % OPHTHALMIC SOLUTION    Inject into the eye. 1 Drops Ophthalmic At bedtime     Objective:     Vitals:    06/27/23 0854   BP: (!) 147/86   Pulse: 67   Temp: 97.5 °F (36.4 °C)     Lab Results   Component Value Date    WBC 5.65 06/08/2023    HGB 11.8 (L) 06/08/2023    HCT 35.0 (L) 06/08/2023    MCV 90 06/08/2023     06/08/2023       BMP  Lab Results   Component Value Date     06/08/2023    K 4.1 06/08/2023     06/08/2023    CO2 25 06/08/2023    BUN 12 06/08/2023    CREATININE 1.2 06/08/2023    CALCIUM 10.3 06/08/2023    ANIONGAP 8 06/08/2023    EGFRNORACEVR >60 06/08/2023     Lab Results   Component Value Date    ALT 13 06/08/2023    AST 19 06/08/2023    ALKPHOS 52 (L) 06/08/2023    BILITOT 0.7 06/08/2023     Lab Results   Component Value Date    UIBC 191 04/09/2012    IRON 79 10/02/2020    TRANSFERRIN 239 10/02/2020    TIBC 354 10/02/2020    FESATURATED 22 10/02/2020           Physical Exam  Vitals reviewed.   Constitutional:       Appearance: He is well-developed.   HENT:      Head: Normocephalic.      Right Ear: External ear normal.      Left Ear: External ear normal.   Eyes:      General: Lids are normal. No scleral icterus.        Right eye: No discharge.         Left eye: No discharge.      Conjunctiva/sclera: Conjunctivae normal.   Neck:      Thyroid: No thyroid mass.   Cardiovascular:      Rate and Rhythm: Normal rate and regular rhythm.      Heart sounds: Normal heart sounds.   Pulmonary:      Effort: Pulmonary effort is normal. No respiratory distress.      Breath sounds: Normal breath sounds. No wheezing or rales.   Abdominal:      General: Bowel sounds are normal. There is no distension.      Palpations: Abdomen is soft.      Tenderness: There is no abdominal tenderness.   Genitourinary:     Comments: deferred  Musculoskeletal:         General: Normal range of motion.      Cervical back: Normal range of motion.   Lymphadenopathy:      Head:      Right side of head: No submandibular, preauricular or posterior auricular adenopathy.      Left side of head: No submandibular, preauricular or posterior auricular adenopathy.   Skin:     General: Skin is warm and dry.   Neurological:      Mental Status: He is alert and oriented to person, place, and time.   Psychiatric:         Speech: Speech normal.         Behavior: Behavior normal. Behavior is cooperative.         Thought Content: Thought content normal.        Assessment:     Problem List Items Addressed This Visit          Oncology    MGUS (monoclonal gammopathy of unknown significance) - Primary     Laboratory review stable. No CRAB criteria to suggest progression. No B symptoms. Mild anemia unchanged from baseline 11-12 range. No other cytopenias    F/u 6 months with CBC, CMP, POLLY, FLC, Quantitative Immunoglobulins, Beta 2 labs, SPEP 1 week prior. Discussed S&S to report sooner    Patient reports fall few weeks ago. C/o  left thumb pain. Will do xray         Relevant Orders    X-Ray Hand 3 View Left    CBC Auto Differential    Comprehensive Metabolic Panel    Protein Electrophoresis, Serum    Immunofixation Electrophoresis    Immunoglobulin free LT chains blood    IMMUNOGLOBULINS (IGG, IGA, IGM) QUANTITATIVE    Beta 2 Microglobulin, Serum    History of prostate cancer     Continue Urology follow up          Other Visit Diagnoses       Smoldering myeloma        Relevant Orders    X-Ray Hand 3 View Left    CBC Auto Differential    Comprehensive Metabolic Panel    Protein Electrophoresis, Serum    Immunofixation Electrophoresis    Immunoglobulin free LT chains blood    IMMUNOGLOBULINS (IGG, IGA, IGM) QUANTITATIVE    Beta 2 Microglobulin, Serum          Route Chart for Scheduling    Med Onc Chart Routing      Follow up with physician    Follow up with FIORELLA 6 months.   Infusion scheduling note    Injection scheduling note    Labs CBC, CMP, SPEP, free light chains and other   Scheduling:  Preferred lab:  Lab interval:     Imaging None      Pharmacy appointment No pharmacy appointment needed      Other referrals     No additional referrals needed              Plan:     MGUS (monoclonal gammopathy of unknown significance)  -     X-Ray Hand 3 View Left; Future; Expected date: 06/27/2023  -     CBC Auto Differential; Future; Expected date: 06/27/2023  -     Comprehensive Metabolic Panel; Future; Expected date: 06/27/2023  -     Protein Electrophoresis, Serum; Future; Expected date: 06/27/2023  -     Immunofixation Electrophoresis; Future; Expected date: 06/27/2023  -     Immunoglobulin free LT chains blood; Future; Expected date: 06/27/2023  -     IMMUNOGLOBULINS (IGG, IGA, IGM) QUANTITATIVE; Future; Expected date: 06/27/2023  -     Beta 2 Microglobulin, Serum; Future; Expected date: 06/27/2023    Smoldering myeloma  -     X-Ray Hand 3 View Left; Future; Expected date: 06/27/2023  -     CBC Auto Differential; Future; Expected date:  06/27/2023  -     Comprehensive Metabolic Panel; Future; Expected date: 06/27/2023  -     Protein Electrophoresis, Serum; Future; Expected date: 06/27/2023  -     Immunofixation Electrophoresis; Future; Expected date: 06/27/2023  -     Immunoglobulin free LT chains blood; Future; Expected date: 06/27/2023  -     IMMUNOGLOBULINS (IGG, IGA, IGM) QUANTITATIVE; Future; Expected date: 06/27/2023  -     Beta 2 Microglobulin, Serum; Future; Expected date: 06/27/2023    History of prostate cancer          CONY Finnegan

## 2023-06-27 NOTE — ASSESSMENT & PLAN NOTE
Laboratory review stable. No CRAB criteria to suggest progression. No B symptoms. Mild anemia unchanged from baseline 11-12 range. No other cytopenias    F/u 6 months with CBC, CMP, POLLY, FLC, Quantitative Immunoglobulins, Beta 2 labs, SPEP 1 week prior. Discussed S&S to report sooner    Patient reports fall few weeks ago. C/o left thumb pain. Will do xray

## 2023-08-28 ENCOUNTER — TELEPHONE (OUTPATIENT)
Dept: INTERNAL MEDICINE | Facility: CLINIC | Age: 83
End: 2023-08-28
Payer: MEDICARE

## 2023-08-28 NOTE — TELEPHONE ENCOUNTER
Spoke with Bela, advised her we may not have them in that day but they can go to [facility] and get it there. She verbalized understanding.

## 2023-09-13 ENCOUNTER — OFFICE VISIT (OUTPATIENT)
Dept: INTERNAL MEDICINE | Facility: CLINIC | Age: 83
End: 2023-09-13
Payer: MEDICARE

## 2023-09-13 ENCOUNTER — LAB VISIT (OUTPATIENT)
Dept: LAB | Facility: HOSPITAL | Age: 83
End: 2023-09-13
Attending: FAMILY MEDICINE
Payer: MEDICARE

## 2023-09-13 VITALS
OXYGEN SATURATION: 99 % | BODY MASS INDEX: 31.22 KG/M2 | HEIGHT: 66 IN | TEMPERATURE: 98 F | SYSTOLIC BLOOD PRESSURE: 138 MMHG | RESPIRATION RATE: 18 BRPM | WEIGHT: 194.25 LBS | HEART RATE: 69 BPM | DIASTOLIC BLOOD PRESSURE: 88 MMHG

## 2023-09-13 DIAGNOSIS — N18.31 CHRONIC KIDNEY DISEASE, STAGE 3A: ICD-10-CM

## 2023-09-13 DIAGNOSIS — Z85.46 HISTORY OF PROSTATE CANCER: ICD-10-CM

## 2023-09-13 DIAGNOSIS — I10 ESSENTIAL HYPERTENSION: Primary | ICD-10-CM

## 2023-09-13 LAB — COMPLEXED PSA SERPL-MCNC: <0.01 NG/ML (ref 0–4)

## 2023-09-13 PROCEDURE — 36415 COLL VENOUS BLD VENIPUNCTURE: CPT | Mod: HCNC | Performed by: FAMILY MEDICINE

## 2023-09-13 PROCEDURE — 99214 OFFICE O/P EST MOD 30 MIN: CPT | Mod: HCNC,S$GLB,, | Performed by: FAMILY MEDICINE

## 2023-09-13 PROCEDURE — 3079F PR MOST RECENT DIASTOLIC BLOOD PRESSURE 80-89 MM HG: ICD-10-PCS | Mod: HCNC,CPTII,S$GLB, | Performed by: FAMILY MEDICINE

## 2023-09-13 PROCEDURE — 3079F DIAST BP 80-89 MM HG: CPT | Mod: HCNC,CPTII,S$GLB, | Performed by: FAMILY MEDICINE

## 2023-09-13 PROCEDURE — 1157F ADVNC CARE PLAN IN RCRD: CPT | Mod: HCNC,CPTII,S$GLB, | Performed by: FAMILY MEDICINE

## 2023-09-13 PROCEDURE — 1159F MED LIST DOCD IN RCRD: CPT | Mod: HCNC,CPTII,S$GLB, | Performed by: FAMILY MEDICINE

## 2023-09-13 PROCEDURE — 1159F PR MEDICATION LIST DOCUMENTED IN MEDICAL RECORD: ICD-10-PCS | Mod: HCNC,CPTII,S$GLB, | Performed by: FAMILY MEDICINE

## 2023-09-13 PROCEDURE — 3075F SYST BP GE 130 - 139MM HG: CPT | Mod: HCNC,CPTII,S$GLB, | Performed by: FAMILY MEDICINE

## 2023-09-13 PROCEDURE — 1101F PT FALLS ASSESS-DOCD LE1/YR: CPT | Mod: HCNC,CPTII,S$GLB, | Performed by: FAMILY MEDICINE

## 2023-09-13 PROCEDURE — 3075F PR MOST RECENT SYSTOLIC BLOOD PRESS GE 130-139MM HG: ICD-10-PCS | Mod: HCNC,CPTII,S$GLB, | Performed by: FAMILY MEDICINE

## 2023-09-13 PROCEDURE — 1101F PR PT FALLS ASSESS DOC 0-1 FALLS W/OUT INJ PAST YR: ICD-10-PCS | Mod: HCNC,CPTII,S$GLB, | Performed by: FAMILY MEDICINE

## 2023-09-13 PROCEDURE — 84153 ASSAY OF PSA TOTAL: CPT | Mod: HCNC | Performed by: FAMILY MEDICINE

## 2023-09-13 PROCEDURE — 3288F FALL RISK ASSESSMENT DOCD: CPT | Mod: HCNC,CPTII,S$GLB, | Performed by: FAMILY MEDICINE

## 2023-09-13 PROCEDURE — 99214 PR OFFICE/OUTPT VISIT, EST, LEVL IV, 30-39 MIN: ICD-10-PCS | Mod: HCNC,S$GLB,, | Performed by: FAMILY MEDICINE

## 2023-09-13 PROCEDURE — 1125F PR PAIN SEVERITY QUANTIFIED, PAIN PRESENT: ICD-10-PCS | Mod: HCNC,CPTII,S$GLB, | Performed by: FAMILY MEDICINE

## 2023-09-13 PROCEDURE — 99999 PR PBB SHADOW E&M-EST. PATIENT-LVL IV: CPT | Mod: PBBFAC,HCNC,, | Performed by: FAMILY MEDICINE

## 2023-09-13 PROCEDURE — 3288F PR FALLS RISK ASSESSMENT DOCUMENTED: ICD-10-PCS | Mod: HCNC,CPTII,S$GLB, | Performed by: FAMILY MEDICINE

## 2023-09-13 PROCEDURE — 1157F PR ADVANCE CARE PLAN OR EQUIV PRESENT IN MEDICAL RECORD: ICD-10-PCS | Mod: HCNC,CPTII,S$GLB, | Performed by: FAMILY MEDICINE

## 2023-09-13 PROCEDURE — 1125F AMNT PAIN NOTED PAIN PRSNT: CPT | Mod: HCNC,CPTII,S$GLB, | Performed by: FAMILY MEDICINE

## 2023-09-13 PROCEDURE — 99999 PR PBB SHADOW E&M-EST. PATIENT-LVL IV: ICD-10-PCS | Mod: PBBFAC,HCNC,, | Performed by: FAMILY MEDICINE

## 2023-09-13 NOTE — ASSESSMENT & PLAN NOTE
Lab Results   Component Value Date    BUN 12 06/08/2023    CREATININE 1.2 06/08/2023    ESTGFRAFRICA >60.0 03/24/2022    EGFRNONAA 56.4 (A) 03/24/2022    EGFRNORACEVR >60 06/08/2023

## 2023-09-13 NOTE — PATIENT INSTRUCTIONS
Get your flu vaccine this season, Flu vaccines start becoming available in September and we continue vaccinating through the winter months. The [facility] is having a drive-thru flu vaccine clinic on Saturday, September 23rd. Flu vaccines are also available at most local pharmacies.     You are eligible for a covid booster, covid vaccines are available at most pharmacies.     Check with your pharmacist regarding Tdap (tetanus/diphtheria/pertussis) vaccine.

## 2023-09-13 NOTE — PROGRESS NOTES
Subjective:       Patient ID: Curtis Landry is a 82 y.o. male.    Chief Complaint: Follow-up    Patient presents to clinic today for followup of chronic conditions.      Review of Systems   Constitutional:  Negative for chills, fatigue, fever and unexpected weight change.   Eyes:  Negative for visual disturbance.   Respiratory:  Negative for shortness of breath.    Cardiovascular:  Negative for chest pain.   Musculoskeletal:  Negative for myalgias.   Neurological:  Negative for headaches.         Objective:      Physical Exam  Vitals reviewed.   Constitutional:       General: He is not in acute distress.     Appearance: He is well-developed.   HENT:      Head: Normocephalic and atraumatic.   Eyes:      General: Lids are normal. No scleral icterus.     Extraocular Movements: Extraocular movements intact.      Conjunctiva/sclera: Conjunctivae normal.      Pupils: Pupils are equal, round, and reactive to light.   Pulmonary:      Effort: Pulmonary effort is normal.   Neurological:      Mental Status: He is alert and oriented to person, place, and time.      Cranial Nerves: No cranial nerve deficit.      Gait: Gait normal.   Psychiatric:         Mood and Affect: Mood and affect normal.         Assessment:       1. Essential hypertension    2. Chronic kidney disease, stage 3a    3. History of prostate cancer        Plan:   1. Essential hypertension  Assessment & Plan:  Controlled, continue current medications     Orders:  -     Comprehensive Metabolic Panel; Future; Expected date: 03/13/2024  -     Lipid Panel; Future; Expected date: 03/13/2024  -     TSH; Future; Expected date: 03/13/2024  -     CBC Auto Differential; Future; Expected date: 03/13/2024    2. Chronic kidney disease, stage 3a  Assessment & Plan:  Lab Results   Component Value Date    BUN 12 06/08/2023    CREATININE 1.2 06/08/2023    ESTGFRAFRICA >60.0 03/24/2022    EGFRNONAA 56.4 (A) 03/24/2022    EGFRNORACEVR >60 06/08/2023           3. History of  prostate cancer  Overview:  Followed by Urology, continue current treatment plan     Orders:  -     PROSTATE SPECIFIC ANTIGEN, DIAGNOSTIC; Future; Expected date: 09/13/2023  -     Ambulatory referral/consult to Urology; Future; Expected date: 09/20/2023        Health Maintenance reviewed/updated.

## 2023-10-03 ENCOUNTER — OFFICE VISIT (OUTPATIENT)
Dept: UROLOGY | Facility: CLINIC | Age: 83
End: 2023-10-03
Payer: MEDICARE

## 2023-10-03 VITALS
RESPIRATION RATE: 16 BRPM | BODY MASS INDEX: 31.37 KG/M2 | TEMPERATURE: 98 F | HEART RATE: 70 BPM | HEIGHT: 66 IN | SYSTOLIC BLOOD PRESSURE: 138 MMHG | WEIGHT: 195.19 LBS | DIASTOLIC BLOOD PRESSURE: 88 MMHG

## 2023-10-03 DIAGNOSIS — Z85.46 HISTORY OF PROSTATE CANCER: Primary | ICD-10-CM

## 2023-10-03 DIAGNOSIS — N39.3 STRESS INCONTINENCE: ICD-10-CM

## 2023-10-03 PROCEDURE — 1101F PR PT FALLS ASSESS DOC 0-1 FALLS W/OUT INJ PAST YR: ICD-10-PCS | Mod: HCNC,CPTII,S$GLB, | Performed by: UROLOGY

## 2023-10-03 PROCEDURE — 1126F PR PAIN SEVERITY QUANTIFIED, NO PAIN PRESENT: ICD-10-PCS | Mod: HCNC,CPTII,S$GLB, | Performed by: UROLOGY

## 2023-10-03 PROCEDURE — 99213 OFFICE O/P EST LOW 20 MIN: CPT | Mod: HCNC,S$GLB,, | Performed by: UROLOGY

## 2023-10-03 PROCEDURE — 1101F PT FALLS ASSESS-DOCD LE1/YR: CPT | Mod: HCNC,CPTII,S$GLB, | Performed by: UROLOGY

## 2023-10-03 PROCEDURE — 3079F DIAST BP 80-89 MM HG: CPT | Mod: HCNC,CPTII,S$GLB, | Performed by: UROLOGY

## 2023-10-03 PROCEDURE — 1159F MED LIST DOCD IN RCRD: CPT | Mod: HCNC,CPTII,S$GLB, | Performed by: UROLOGY

## 2023-10-03 PROCEDURE — 3288F FALL RISK ASSESSMENT DOCD: CPT | Mod: HCNC,CPTII,S$GLB, | Performed by: UROLOGY

## 2023-10-03 PROCEDURE — 99213 PR OFFICE/OUTPT VISIT, EST, LEVL III, 20-29 MIN: ICD-10-PCS | Mod: HCNC,S$GLB,, | Performed by: UROLOGY

## 2023-10-03 PROCEDURE — 99999 PR PBB SHADOW E&M-EST. PATIENT-LVL IV: ICD-10-PCS | Mod: PBBFAC,HCNC,, | Performed by: UROLOGY

## 2023-10-03 PROCEDURE — 1126F AMNT PAIN NOTED NONE PRSNT: CPT | Mod: HCNC,CPTII,S$GLB, | Performed by: UROLOGY

## 2023-10-03 PROCEDURE — 3288F PR FALLS RISK ASSESSMENT DOCUMENTED: ICD-10-PCS | Mod: HCNC,CPTII,S$GLB, | Performed by: UROLOGY

## 2023-10-03 PROCEDURE — 3079F PR MOST RECENT DIASTOLIC BLOOD PRESSURE 80-89 MM HG: ICD-10-PCS | Mod: HCNC,CPTII,S$GLB, | Performed by: UROLOGY

## 2023-10-03 PROCEDURE — 1159F PR MEDICATION LIST DOCUMENTED IN MEDICAL RECORD: ICD-10-PCS | Mod: HCNC,CPTII,S$GLB, | Performed by: UROLOGY

## 2023-10-03 PROCEDURE — 3075F SYST BP GE 130 - 139MM HG: CPT | Mod: HCNC,CPTII,S$GLB, | Performed by: UROLOGY

## 2023-10-03 PROCEDURE — 1160F RVW MEDS BY RX/DR IN RCRD: CPT | Mod: HCNC,CPTII,S$GLB, | Performed by: UROLOGY

## 2023-10-03 PROCEDURE — 3075F PR MOST RECENT SYSTOLIC BLOOD PRESS GE 130-139MM HG: ICD-10-PCS | Mod: HCNC,CPTII,S$GLB, | Performed by: UROLOGY

## 2023-10-03 PROCEDURE — 1157F PR ADVANCE CARE PLAN OR EQUIV PRESENT IN MEDICAL RECORD: ICD-10-PCS | Mod: HCNC,CPTII,S$GLB, | Performed by: UROLOGY

## 2023-10-03 PROCEDURE — 99999 PR PBB SHADOW E&M-EST. PATIENT-LVL IV: CPT | Mod: PBBFAC,HCNC,, | Performed by: UROLOGY

## 2023-10-03 PROCEDURE — 1160F PR REVIEW ALL MEDS BY PRESCRIBER/CLIN PHARMACIST DOCUMENTED: ICD-10-PCS | Mod: HCNC,CPTII,S$GLB, | Performed by: UROLOGY

## 2023-10-03 PROCEDURE — 1157F ADVNC CARE PLAN IN RCRD: CPT | Mod: HCNC,CPTII,S$GLB, | Performed by: UROLOGY

## 2023-10-03 NOTE — PROGRESS NOTES
Chief Complaint:   Encounter Diagnoses   Name Primary?    History of prostate cancer Yes    Stress incontinence        HPI:  HPI Curtis Landry sweta 82 y.o. male who presents with follow up to prostate cancer.  Patient underwent a prostatectomy in .  His PSA has been undetectable.  He has mild stress incontinence.  He states he goes through 3 pull-ups per week.  He is here with his cousin.  He is legally blind.  He is no longer sexually active.  He recently had a PSA that was undetectable once again.    History:  Social History     Tobacco Use    Smoking status: Former     Current packs/day: 0.00     Average packs/day: 0.1 packs/day for 10.0 years (1.0 ttl pk-yrs)     Types: Cigarettes     Start date: 1970     Quit date: 1980     Years since quittin.7    Smokeless tobacco: Never   Substance Use Topics    Alcohol use: No    Drug use: No     Past Medical History:   Diagnosis Date    Anemia     Angina pectoris     Arthritis     CHF (congestive heart failure)     Glaucoma (increased eye pressure)     Followed by outside opthalmology    HTN (hypertension)     Hyperlipidemia     Macular degeneration     Pneumonia     did not require hospitalization    Prostate cancer     Prostate cancer     Urinary incontinence      Past Surgical History:   Procedure Laterality Date    APPENDECTOMY      CARDIAC DEFIBRILLATOR PLACEMENT      CARDIAC PACEMAKER PLACEMENT      CARDIAC PACEMAKER PLACEMENT      GALLBLADDER SURGERY      PROSTATECTOMY      TOTAL HIP ARTHROPLASTY       Family History   Problem Relation Age of Onset    Cancer Mother         pancreas    Cancer Father     No Known Problems Daughter     Diabetes Neg Hx     Heart disease Neg Hx     Hyperlipidemia Neg Hx     Hypertension Neg Hx     Kidney disease Neg Hx     Stroke Neg Hx        Current Outpatient Medications on File Prior to Visit   Medication Sig Dispense Refill    acetaminophen (TYLENOL) 650 MG TbSR Take 1 tablet (650 mg total) by mouth every 8 (eight)  "hours.  0    aspirin (ECOTRIN) 81 MG EC tablet Take by mouth. 1 Tablet, Delayed Release (E.C.) Oral Every day      carvediloL (COREG) 12.5 MG tablet Take 1 tablet (12.5 mg total) by mouth 2 (two) times daily. 180 tablet 3    dorzolamide-timolol 2-0.5% (COSOPT) 22.3-6.8 mg/mL ophthalmic solution Place 1 drop into both eyes 2 (two) times daily.  4    ferrous gluconate (FERGON) 240 (27 FE) MG tablet Take 240 mg by mouth every other day.      losartan (COZAAR) 50 MG tablet Take 1 tablet (50 mg total) by mouth once daily. 90 tablet 3    travoprost (TRAVATAN Z) 0.004 % ophthalmic solution Inject into the eye. 1 Drops Ophthalmic At bedtime       No current facility-administered medications on file prior to visit.        Objective:     Vitals:    10/03/23 1109   BP: 138/88   BP Location: Left arm   Patient Position: Sitting   Pulse: 70   Resp: 16   Temp: 97.8 °F (36.6 °C)   TempSrc: Oral   Weight: 88.6 kg (195 lb 3.5 oz)   Height: 5' 6" (1.676 m)      BMI Readings from Last 1 Encounters:   10/03/23 31.51 kg/m²          Physical Exam  Walks with the aid of walking stick   Respirations even unlabored   Abdomen is soft nontender  Lab Results   Component Value Date    PSADIAG <0.01 09/13/2023    PSADIAG <0.01 03/24/2022    PSADIAG <0.01 05/11/2021    PSADIAG <0.01 09/21/2020    PSADIAG <0.01 05/13/2019    PSADIAG <0.01 05/07/2018    PSADIAG <0.01 08/02/2016    PSADIAG <0.01 08/10/2015    PSADIAG <0.01 08/27/2014    PSA <0.010 07/31/2013    PSA <0.010 04/09/2012    PSA <0.1 03/16/2006    PSA <0.1 09/16/2005    PSA <0.1 03/15/2005    PSA <0.1 09/17/2004    PSA <0.1 03/01/2004        Lab Results   Component Value Date    CREATININE 1.2 06/08/2023      Assessment:       1. History of prostate cancer    2. Stress incontinence        Plan:     1. History of prostate cancer    2. Stress incontinence           History of prostate cancer.  PSA undetectable.  Baseline stress incontinence stable and minimal.  Recommend annual follow-up " with PSA.

## 2023-10-09 ENCOUNTER — TELEPHONE (OUTPATIENT)
Dept: CARDIOLOGY | Facility: CLINIC | Age: 83
End: 2023-10-09
Payer: MEDICARE

## 2023-10-09 NOTE — TELEPHONE ENCOUNTER
Contacted pt and got him r/s to 10/31/23. Pt vu w/o q/c        ----- Message from Fernando Wheeler sent at 10/9/2023  3:25 PM CDT -----  Contact: Bela  Type:  Sooner Apoointment Request    Caller is requesting a sooner appointment.  Caller declined first available appointment listed below.  Caller will not accept being placed on the waitlist and is requesting a message be sent to doctor.  Name of Caller:Bela  When is the first available appointment?10/10/23  Symptoms:Device check  Would the patient rather a call back or a response via [facility]? Call  Best Call Back Number: [phone]   Additional Information: Request to cancel and reschedule 10/10/23 appointment. Please give Ms. Cramer a call to reschedule.  Thank you,  TH

## 2023-10-12 ENCOUNTER — OFFICE VISIT (OUTPATIENT)
Dept: INTERNAL MEDICINE | Facility: CLINIC | Age: 83
End: 2023-10-12
Payer: MEDICARE

## 2023-10-12 VITALS
HEART RATE: 74 BPM | SYSTOLIC BLOOD PRESSURE: 138 MMHG | OXYGEN SATURATION: 98 % | BODY MASS INDEX: 30.76 KG/M2 | TEMPERATURE: 97 F | WEIGHT: 190.56 LBS | DIASTOLIC BLOOD PRESSURE: 62 MMHG

## 2023-10-12 DIAGNOSIS — J06.9 VIRAL URI WITH COUGH: Primary | ICD-10-CM

## 2023-10-12 PROCEDURE — 1160F PR REVIEW ALL MEDS BY PRESCRIBER/CLIN PHARMACIST DOCUMENTED: ICD-10-PCS | Mod: HCNC,CPTII,S$GLB, | Performed by: FAMILY MEDICINE

## 2023-10-12 PROCEDURE — 1101F PT FALLS ASSESS-DOCD LE1/YR: CPT | Mod: HCNC,CPTII,S$GLB, | Performed by: FAMILY MEDICINE

## 2023-10-12 PROCEDURE — 99213 PR OFFICE/OUTPT VISIT, EST, LEVL III, 20-29 MIN: ICD-10-PCS | Mod: HCNC,S$GLB,, | Performed by: FAMILY MEDICINE

## 2023-10-12 PROCEDURE — 1157F PR ADVANCE CARE PLAN OR EQUIV PRESENT IN MEDICAL RECORD: ICD-10-PCS | Mod: HCNC,CPTII,S$GLB, | Performed by: FAMILY MEDICINE

## 2023-10-12 PROCEDURE — 1125F PR PAIN SEVERITY QUANTIFIED, PAIN PRESENT: ICD-10-PCS | Mod: HCNC,CPTII,S$GLB, | Performed by: FAMILY MEDICINE

## 2023-10-12 PROCEDURE — 99213 OFFICE O/P EST LOW 20 MIN: CPT | Mod: HCNC,S$GLB,, | Performed by: FAMILY MEDICINE

## 2023-10-12 PROCEDURE — 1125F AMNT PAIN NOTED PAIN PRSNT: CPT | Mod: HCNC,CPTII,S$GLB, | Performed by: FAMILY MEDICINE

## 2023-10-12 PROCEDURE — 1157F ADVNC CARE PLAN IN RCRD: CPT | Mod: HCNC,CPTII,S$GLB, | Performed by: FAMILY MEDICINE

## 2023-10-12 PROCEDURE — 3288F FALL RISK ASSESSMENT DOCD: CPT | Mod: HCNC,CPTII,S$GLB, | Performed by: FAMILY MEDICINE

## 2023-10-12 PROCEDURE — 99999 PR PBB SHADOW E&M-EST. PATIENT-LVL III: ICD-10-PCS | Mod: PBBFAC,HCNC,, | Performed by: FAMILY MEDICINE

## 2023-10-12 PROCEDURE — 99999 PR PBB SHADOW E&M-EST. PATIENT-LVL III: CPT | Mod: PBBFAC,HCNC,, | Performed by: FAMILY MEDICINE

## 2023-10-12 PROCEDURE — 1159F MED LIST DOCD IN RCRD: CPT | Mod: HCNC,CPTII,S$GLB, | Performed by: FAMILY MEDICINE

## 2023-10-12 PROCEDURE — 1101F PR PT FALLS ASSESS DOC 0-1 FALLS W/OUT INJ PAST YR: ICD-10-PCS | Mod: HCNC,CPTII,S$GLB, | Performed by: FAMILY MEDICINE

## 2023-10-12 PROCEDURE — 3075F PR MOST RECENT SYSTOLIC BLOOD PRESS GE 130-139MM HG: ICD-10-PCS | Mod: HCNC,CPTII,S$GLB, | Performed by: FAMILY MEDICINE

## 2023-10-12 PROCEDURE — 1160F RVW MEDS BY RX/DR IN RCRD: CPT | Mod: HCNC,CPTII,S$GLB, | Performed by: FAMILY MEDICINE

## 2023-10-12 PROCEDURE — 3078F DIAST BP <80 MM HG: CPT | Mod: HCNC,CPTII,S$GLB, | Performed by: FAMILY MEDICINE

## 2023-10-12 PROCEDURE — 3075F SYST BP GE 130 - 139MM HG: CPT | Mod: HCNC,CPTII,S$GLB, | Performed by: FAMILY MEDICINE

## 2023-10-12 PROCEDURE — 1159F PR MEDICATION LIST DOCUMENTED IN MEDICAL RECORD: ICD-10-PCS | Mod: HCNC,CPTII,S$GLB, | Performed by: FAMILY MEDICINE

## 2023-10-12 PROCEDURE — 3078F PR MOST RECENT DIASTOLIC BLOOD PRESSURE < 80 MM HG: ICD-10-PCS | Mod: HCNC,CPTII,S$GLB, | Performed by: FAMILY MEDICINE

## 2023-10-12 PROCEDURE — 3288F PR FALLS RISK ASSESSMENT DOCUMENTED: ICD-10-PCS | Mod: HCNC,CPTII,S$GLB, | Performed by: FAMILY MEDICINE

## 2023-10-12 NOTE — PROGRESS NOTES
Subjective:       Patient ID: Curtis Landry is a 82 y.o. male.    Chief Complaint: Cough (Patient stated that he has had a cold/cough for about a week now. He feels like he has some chest congestion. ) and Headache (Patient stated that he has had a slight headache. )    Patient presents to clinic today with caregiver reporting cold symptoms for 1 week.  He reports he is starting to feel better.  Caregiver states he wanted to just come in and get checked out.  They are otherwise without concerns today.      Review of Systems   Constitutional:  Negative for chills, fatigue, fever and unexpected weight change.   HENT:  Positive for congestion.    Eyes:  Negative for visual disturbance.   Respiratory:  Positive for cough (Mild, OTC meds helping). Negative for shortness of breath.    Cardiovascular:  Negative for chest pain.   Musculoskeletal:  Negative for myalgias.   Neurological:  Negative for headaches.         Objective:      Physical Exam  Vitals reviewed.   Constitutional:       General: He is not in acute distress.     Appearance: He is well-developed.   HENT:      Head: Normocephalic and atraumatic.   Eyes:      General: Lids are normal. No scleral icterus.     Extraocular Movements: Extraocular movements intact.      Conjunctiva/sclera: Conjunctivae normal.      Pupils: Pupils are equal, round, and reactive to light.   Cardiovascular:      Rate and Rhythm: Normal rate and regular rhythm.      Heart sounds: No murmur heard.     No friction rub. No gallop.   Pulmonary:      Effort: Pulmonary effort is normal.      Breath sounds: Normal breath sounds. No decreased breath sounds, wheezing, rhonchi or rales.   Neurological:      Mental Status: He is alert and oriented to person, place, and time.      Cranial Nerves: No cranial nerve deficit.      Gait: Gait normal.   Psychiatric:         Mood and Affect: Mood and affect normal.         Assessment:       1. Viral URI with cough        Plan:   1. Viral URI with  cough      Reassured patient, symptoms resolving, advised rest and fluids, follow-up if worse or persistent.  Advised he can get his flu vaccine when feeling well.  Patient/caregiver expressed understanding and agreement with plan.

## 2023-10-13 ENCOUNTER — TELEPHONE (OUTPATIENT)
Dept: INTERNAL MEDICINE | Facility: CLINIC | Age: 83
End: 2023-10-13
Payer: MEDICARE

## 2023-10-13 NOTE — TELEPHONE ENCOUNTER
----- Message from Diamone Speed sent at 10/13/2023  1:57 PM CDT -----  Regarding: daugther claudea  Type: Patient Call Back       Who called: daughter claudea        What is the request in detail:  she states that she thinks her dad  needs to be treated for anxiety       Can the clinic reply by [facility]? No       Would the patient rather a call back or a response via [facility]? Call back       Best call back number: [phone]

## 2023-10-13 NOTE — TELEPHONE ENCOUNTER
----- Message from Ema Resendez sent at 10/13/2023  3:51 PM CDT -----  Regarding: pt daughter called  Name of Who is Calling: RADHA RAMIREZ [[id number]]      What is the request in detail: Requesting to change appt to virtual visit on 10/17. Please advise       Can the clinic reply by [facility]: NO       What Number to Call Back if not in [facility]: Telephone Information: Telephone Information:  Mobile          [phone]

## 2023-10-17 ENCOUNTER — OFFICE VISIT (OUTPATIENT)
Dept: INTERNAL MEDICINE | Facility: CLINIC | Age: 83
End: 2023-10-17
Payer: MEDICARE

## 2023-10-17 DIAGNOSIS — F43.23 SITUATIONAL MIXED ANXIETY AND DEPRESSIVE DISORDER: Primary | ICD-10-CM

## 2023-10-17 PROCEDURE — 1159F MED LIST DOCD IN RCRD: CPT | Mod: HCNC,CPTII,95, | Performed by: FAMILY MEDICINE

## 2023-10-17 PROCEDURE — 99214 OFFICE O/P EST MOD 30 MIN: CPT | Mod: HCNC,95,, | Performed by: FAMILY MEDICINE

## 2023-10-17 PROCEDURE — 1160F PR REVIEW ALL MEDS BY PRESCRIBER/CLIN PHARMACIST DOCUMENTED: ICD-10-PCS | Mod: HCNC,CPTII,95, | Performed by: FAMILY MEDICINE

## 2023-10-17 PROCEDURE — 1126F PR PAIN SEVERITY QUANTIFIED, NO PAIN PRESENT: ICD-10-PCS | Mod: HCNC,CPTII,95, | Performed by: FAMILY MEDICINE

## 2023-10-17 PROCEDURE — 1101F PT FALLS ASSESS-DOCD LE1/YR: CPT | Mod: HCNC,CPTII,95, | Performed by: FAMILY MEDICINE

## 2023-10-17 PROCEDURE — 1101F PR PT FALLS ASSESS DOC 0-1 FALLS W/OUT INJ PAST YR: ICD-10-PCS | Mod: HCNC,CPTII,95, | Performed by: FAMILY MEDICINE

## 2023-10-17 PROCEDURE — 3288F PR FALLS RISK ASSESSMENT DOCUMENTED: ICD-10-PCS | Mod: HCNC,CPTII,95, | Performed by: FAMILY MEDICINE

## 2023-10-17 PROCEDURE — 1159F PR MEDICATION LIST DOCUMENTED IN MEDICAL RECORD: ICD-10-PCS | Mod: HCNC,CPTII,95, | Performed by: FAMILY MEDICINE

## 2023-10-17 PROCEDURE — 1157F ADVNC CARE PLAN IN RCRD: CPT | Mod: HCNC,CPTII,95, | Performed by: FAMILY MEDICINE

## 2023-10-17 PROCEDURE — 99214 PR OFFICE/OUTPT VISIT, EST, LEVL IV, 30-39 MIN: ICD-10-PCS | Mod: HCNC,95,, | Performed by: FAMILY MEDICINE

## 2023-10-17 PROCEDURE — 1157F PR ADVANCE CARE PLAN OR EQUIV PRESENT IN MEDICAL RECORD: ICD-10-PCS | Mod: HCNC,CPTII,95, | Performed by: FAMILY MEDICINE

## 2023-10-17 PROCEDURE — 1160F RVW MEDS BY RX/DR IN RCRD: CPT | Mod: HCNC,CPTII,95, | Performed by: FAMILY MEDICINE

## 2023-10-17 PROCEDURE — 1126F AMNT PAIN NOTED NONE PRSNT: CPT | Mod: HCNC,CPTII,95, | Performed by: FAMILY MEDICINE

## 2023-10-17 PROCEDURE — 3288F FALL RISK ASSESSMENT DOCD: CPT | Mod: HCNC,CPTII,95, | Performed by: FAMILY MEDICINE

## 2023-10-17 RX ORDER — SERTRALINE HYDROCHLORIDE 25 MG/1
25 TABLET, FILM COATED ORAL DAILY
Qty: 30 TABLET | Refills: 1 | Status: SHIPPED | OUTPATIENT
Start: 2023-10-17 | End: 2023-11-08

## 2023-10-17 NOTE — ASSESSMENT & PLAN NOTE
After discussion we have agreed to start patient on Zoloft 25 mg daily; discussed risks/benefits of this medication; advised to notify me for any problems with medication; will plan to follow-up virtually in 1 month for reassessment; patient and family expressed understanding and agreement with plan

## 2023-10-17 NOTE — PROGRESS NOTES
Subjective:       Patient ID: Curtis Landry is a 82 y.o. male.    Chief Complaint: Anxiety    The patient location is: home  The chief complaint leading to consultation is: anxiety    Visit type: audiovisual    Face to Face time with patient: 9 minutes (chart review started 10:07 am; video started 10:09 am; video ended 10:18 am)  14 minutes of total time spent on the encounter, which includes face to face time and non-face to face time preparing to see the patient (eg, review of tests), Obtaining and/or reviewing separately obtained history, Documenting clinical information in the electronic or other health record, Independently interpreting results (not separately reported) and communicating results to the patient/family/caregiver, or Care coordination (not separately reported).     Each patient to whom he or she provides medical services by telemedicine is:  (1) informed of the relationship between the physician and patient and the respective role of any other health care provider with respect to management of the patient; and (2) notified that he or she may decline to receive medical services by telemedicine and may withdraw from such care at any time.    Virtual visit to discuss anxiety. Family reports he gets agitated and anxious, especially when has trouble getting around due to vision impairment. They reports it happens several times a month. They also report depressive symptoms. Patient and family are otherwise without concerns today.      Review of Systems   Constitutional:  Negative for activity change and unexpected weight change.   HENT:  Negative for hearing loss, rhinorrhea and trouble swallowing.    Eyes:  Negative for discharge and visual disturbance.   Respiratory:  Negative for chest tightness and wheezing.    Cardiovascular:  Negative for chest pain and palpitations.   Gastrointestinal:  Negative for blood in stool, constipation, diarrhea and vomiting.   Endocrine: Negative for polydipsia and  polyuria.   Genitourinary:  Negative for difficulty urinating, hematuria and urgency.   Musculoskeletal:  Negative for arthralgias, joint swelling and neck pain.   Neurological:  Negative for weakness and headaches.   Psychiatric/Behavioral:  Positive for dysphoric mood. Negative for confusion.          Objective:      Physical Exam  Constitutional:       General: He is not in acute distress.     Appearance: He is well-developed.   HENT:      Head: Normocephalic and atraumatic.   Eyes:      General: Lids are normal. No scleral icterus.     Extraocular Movements: Extraocular movements intact.      Conjunctiva/sclera: Conjunctivae normal.   Pulmonary:      Effort: Pulmonary effort is normal.   Neurological:      Mental Status: He is alert and oriented to person, place, and time.   Psychiatric:         Mood and Affect: Mood and affect normal.         Assessment:       1. Situational mixed anxiety and depressive disorder        Plan:   1. Situational mixed anxiety and depressive disorder  Assessment & Plan:  After discussion we have agreed to start patient on Zoloft 25 mg daily; discussed risks/benefits of this medication; advised to notify me for any problems with medication; will plan to follow-up virtually in 1 month for reassessment; patient and family expressed understanding and agreement with plan      Orders:  -     sertraline (ZOLOFT) 25 MG tablet; Take 1 tablet (25 mg total) by mouth once daily.  Dispense: 30 tablet; Refill: 1      Reminded to obtain flu vaccine this Fall.

## 2023-10-26 ENCOUNTER — TELEPHONE (OUTPATIENT)
Dept: CARDIOLOGY | Facility: CLINIC | Age: 83
End: 2023-10-26
Payer: MEDICARE

## 2023-10-26 NOTE — TELEPHONE ENCOUNTER
Attempted to call pt's wife back. No answer, LVM w/ device check and 6m f/u info      ----- Message from Sancho Acevedo sent at 10/26/2023 11:05 AM CDT -----  Contact: [phone]  Calling regarding pt appt/ device check  . Please call back at  [phone] . Thanksdj

## 2023-10-31 ENCOUNTER — OFFICE VISIT (OUTPATIENT)
Dept: CARDIOLOGY | Facility: CLINIC | Age: 83
End: 2023-10-31
Payer: MEDICARE

## 2023-10-31 ENCOUNTER — HOSPITAL ENCOUNTER (OUTPATIENT)
Dept: CARDIOLOGY | Facility: HOSPITAL | Age: 83
Discharge: HOME | End: 2023-10-31
Attending: NURSE PRACTITIONER
Payer: MEDICARE

## 2023-10-31 VITALS
WEIGHT: 191.38 LBS | HEIGHT: 66 IN | SYSTOLIC BLOOD PRESSURE: 104 MMHG | BODY MASS INDEX: 30.76 KG/M2 | DIASTOLIC BLOOD PRESSURE: 60 MMHG

## 2023-10-31 DIAGNOSIS — I49.3 FREQUENT PVCS: Chronic | ICD-10-CM

## 2023-10-31 DIAGNOSIS — I50.9 CHF (NYHA CLASS III, ACC/AHA STAGE C): Chronic | ICD-10-CM

## 2023-10-31 DIAGNOSIS — I50.9 CHF (NYHA CLASS III, ACC/AHA STAGE C): ICD-10-CM

## 2023-10-31 DIAGNOSIS — I25.5 ISCHEMIC CARDIOMYOPATHY: ICD-10-CM

## 2023-10-31 DIAGNOSIS — Z95.810 ICD (IMPLANTABLE CARDIOVERTER-DEFIBRILLATOR) IN PLACE: ICD-10-CM

## 2023-10-31 DIAGNOSIS — Z95.810 AUTOMATIC IMPLANTABLE CARDIOVERTER-DEFIBRILLATOR IN SITU: ICD-10-CM

## 2023-10-31 DIAGNOSIS — I77.1 TORTUOUS AORTA: ICD-10-CM

## 2023-10-31 DIAGNOSIS — E78.2 MIXED HYPERLIPIDEMIA: ICD-10-CM

## 2023-10-31 DIAGNOSIS — I42.8 NICM (NONISCHEMIC CARDIOMYOPATHY): Primary | ICD-10-CM

## 2023-10-31 DIAGNOSIS — I10 ESSENTIAL HYPERTENSION: ICD-10-CM

## 2023-10-31 PROCEDURE — 3078F PR MOST RECENT DIASTOLIC BLOOD PRESSURE < 80 MM HG: ICD-10-PCS | Mod: HCNC,CPTII,S$GLB, | Performed by: PHYSICIAN ASSISTANT

## 2023-10-31 PROCEDURE — 99999 PR PBB SHADOW E&M-EST. PATIENT-LVL III: CPT | Mod: PBBFAC,HCNC,, | Performed by: PHYSICIAN ASSISTANT

## 2023-10-31 PROCEDURE — 1101F PT FALLS ASSESS-DOCD LE1/YR: CPT | Mod: HCNC,CPTII,S$GLB, | Performed by: PHYSICIAN ASSISTANT

## 2023-10-31 PROCEDURE — 1157F PR ADVANCE CARE PLAN OR EQUIV PRESENT IN MEDICAL RECORD: ICD-10-PCS | Mod: HCNC,CPTII,S$GLB, | Performed by: PHYSICIAN ASSISTANT

## 2023-10-31 PROCEDURE — 99214 OFFICE O/P EST MOD 30 MIN: CPT | Mod: HCNC,S$GLB,, | Performed by: PHYSICIAN ASSISTANT

## 2023-10-31 PROCEDURE — 3074F SYST BP LT 130 MM HG: CPT | Mod: HCNC,CPTII,S$GLB, | Performed by: PHYSICIAN ASSISTANT

## 2023-10-31 PROCEDURE — 1126F AMNT PAIN NOTED NONE PRSNT: CPT | Mod: HCNC,CPTII,S$GLB, | Performed by: PHYSICIAN ASSISTANT

## 2023-10-31 PROCEDURE — 3074F PR MOST RECENT SYSTOLIC BLOOD PRESSURE < 130 MM HG: ICD-10-PCS | Mod: HCNC,CPTII,S$GLB, | Performed by: PHYSICIAN ASSISTANT

## 2023-10-31 PROCEDURE — 93283 PRGRMG EVAL IMPLANTABLE DFB: CPT | Mod: HCNC

## 2023-10-31 PROCEDURE — 93283 PRGRMG EVAL IMPLANTABLE DFB: CPT | Mod: 26,HCNC,, | Performed by: INTERNAL MEDICINE

## 2023-10-31 PROCEDURE — 1159F PR MEDICATION LIST DOCUMENTED IN MEDICAL RECORD: ICD-10-PCS | Mod: HCNC,CPTII,S$GLB, | Performed by: PHYSICIAN ASSISTANT

## 2023-10-31 PROCEDURE — 93283 CARDIAC DEVICE CHECK - IN CLINIC & HOSPITAL: ICD-10-PCS | Mod: 26,HCNC,, | Performed by: INTERNAL MEDICINE

## 2023-10-31 PROCEDURE — 99999 PR PBB SHADOW E&M-EST. PATIENT-LVL III: ICD-10-PCS | Mod: PBBFAC,HCNC,, | Performed by: PHYSICIAN ASSISTANT

## 2023-10-31 PROCEDURE — 1126F PR PAIN SEVERITY QUANTIFIED, NO PAIN PRESENT: ICD-10-PCS | Mod: HCNC,CPTII,S$GLB, | Performed by: PHYSICIAN ASSISTANT

## 2023-10-31 PROCEDURE — 99214 PR OFFICE/OUTPT VISIT, EST, LEVL IV, 30-39 MIN: ICD-10-PCS | Mod: HCNC,S$GLB,, | Performed by: PHYSICIAN ASSISTANT

## 2023-10-31 PROCEDURE — 3288F PR FALLS RISK ASSESSMENT DOCUMENTED: ICD-10-PCS | Mod: HCNC,CPTII,S$GLB, | Performed by: PHYSICIAN ASSISTANT

## 2023-10-31 PROCEDURE — 3078F DIAST BP <80 MM HG: CPT | Mod: HCNC,CPTII,S$GLB, | Performed by: PHYSICIAN ASSISTANT

## 2023-10-31 PROCEDURE — 1101F PR PT FALLS ASSESS DOC 0-1 FALLS W/OUT INJ PAST YR: ICD-10-PCS | Mod: HCNC,CPTII,S$GLB, | Performed by: PHYSICIAN ASSISTANT

## 2023-10-31 PROCEDURE — 1157F ADVNC CARE PLAN IN RCRD: CPT | Mod: HCNC,CPTII,S$GLB, | Performed by: PHYSICIAN ASSISTANT

## 2023-10-31 PROCEDURE — 3288F FALL RISK ASSESSMENT DOCD: CPT | Mod: HCNC,CPTII,S$GLB, | Performed by: PHYSICIAN ASSISTANT

## 2023-10-31 PROCEDURE — 1159F MED LIST DOCD IN RCRD: CPT | Mod: HCNC,CPTII,S$GLB, | Performed by: PHYSICIAN ASSISTANT

## 2023-10-31 NOTE — PROGRESS NOTES
Subjective:   Patient ID:  Curtis Landry is a 82 y.o. male who presents for follow-up of CHF/AICD check    HPI  Mr. Landry is an 82 year old male patient whose current medical conditions include NICM with recovered EF to 50% by TTE on 6/2020, s/p AICD, HTN, hyperlipidemia, PVC's, and NSVT who presents today for routine follow-up. He returns today, accompanied by his cousin. No CV complaints. Denies any chest pain, heaviness, or tightness. No SOB/CORBIN. No lightheadedness, dizziness, palpitations, near syncope, or syncope. No s/s suggestive of CHF. BP stable and controlled. Patient is compliant with his medications. Admits he doesn't really watch his salt intake.     AICD interrogation today showed normal device function, no episodes of afib, brief episode of NSVT. No s/s of TIA/CVA.      Review of Systems   Constitutional: Positive for malaise/fatigue. Negative for chills, decreased appetite and fever.   HENT:  Negative for congestion, hoarse voice and sore throat.    Eyes:  Negative for blurred vision and discharge.        Visually impaired     Cardiovascular:  Negative for chest pain, claudication, cyanosis, dyspnea on exertion, irregular heartbeat, leg swelling, near-syncope, orthopnea, palpitations and paroxysmal nocturnal dyspnea.   Respiratory:  Negative for cough, hemoptysis, shortness of breath, snoring, sputum production and wheezing.    Endocrine: Negative for cold intolerance and heat intolerance.   Hematologic/Lymphatic: Negative for bleeding problem. Does not bruise/bleed easily.   Skin:  Negative for rash.   Musculoskeletal:  Positive for arthritis and joint pain. Negative for back pain, joint swelling, muscle cramps, muscle weakness and myalgias.   Gastrointestinal:  Negative for abdominal pain, constipation, diarrhea, heartburn, melena and nausea.   Genitourinary:  Negative for hematuria.   Neurological:  Negative for dizziness, focal weakness, headaches, light-headedness, loss of balance, numbness,  "paresthesias, seizures and weakness.   Psychiatric/Behavioral:  Negative for memory loss. The patient does not have insomnia.    Allergic/Immunologic: Negative for hives.     /60 (BP Location: Right arm, Patient Position: Sitting, BP Method: Large (Manual))   Ht 5' 6" (1.676 m)   Wt 86.8 kg (191 lb 5.8 oz)   BMI 30.89 kg/m²     Objective:   Physical Exam  Vitals and nursing note reviewed.   Constitutional:       General: He is not in acute distress.     Appearance: Normal appearance. He is well-developed. He is not diaphoretic.   HENT:      Head: Normocephalic and atraumatic.   Eyes:      General:         Right eye: No discharge.         Left eye: No discharge.      Pupils: Pupils are equal, round, and reactive to light.   Cardiovascular:      Rate and Rhythm: Normal rate and regular rhythm.      Heart sounds: Normal heart sounds, S1 normal and S2 normal. No murmur heard.     Comments: AICD site well-healed   Pulmonary:      Effort: Pulmonary effort is normal. No respiratory distress.      Breath sounds: Normal breath sounds. No wheezing or rales.   Abdominal:      General: There is no distension.      Palpations: Abdomen is soft.      Tenderness: There is no rebound.   Musculoskeletal:      Right lower leg: No edema.      Left lower leg: No edema.   Skin:     General: Skin is warm and dry.      Findings: No erythema.   Neurological:      General: No focal deficit present.      Mental Status: He is alert and oriented to person, place, and time.   Psychiatric:         Mood and Affect: Mood normal.         Behavior: Behavior normal.         Thought Content: Thought content normal.         Chemistry        Component Value Date/Time     06/08/2023 0909    K 4.1 06/08/2023 0909     06/08/2023 0909    CO2 25 06/08/2023 0909    BUN 12 06/08/2023 0909    CREATININE 1.2 06/08/2023 0909    GLU 95 06/08/2023 0909        Component Value Date/Time    CALCIUM 10.3 06/08/2023 0909    ALKPHOS 52 (L) 06/08/2023 " "0909    AST 19 06/08/2023 0909    ALT 13 06/08/2023 0909    BILITOT 0.7 06/08/2023 0909    ESTGFRAFRICA >60.0 03/24/2022 0743    EGFRNONAA 56.4 (A) 03/24/2022 0743        Lab Results   Component Value Date    CHOL 200 (H) 03/14/2023    CHOL 205 (H) 03/24/2022    CHOL 213 (H) 10/02/2020     Lab Results   Component Value Date    HDL 34 (L) 03/14/2023    HDL 33 (L) 03/24/2022    HDL 35 (L) 10/02/2020     Lab Results   Component Value Date    LDLCALC 130.8 03/14/2023    LDLCALC 132.6 03/24/2022    LDLCALC 137.2 10/02/2020     No results found for: "DLDL"  Lab Results   Component Value Date    TRIG 176 (H) 03/14/2023    TRIG 197 (H) 03/24/2022    TRIG 204 (H) 10/02/2020       f1   Lab Results   Component Value Date    CHOLHDL 17.0 (L) 03/14/2023    CHOLHDL 16.1 (L) 03/24/2022    CHOLHDL 16.4 (L) 10/02/2020     Lab Results   Component Value Date    WBC 5.65 06/08/2023    HGB 11.8 (L) 06/08/2023    HCT 35.0 (L) 06/08/2023    MCV 90 06/08/2023     06/08/2023         Assessment:      1. NICM (nonischemic cardiomyopathy)    2. Frequent PVCs    3. Mixed hyperlipidemia    4. Tortuous aorta    5. Automatic implantable cardioverter-defibrillator in situ    6. CHF (NYHA class III, ACC/AHA stage C)    7. Essential hypertension      Doing clinically well. No acute changes in status since last visit. CHF compensated. No CP. Brief run of NSVT in September, not new. No AICD shocks. Continue same meds/mgmt. PCP has labs scheduled in 3/24. RTC 4 months with updated echo.  Plan:   -Continue current medical management and risk factor modification  -Cardiac, low salt diet  -Fall precautions  -PCP has labs scheduled 3/24  -RTC with Kath Marin NP in 4 months with updated echo             "

## 2023-11-06 ENCOUNTER — TELEPHONE (OUTPATIENT)
Dept: INTERNAL MEDICINE | Facility: CLINIC | Age: 83
End: 2023-11-06
Payer: MEDICARE

## 2023-11-06 NOTE — TELEPHONE ENCOUNTER
----- Message from Pastor Richard sent at 11/6/2023  8:31 AM CST -----  Contact: Bela/Cousin  Bela is calling in regards to getting a call back wanting to know if it is time for pt to have pneumonia vaccine. Please call back at [phone]                        Thanks  KT

## 2023-11-08 DIAGNOSIS — F43.23 SITUATIONAL MIXED ANXIETY AND DEPRESSIVE DISORDER: ICD-10-CM

## 2023-11-08 RX ORDER — SERTRALINE HYDROCHLORIDE 25 MG/1
25 TABLET, FILM COATED ORAL
Qty: 90 TABLET | Refills: 3 | Status: SHIPPED | OUTPATIENT
Start: 2023-11-08 | End: 2023-12-26

## 2023-11-08 NOTE — TELEPHONE ENCOUNTER
No care due was identified.  [facility] Embedded Care Due Messages. Reference number: [id number].   11/08/2023 11:32:01 AM CST

## 2023-11-08 NOTE — TELEPHONE ENCOUNTER
Refill Routing Note   Medication(s) are not appropriate for processing by [facility] for the following reason(s):      New or recently adjusted medication    [facility] action(s):  Defer Care Due:  None identified     Medication Therapy Plan: Pharmacy comment: REQUEST FOR 90 DAYS PRESCRIPTION. DX Code Needed.      Appointments  past 12m or future 3m with PCP    Date Provider   Last Visit   10/17/2023 Liz Hall MD   Next Visit   11/27/2023 Liz Hall MD   ED visits in past 90 days: 0        Note composed:11:39 AM 11/08/2023

## 2023-11-28 ENCOUNTER — TELEPHONE (OUTPATIENT)
Dept: INTERNAL MEDICINE | Facility: CLINIC | Age: 83
End: 2023-11-28
Payer: MEDICARE

## 2023-11-28 NOTE — TELEPHONE ENCOUNTER
----- Message from Heidy Xavier sent at 11/28/2023  1:59 PM CST -----  Contact: Serina Weavernda, [phone]  Calling to request handicap tags. Please advise. Thanks.

## 2023-12-20 ENCOUNTER — TELEPHONE (OUTPATIENT)
Dept: INTERNAL MEDICINE | Facility: CLINIC | Age: 83
End: 2023-12-20
Payer: MEDICARE

## 2023-12-20 ENCOUNTER — LAB VISIT (OUTPATIENT)
Dept: LAB | Facility: HOSPITAL | Age: 83
End: 2023-12-20
Attending: NURSE PRACTITIONER
Payer: MEDICARE

## 2023-12-20 DIAGNOSIS — D47.2 SMOLDERING MYELOMA: ICD-10-CM

## 2023-12-20 DIAGNOSIS — D47.2 MGUS (MONOCLONAL GAMMOPATHY OF UNKNOWN SIGNIFICANCE): ICD-10-CM

## 2023-12-20 LAB
ALBUMIN SERPL BCP-MCNC: 4 G/DL (ref 3.5–5.2)
ALP SERPL-CCNC: 47 U/L (ref 55–135)
ALT SERPL W/O P-5'-P-CCNC: 14 U/L (ref 10–44)
ANION GAP SERPL CALC-SCNC: 8 MMOL/L (ref 8–16)
AST SERPL-CCNC: 15 U/L (ref 10–40)
B2 MICROGLOB SERPL-MCNC: 2.5 UG/ML (ref 0–2.5)
BASOPHILS # BLD AUTO: 0.02 K/UL (ref 0–0.2)
BASOPHILS NFR BLD: 0.3 % (ref 0–1.9)
BILIRUB SERPL-MCNC: 0.6 MG/DL (ref 0.1–1)
BUN SERPL-MCNC: 11 MG/DL (ref 8–23)
CALCIUM SERPL-MCNC: 10.1 MG/DL (ref 8.7–10.5)
CHLORIDE SERPL-SCNC: 106 MMOL/L (ref 95–110)
CO2 SERPL-SCNC: 25 MMOL/L (ref 23–29)
CREAT SERPL-MCNC: 1.4 MG/DL (ref 0.5–1.4)
DIFFERENTIAL METHOD: ABNORMAL
EOSINOPHIL # BLD AUTO: 0.1 K/UL (ref 0–0.5)
EOSINOPHIL NFR BLD: 2.1 % (ref 0–8)
ERYTHROCYTE [DISTWIDTH] IN BLOOD BY AUTOMATED COUNT: 12.1 % (ref 11.5–14.5)
EST. GFR  (NO RACE VARIABLE): 50 ML/MIN/1.73 M^2
GLUCOSE SERPL-MCNC: 100 MG/DL (ref 70–110)
HCT VFR BLD AUTO: 35.2 % (ref 40–54)
HGB BLD-MCNC: 11.9 G/DL (ref 14–18)
IGA SERPL-MCNC: 175 MG/DL (ref 40–350)
IGG SERPL-MCNC: 2277 MG/DL (ref 650–1600)
IGM SERPL-MCNC: 39 MG/DL (ref 50–300)
IMM GRANULOCYTES # BLD AUTO: 0.01 K/UL (ref 0–0.04)
IMM GRANULOCYTES NFR BLD AUTO: 0.2 % (ref 0–0.5)
LYMPHOCYTES # BLD AUTO: 2.6 K/UL (ref 1–4.8)
LYMPHOCYTES NFR BLD: 44.5 % (ref 18–48)
MCH RBC QN AUTO: 30.8 PG (ref 27–31)
MCHC RBC AUTO-ENTMCNC: 33.8 G/DL (ref 32–36)
MCV RBC AUTO: 91 FL (ref 82–98)
MONOCYTES # BLD AUTO: 0.4 K/UL (ref 0.3–1)
MONOCYTES NFR BLD: 7.3 % (ref 4–15)
NEUTROPHILS # BLD AUTO: 2.6 K/UL (ref 1.8–7.7)
NEUTROPHILS NFR BLD: 45.6 % (ref 38–73)
NRBC BLD-RTO: 0 /100 WBC
PLATELET # BLD AUTO: 228 K/UL (ref 150–450)
PMV BLD AUTO: 9.2 FL (ref 9.2–12.9)
POTASSIUM SERPL-SCNC: 4 MMOL/L (ref 3.5–5.1)
PROT SERPL-MCNC: 8.6 G/DL (ref 6–8.4)
RBC # BLD AUTO: 3.86 M/UL (ref 4.6–6.2)
SODIUM SERPL-SCNC: 139 MMOL/L (ref 136–145)
WBC # BLD AUTO: 5.77 K/UL (ref 3.9–12.7)

## 2023-12-20 PROCEDURE — 84165 PATHOLOGIST INTERPRETATION SPE: ICD-10-PCS | Mod: 26,HCNC,, | Performed by: PATHOLOGY

## 2023-12-20 PROCEDURE — 36415 COLL VENOUS BLD VENIPUNCTURE: CPT | Mod: HCNC,PO | Performed by: NURSE PRACTITIONER

## 2023-12-20 PROCEDURE — 83521 IG LIGHT CHAINS FREE EACH: CPT | Mod: 59,HCNC | Performed by: NURSE PRACTITIONER

## 2023-12-20 PROCEDURE — 85025 COMPLETE CBC W/AUTO DIFF WBC: CPT | Mod: HCNC | Performed by: NURSE PRACTITIONER

## 2023-12-20 PROCEDURE — 86334 IMMUNOFIX E-PHORESIS SERUM: CPT | Mod: HCNC | Performed by: NURSE PRACTITIONER

## 2023-12-20 PROCEDURE — 82784 ASSAY IGA/IGD/IGG/IGM EACH: CPT | Mod: 59,HCNC | Performed by: NURSE PRACTITIONER

## 2023-12-20 PROCEDURE — 82232 ASSAY OF BETA-2 PROTEIN: CPT | Mod: HCNC | Performed by: NURSE PRACTITIONER

## 2023-12-20 PROCEDURE — 84165 PROTEIN E-PHORESIS SERUM: CPT | Mod: 26,HCNC,, | Performed by: PATHOLOGY

## 2023-12-20 PROCEDURE — 80053 COMPREHEN METABOLIC PANEL: CPT | Mod: HCNC | Performed by: NURSE PRACTITIONER

## 2023-12-20 PROCEDURE — 86334 IMMUNOFIX E-PHORESIS SERUM: CPT | Mod: 26,HCNC,, | Performed by: PATHOLOGY

## 2023-12-20 PROCEDURE — 84165 PROTEIN E-PHORESIS SERUM: CPT | Mod: HCNC | Performed by: NURSE PRACTITIONER

## 2023-12-20 PROCEDURE — 86334 PATHOLOGIST INTERPRETATION IFE: ICD-10-PCS | Mod: 26,HCNC,, | Performed by: PATHOLOGY

## 2023-12-20 NOTE — TELEPHONE ENCOUNTER
----- Message from Isatu Flores sent at 12/20/2023 12:01 PM CST -----  Contact: Rosa/ Daughter  Pt daughter is calling in regards to getting a call back to discuss pt anxiety.  Please call back at [phone]        Thanks

## 2023-12-21 LAB
ALBUMIN SERPL ELPH-MCNC: 4.41 G/DL (ref 3.35–5.55)
ALPHA1 GLOB SERPL ELPH-MCNC: 0.27 G/DL (ref 0.17–0.41)
ALPHA2 GLOB SERPL ELPH-MCNC: 0.84 G/DL (ref 0.43–0.99)
B-GLOBULIN SERPL ELPH-MCNC: 0.77 G/DL (ref 0.5–1.1)
GAMMA GLOB SERPL ELPH-MCNC: 2.01 G/DL (ref 0.67–1.58)
INTERPRETATION SERPL IFE-IMP: NORMAL
KAPPA LC SER QL IA: 2.95 MG/DL (ref 0.33–1.94)
KAPPA LC/LAMBDA SER IA: 0.59 (ref 0.26–1.65)
LAMBDA LC SER QL IA: 4.98 MG/DL (ref 0.57–2.63)
PATHOLOGIST INTERPRETATION IFE: NORMAL
PATHOLOGIST INTERPRETATION SPE: NORMAL
PROT SERPL-MCNC: 8.3 G/DL (ref 6–8.4)

## 2023-12-26 ENCOUNTER — OFFICE VISIT (OUTPATIENT)
Dept: INTERNAL MEDICINE | Facility: CLINIC | Age: 83
End: 2023-12-26
Payer: MEDICARE

## 2023-12-26 DIAGNOSIS — F43.23 SITUATIONAL MIXED ANXIETY AND DEPRESSIVE DISORDER: ICD-10-CM

## 2023-12-26 PROCEDURE — 1101F PR PT FALLS ASSESS DOC 0-1 FALLS W/OUT INJ PAST YR: ICD-10-PCS | Mod: HCNC,CPTII,95, | Performed by: FAMILY MEDICINE

## 2023-12-26 PROCEDURE — 1159F PR MEDICATION LIST DOCUMENTED IN MEDICAL RECORD: ICD-10-PCS | Mod: HCNC,CPTII,95, | Performed by: FAMILY MEDICINE

## 2023-12-26 PROCEDURE — 1157F PR ADVANCE CARE PLAN OR EQUIV PRESENT IN MEDICAL RECORD: ICD-10-PCS | Mod: HCNC,CPTII,95, | Performed by: FAMILY MEDICINE

## 2023-12-26 PROCEDURE — 99214 OFFICE O/P EST MOD 30 MIN: CPT | Mod: HCNC,95,, | Performed by: FAMILY MEDICINE

## 2023-12-26 PROCEDURE — 1157F ADVNC CARE PLAN IN RCRD: CPT | Mod: HCNC,CPTII,95, | Performed by: FAMILY MEDICINE

## 2023-12-26 PROCEDURE — 1160F PR REVIEW ALL MEDS BY PRESCRIBER/CLIN PHARMACIST DOCUMENTED: ICD-10-PCS | Mod: HCNC,CPTII,95, | Performed by: FAMILY MEDICINE

## 2023-12-26 PROCEDURE — 1101F PT FALLS ASSESS-DOCD LE1/YR: CPT | Mod: HCNC,CPTII,95, | Performed by: FAMILY MEDICINE

## 2023-12-26 PROCEDURE — 99214 PR OFFICE/OUTPT VISIT, EST, LEVL IV, 30-39 MIN: ICD-10-PCS | Mod: HCNC,95,, | Performed by: FAMILY MEDICINE

## 2023-12-26 PROCEDURE — 1126F PR PAIN SEVERITY QUANTIFIED, NO PAIN PRESENT: ICD-10-PCS | Mod: HCNC,CPTII,95, | Performed by: FAMILY MEDICINE

## 2023-12-26 PROCEDURE — 1159F MED LIST DOCD IN RCRD: CPT | Mod: HCNC,CPTII,95, | Performed by: FAMILY MEDICINE

## 2023-12-26 PROCEDURE — 1126F AMNT PAIN NOTED NONE PRSNT: CPT | Mod: HCNC,CPTII,95, | Performed by: FAMILY MEDICINE

## 2023-12-26 PROCEDURE — 1160F RVW MEDS BY RX/DR IN RCRD: CPT | Mod: HCNC,CPTII,95, | Performed by: FAMILY MEDICINE

## 2023-12-26 PROCEDURE — 3288F FALL RISK ASSESSMENT DOCD: CPT | Mod: HCNC,CPTII,95, | Performed by: FAMILY MEDICINE

## 2023-12-26 PROCEDURE — 3288F PR FALLS RISK ASSESSMENT DOCUMENTED: ICD-10-PCS | Mod: HCNC,CPTII,95, | Performed by: FAMILY MEDICINE

## 2023-12-26 RX ORDER — SERTRALINE HYDROCHLORIDE 50 MG/1
50 TABLET, FILM COATED ORAL DAILY
Qty: 90 TABLET | Refills: 3 | Status: SHIPPED | OUTPATIENT
Start: 2023-12-26

## 2023-12-26 NOTE — ASSESSMENT & PLAN NOTE
After discussion, we have agreed to try increase of zoloft to 50 mg daily; if no improvement after 4-6 weeks advised follow up; patient and family expressed understanding and agreement with plan

## 2023-12-26 NOTE — PROGRESS NOTES
Subjective:       Patient ID: Curtis Landry is a 83 y.o. male.    Chief Complaint: Anxiety (Patient stated that he is still having some anxiety problems with the Zoloft that he has been taking. )    The patient location is: home  The chief complaint leading to consultation is: anxiety medication    Visit type: audiovisual    Face to Face time with patient: 3 minutes (chart review started 3:11 pm; video started 3:12 pm; video ended 3:15 pm)  6 minutes of total time spent on the encounter, which includes face to face time and non-face to face time preparing to see the patient (eg, review of tests), Obtaining and/or reviewing separately obtained history, Documenting clinical information in the electronic or other health record, Independently interpreting results (not separately reported) and communicating results to the patient/family/caregiver, or Care coordination (not separately reported).     Each patient to whom he or she provides medical services by telemedicine is:  (1) informed of the relationship between the physician and patient and the respective role of any other health care provider with respect to management of the patient; and (2) notified that he or she may decline to receive medical services by telemedicine and may withdraw from such care at any time.    Virtual visit to discuss anxiety medications. Patient reports not working as well as he would like. Family member present with him today.      Review of Systems   Constitutional:  Negative for activity change and unexpected weight change.   HENT:  Negative for hearing loss, rhinorrhea and trouble swallowing.    Eyes:  Negative for discharge and visual disturbance.   Respiratory:  Negative for chest tightness and wheezing.    Cardiovascular:  Negative for chest pain and palpitations.   Gastrointestinal:  Negative for blood in stool, constipation, diarrhea and vomiting.   Endocrine: Negative for polydipsia and polyuria.   Genitourinary:  Negative for  difficulty urinating, hematuria and urgency.   Musculoskeletal:  Negative for arthralgias, joint swelling and neck pain.   Neurological:  Negative for weakness and headaches.   Psychiatric/Behavioral:  Positive for confusion and dysphoric mood.          Objective:      Physical Exam  Vitals reviewed.   Constitutional:       General: He is not in acute distress.     Appearance: He is well-developed.   HENT:      Head: Normocephalic and atraumatic.   Eyes:      General: Lids are normal. No scleral icterus.     Extraocular Movements: Extraocular movements intact.      Conjunctiva/sclera: Conjunctivae normal.      Pupils: Pupils are equal, round, and reactive to light.   Pulmonary:      Effort: Pulmonary effort is normal.   Neurological:      Mental Status: He is alert and oriented to person, place, and time.      Cranial Nerves: No cranial nerve deficit.      Gait: Gait normal.   Psychiatric:         Mood and Affect: Mood and affect normal.         Assessment:       1. Situational mixed anxiety and depressive disorder        Plan:   1. Situational mixed anxiety and depressive disorder  Assessment & Plan:  After discussion, we have agreed to try increase of zoloft to 50 mg daily; if no improvement after 4-6 weeks advised follow up; patient and family expressed understanding and agreement with plan      Orders:  -     sertraline (ZOLOFT) 50 MG tablet; Take 1 tablet (50 mg total) by mouth once daily.  Dispense: 90 tablet; Refill: 3

## 2023-12-27 LAB
OHS CV AF BURDEN PERCENT: < 1
OHS CV DC REMOTE DEVICE TYPE: NORMAL
OHS CV RV PACING PERCENT: 1 %

## 2024-01-08 ENCOUNTER — TELEPHONE (OUTPATIENT)
Dept: HEMATOLOGY/ONCOLOGY | Facility: CLINIC | Age: 84
End: 2024-01-08
Payer: MEDICARE

## 2024-01-08 NOTE — TELEPHONE ENCOUNTER
Spoke with Bela regarding patient's appointment, she requested it to be virtual. Rescheduled appointment as virtual.

## 2024-01-08 NOTE — TELEPHONE ENCOUNTER
----- Message from Keerthi Manning sent at 1/8/2024  9:03 AM CST -----  Contact: Bela/ Spouse  Patient wife is requesting a call back concerning upcoming appointment, please call [phone]             Thanks

## 2024-01-11 ENCOUNTER — OFFICE VISIT (OUTPATIENT)
Dept: HEMATOLOGY/ONCOLOGY | Facility: CLINIC | Age: 84
End: 2024-01-11
Payer: MEDICARE

## 2024-01-11 DIAGNOSIS — Z85.46 HISTORY OF PROSTATE CANCER: ICD-10-CM

## 2024-01-11 DIAGNOSIS — D47.2 MGUS (MONOCLONAL GAMMOPATHY OF UNKNOWN SIGNIFICANCE): Primary | ICD-10-CM

## 2024-01-11 PROBLEM — C90.00 MULTIPLE MYELOMA NOT HAVING ACHIEVED REMISSION: Status: RESOLVED | Noted: 2023-03-10 | Resolved: 2024-01-11

## 2024-01-11 PROCEDURE — 1157F ADVNC CARE PLAN IN RCRD: CPT | Mod: CPTII,95,, | Performed by: NURSE PRACTITIONER

## 2024-01-11 PROCEDURE — 1159F MED LIST DOCD IN RCRD: CPT | Mod: CPTII,95,, | Performed by: NURSE PRACTITIONER

## 2024-01-11 PROCEDURE — 99214 OFFICE O/P EST MOD 30 MIN: CPT | Mod: 95,,, | Performed by: NURSE PRACTITIONER

## 2024-01-11 PROCEDURE — 1160F RVW MEDS BY RX/DR IN RCRD: CPT | Mod: CPTII,95,, | Performed by: NURSE PRACTITIONER

## 2024-01-11 NOTE — ASSESSMENT & PLAN NOTE
Laboratory review stable. No CRAB criteria to suggest progression. No B symptoms. Mild anemia unchanged from baseline 11-12 range. No other cytopenias    F/u 6 months with CBC, CMP, POLLY, FLC, Quantitative Immunoglobulins, Beta 2 labs, SPEP 1 week prior. Discussed S&S to report sooner

## 2024-02-22 ENCOUNTER — HOSPITAL ENCOUNTER (OUTPATIENT)
Dept: CARDIOLOGY | Facility: HOSPITAL | Age: 84
Discharge: HOME | End: 2024-02-22
Attending: PHYSICIAN ASSISTANT
Payer: MEDICARE

## 2024-02-22 VITALS
HEIGHT: 66 IN | BODY MASS INDEX: 30.7 KG/M2 | SYSTOLIC BLOOD PRESSURE: 104 MMHG | WEIGHT: 191 LBS | DIASTOLIC BLOOD PRESSURE: 60 MMHG

## 2024-02-22 DIAGNOSIS — I42.8 NICM (NONISCHEMIC CARDIOMYOPATHY): ICD-10-CM

## 2024-02-22 LAB
AORTIC ROOT ANNULUS: 3.14 CM
ASCENDING AORTA: 3.41 CM
AV INDEX (PROSTH): 0.68
AV MEAN GRADIENT: 2 MMHG
AV PEAK GRADIENT: 4 MMHG
AV VALVE AREA BY VELOCITY RATIO: 2.23 CM²
AV VALVE AREA: 1.9 CM²
AV VELOCITY RATIO: 0.79
BSA FOR ECHO PROCEDURE: 2.01 M2
CV ECHO LV RWT: 0.44 CM
DOP CALC AO PEAK VEL: 0.97 M/S
DOP CALC AO VTI: 21.2 CM
DOP CALC LVOT AREA: 2.8 CM2
DOP CALC LVOT DIAMETER: 1.89 CM
DOP CALC LVOT PEAK VEL: 0.77 M/S
DOP CALC LVOT STROKE VOLUME: 40.38 CM3
DOP CALC RVOT PEAK VEL: 0.62 M/S
DOP CALC RVOT VTI: 13.5 CM
DOP CALCLVOT PEAK VEL VTI: 14.4 CM
E WAVE DECELERATION TIME: 293.04 MSEC
E/A RATIO: 0.75
E/E' RATIO: 15.78 M/S
ECHO LV POSTERIOR WALL: 1.15 CM (ref 0.6–1.1)
FRACTIONAL SHORTENING: 19 % (ref 28–44)
INTERVENTRICULAR SEPTUM: 1.13 CM (ref 0.6–1.1)
IVRT: 110.37 MSEC
LA MAJOR: 5.43 CM
LA MINOR: 5.24 CM
LA WIDTH: 4.3 CM
LEFT ATRIUM SIZE: 4.25 CM
LEFT ATRIUM VOLUME INDEX: 42.3 ML/M2
LEFT ATRIUM VOLUME: 82.85 CM3
LEFT INTERNAL DIMENSION IN SYSTOLE: 4.22 CM (ref 2.1–4)
LEFT VENTRICLE DIASTOLIC VOLUME INDEX: 65.68 ML/M2
LEFT VENTRICLE DIASTOLIC VOLUME: 128.73 ML
LEFT VENTRICLE MASS INDEX: 118 G/M2
LEFT VENTRICLE SYSTOLIC VOLUME INDEX: 40.6 ML/M2
LEFT VENTRICLE SYSTOLIC VOLUME: 79.64 ML
LEFT VENTRICULAR INTERNAL DIMENSION IN DIASTOLE: 5.19 CM (ref 3.5–6)
LEFT VENTRICULAR MASS: 231.09 G
LV LATERAL E/E' RATIO: 14.2 M/S
LV SEPTAL E/E' RATIO: 17.75 M/S
LVOT MG: 1.29 MMHG
LVOT MV: 0.53 CM/S
MV PEAK A VEL: 0.95 M/S
MV PEAK E VEL: 0.71 M/S
MV STENOSIS PRESSURE HALF TIME: 84.98 MS
MV VALVE AREA P 1/2 METHOD: 2.59 CM2
PISA TR MAX VEL: 2.77 M/S
PULM VEIN S/D RATIO: 1.22
PV MEAN GRADIENT: 1 MMHG
PV MV: 0.56 M/S
PV PEAK D VEL: 0.36 M/S
PV PEAK GRADIENT: 3 MMHG
PV PEAK S VEL: 0.44 M/S
PV PEAK VELOCITY: 0.9 M/S
RA MAJOR: 5.2 CM
RA PRESSURE ESTIMATED: 8 MMHG
RA WIDTH: 3.11 CM
RIGHT VENTRICULAR END-DIASTOLIC DIMENSION: 3.83 CM
RV TB RVSP: 11 MMHG
SINUS: 3.28 CM
STJ: 3.32 CM
TDI LATERAL: 0.05 M/S
TDI SEPTAL: 0.04 M/S
TDI: 0.05 M/S
TR MAX PG: 31 MMHG
TRICUSPID ANNULAR PLANE SYSTOLIC EXCURSION: 1.59 CM
TV REST PULMONARY ARTERY PRESSURE: 39 MMHG
Z-SCORE OF LEFT VENTRICULAR DIMENSION IN END DIASTOLE: -0.78
Z-SCORE OF LEFT VENTRICULAR DIMENSION IN END SYSTOLE: 1.59

## 2024-02-22 PROCEDURE — 93306 TTE W/DOPPLER COMPLETE: CPT

## 2024-02-22 PROCEDURE — 93306 TTE W/DOPPLER COMPLETE: CPT | Mod: 26,,, | Performed by: INTERNAL MEDICINE

## 2024-02-23 ENCOUNTER — TELEPHONE (OUTPATIENT)
Dept: CARDIOLOGY | Facility: CLINIC | Age: 84
End: 2024-02-23
Payer: MEDICARE

## 2024-02-23 NOTE — TELEPHONE ENCOUNTER
Pt notified pb    ----- Message from Deepthi Merrill PA-C sent at 2/23/2024  2:14 PM CST -----  Echo reviewed. EF 45-50%. DD noted. Continue same meds/mgmt and f/u with Kath Marin NP as scheduled.

## 2024-02-23 NOTE — TELEPHONE ENCOUNTER
Daughter notified of results pb      ----- Message from Faviola Shafer sent at 2/23/2024  2:58 PM CST -----  Contact: Bela/Relative  Type:  Test Results    Who Called: Bela  Name of Test (Lab/Mammo/Etc): Echo  Date of Test: 2/22/24  Ordering Provider: Deepthi Merrill   Where the test was performed: [facility]  Would the patient rather a call back or a response via [facility]? call  Best Call Back Number: [phone]  Additional Information:  Patient's daughter request to be notified of patient's test results.  Thank you,  GH

## 2024-02-28 DIAGNOSIS — I10 ESSENTIAL HYPERTENSION: Primary | ICD-10-CM

## 2024-02-29 ENCOUNTER — HOSPITAL ENCOUNTER (OUTPATIENT)
Dept: CARDIOLOGY | Facility: HOSPITAL | Age: 84
Discharge: HOME | End: 2024-02-29
Payer: MEDICARE

## 2024-02-29 ENCOUNTER — OFFICE VISIT (OUTPATIENT)
Dept: CARDIOLOGY | Facility: CLINIC | Age: 84
End: 2024-02-29
Payer: MEDICARE

## 2024-02-29 VITALS
BODY MASS INDEX: 31.31 KG/M2 | SYSTOLIC BLOOD PRESSURE: 116 MMHG | OXYGEN SATURATION: 99 % | HEART RATE: 72 BPM | WEIGHT: 194 LBS | DIASTOLIC BLOOD PRESSURE: 60 MMHG

## 2024-02-29 DIAGNOSIS — D63.8 ANEMIA IN CHRONIC ILLNESS: ICD-10-CM

## 2024-02-29 DIAGNOSIS — I20.9 ANGINA PECTORIS, UNSPECIFIED: ICD-10-CM

## 2024-02-29 DIAGNOSIS — I10 ESSENTIAL HYPERTENSION: Primary | ICD-10-CM

## 2024-02-29 DIAGNOSIS — I50.9 CHF (NYHA CLASS III, ACC/AHA STAGE C): ICD-10-CM

## 2024-02-29 DIAGNOSIS — I10 ESSENTIAL HYPERTENSION: ICD-10-CM

## 2024-02-29 DIAGNOSIS — I77.1 TORTUOUS AORTA: ICD-10-CM

## 2024-02-29 DIAGNOSIS — I49.3 FREQUENT PVCS: Chronic | ICD-10-CM

## 2024-02-29 DIAGNOSIS — Z95.810 AUTOMATIC IMPLANTABLE CARDIOVERTER-DEFIBRILLATOR IN SITU: ICD-10-CM

## 2024-02-29 PROCEDURE — 1157F ADVNC CARE PLAN IN RCRD: CPT | Mod: CPTII,S$GLB,, | Performed by: NURSE PRACTITIONER

## 2024-02-29 PROCEDURE — 93010 ELECTROCARDIOGRAM REPORT: CPT | Mod: ,,, | Performed by: INTERNAL MEDICINE

## 2024-02-29 PROCEDURE — 1101F PT FALLS ASSESS-DOCD LE1/YR: CPT | Mod: CPTII,S$GLB,, | Performed by: NURSE PRACTITIONER

## 2024-02-29 PROCEDURE — 99214 OFFICE O/P EST MOD 30 MIN: CPT | Mod: S$GLB,,, | Performed by: NURSE PRACTITIONER

## 2024-02-29 PROCEDURE — 93005 ELECTROCARDIOGRAM TRACING: CPT

## 2024-02-29 PROCEDURE — 3078F DIAST BP <80 MM HG: CPT | Mod: CPTII,S$GLB,, | Performed by: NURSE PRACTITIONER

## 2024-02-29 PROCEDURE — 99999 PR PBB SHADOW E&M-EST. PATIENT-LVL III: CPT | Mod: PBBFAC,,, | Performed by: NURSE PRACTITIONER

## 2024-02-29 PROCEDURE — 3074F SYST BP LT 130 MM HG: CPT | Mod: CPTII,S$GLB,, | Performed by: NURSE PRACTITIONER

## 2024-02-29 PROCEDURE — 1159F MED LIST DOCD IN RCRD: CPT | Mod: CPTII,S$GLB,, | Performed by: NURSE PRACTITIONER

## 2024-02-29 PROCEDURE — 3288F FALL RISK ASSESSMENT DOCD: CPT | Mod: CPTII,S$GLB,, | Performed by: NURSE PRACTITIONER

## 2024-02-29 NOTE — PROGRESS NOTES
Subjective:   Patient ID:  Curtis Landry is a 83 y.o. male who presents for evaluation of No chief complaint on file.        HPI   Primary Cardiologist: Formerly Dr Boyd    Cardiac Problems:  1. CHF, NYHA FC III, EF recovered to 50% on 6/2020  2. NICM  3. S/P DC ICD ([employer])  4. HTN  5. HLP  6 Bigeminy    Curtis Landry returns to CHF clinic for routine follow up.     Denies chest pain or anginal equivalents. No shortness of breath, CORBIN or palpitations. Denies orthopnea, PND or abdominal bloating. Reports regular walking without any issues lately. NO leg swelling or claudications. No recent falls, syncope or near syncopal events. Reports compliance with medications and dietary restrictions. NO CNS complaints to suggest TIA or CVA today. No signs of abnormal bleeding on ASA daily    Device check completed today in office, brief AFib episodes noted on interrogation. Given fall risk, low burden and anemia will continue to monitor for now and continue with BB and ASA daily.     Uses cane for fall prevention.   Has been doing well with sodium and fluid restrictions.   Denies any acute issues today in office.     9/29/2022 update    Curtis Landry returns for follow up with device check. Well functioning device without any arrhythmias or ICD firing. He is doing well without any cardiac complaints. 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     Denies chest pain or anginal equivalents. No shortness of breath, CORBIN or palpitations. Denies orthopnea, PND or abdominal bloating. Reports regular walking without any issues lately. NO leg swelling or claudications. No recent falls, syncope or near syncopal events. Reports compliance with medications and dietary restrictions. NO CNS complaints to suggest TIA or CVA today. No signs of abnormal bleeding on ASA daily.     3/29/2023 update  He returns today and states he is doing ok. He is having more shortness of breath issues with exertional activities.      He is doing exercises while sitting in the chair without any issues.     BP well controlled today.     No leg swelling on exam today.   ICD interrogation completed today- well functioning device.     NO chest pain or anginal equivalents.   No dizziness, syncope or near syncopal events.     Reports compliance with medications. Needs to be more compliant with dietary restrictions.  No signs of abnormal bleeding on ASA daily.       2/29/2024 update    Curtis Landry returns for follow up.    Recent echo reviewed- EF 45-50%, Grade I DD    Continues to endorse shortness of breath with any amount of exertional activities.     Denies chest pain or anginal equivalents. Denies orthopnea, PND or abdominal bloating. Reports regular walking without any issues lately. NO leg swelling or claudications. No recent falls, syncope or near syncopal events. Reports compliance with medications and dietary restrictions. NO CNS complaints to suggest TIA or CVA today. No signs of abnormal bleeding on ASA daily.       Past Medical History:   Diagnosis Date    Anemia     Angina pectoris     Arthritis     CHF (congestive heart failure)     Glaucoma (increased eye pressure)     Followed by outside opthalmology    HTN (hypertension)     Hyperlipidemia     Macular degeneration     Pneumonia     did not require hospitalization    Prostate cancer     Prostate cancer     Situational mixed anxiety and depressive disorder 10/17/2023    Urinary incontinence        Past Surgical History:   Procedure Laterality Date    APPENDECTOMY      CARDIAC DEFIBRILLATOR PLACEMENT      CARDIAC PACEMAKER PLACEMENT      CARDIAC PACEMAKER PLACEMENT      EYE SURGERY      GALLBLADDER SURGERY      PROSTATECTOMY      TOTAL HIP ARTHROPLASTY         Social History     Tobacco Use    Smoking status: Former     Current packs/day: 0.00     Average packs/day: 0.1 packs/day for 10.0 years (1.0 ttl pk-yrs)     Types: Cigarettes     Start date: 1/1/1970     Quit date: 1/1/1980      Years since quittin.1    Smokeless tobacco: Never   Substance Use Topics    Alcohol use: No    Drug use: No       Family History   Problem Relation Age of Onset    Cancer Mother         pancreas    Cancer Father     No Known Problems Daughter     Diabetes Neg Hx     Heart disease Neg Hx     Hyperlipidemia Neg Hx     Hypertension Neg Hx     Kidney disease Neg Hx     Stroke Neg Hx      Wt Readings from Last 3 Encounters:   24 88 kg (194 lb 0.1 oz)   24 86.6 kg (191 lb)   10/31/23 86.8 kg (191 lb 5.8 oz)     Temp Readings from Last 3 Encounters:   10/12/23 96.9 °F (36.1 °C) (Tympanic)   10/03/23 97.8 °F (36.6 °C) (Oral)   23 98.1 °F (36.7 °C) (Tympanic)     BP Readings from Last 3 Encounters:   24 116/60   24 104/60   10/31/23 104/60     Pulse Readings from Last 3 Encounters:   24 72   10/12/23 74   10/03/23 70     Current Outpatient Medications on File Prior to Visit   Medication Sig Dispense Refill    acetaminophen (TYLENOL) 650 MG TbSR Take 1 tablet (650 mg total) by mouth every 8 (eight) hours.  0    aspirin (ECOTRIN) 81 MG EC tablet Take by mouth. 1 Tablet, Delayed Release (E.C.) Oral Every day      carvediloL (COREG) 12.5 MG tablet Take 1 tablet (12.5 mg total) by mouth 2 (two) times daily. 180 tablet 3    dorzolamide-timolol 2-0.5% (COSOPT) 22.3-6.8 mg/mL ophthalmic solution Place 1 drop into both eyes 2 (two) times daily.  4    ferrous gluconate (FERGON) 240 (27 FE) MG tablet Take 240 mg by mouth every other day.      losartan (COZAAR) 50 MG tablet Take 1 tablet (50 mg total) by mouth once daily. 90 tablet 3    sertraline (ZOLOFT) 50 MG tablet Take 1 tablet (50 mg total) by mouth once daily. 90 tablet 3    travoprost (TRAVATAN Z) 0.004 % ophthalmic solution Inject into the eye. 1 Drops Ophthalmic At bedtime       No current facility-administered medications on file prior to visit.       Review of Systems   Constitutional: Positive for malaise/fatigue.   HENT:   Negative for hearing loss and hoarse voice.    Eyes:  Negative for blurred vision and visual disturbance.   Cardiovascular:  Positive for dyspnea on exertion. Negative for chest pain, claudication, irregular heartbeat, leg swelling, near-syncope, orthopnea, palpitations, paroxysmal nocturnal dyspnea and syncope.   Respiratory:  Negative for cough, hemoptysis, shortness of breath, sleep disturbances due to breathing, snoring and wheezing.    Endocrine: Negative for cold intolerance and heat intolerance.   Hematologic/Lymphatic: Bruises/bleeds easily.   Skin:  Negative for color change, dry skin and nail changes.   Musculoskeletal:  Positive for arthritis and back pain. Negative for joint pain and myalgias.   Gastrointestinal:  Negative for bloating, abdominal pain, constipation, nausea and vomiting.   Genitourinary:  Negative for dysuria, flank pain, hematuria and hesitancy.   Neurological:  Negative for headaches, light-headedness, loss of balance, numbness, paresthesias and weakness.   Psychiatric/Behavioral:  Negative for altered mental status.    Allergic/Immunologic: Negative for environmental allergies.     /60 (BP Location: Right arm, Patient Position: Sitting)   Pulse 72   Wt 88 kg (194 lb 0.1 oz)   SpO2 99%   BMI 31.31 kg/m²     Objective:   Physical Exam  Vitals and nursing note reviewed.   Constitutional:       General: He is not in acute distress.     Appearance: Normal appearance. He is well-developed. He is not ill-appearing.   HENT:      Head: Normocephalic and atraumatic.      Nose: Nose normal.      Mouth/Throat:      Mouth: Mucous membranes are moist.   Eyes:      Pupils: Pupils are equal, round, and reactive to light.   Neck:      Thyroid: No thyromegaly.      Vascular: No JVD.      Trachea: No tracheal deviation.   Cardiovascular:      Rate and Rhythm: Normal rate and regular rhythm.      Chest Wall: PMI is not displaced.      Pulses: Intact distal pulses.           Radial pulses are 2+  on the right side and 2+ on the left side.        Dorsalis pedis pulses are 2+ on the right side and 2+ on the left side.      Heart sounds: S1 normal and S2 normal. Heart sounds not distant. No murmur heard.     Comments: Left Chest ICD site in excellent repair  No recent firing.   Pulmonary:      Effort: Pulmonary effort is normal. No respiratory distress.      Breath sounds: Normal breath sounds and air entry. No decreased breath sounds, wheezing or rales.   Abdominal:      General: Bowel sounds are normal.      Palpations: Abdomen is soft.   Musculoskeletal:         General: No swelling. Normal range of motion.      Cervical back: Full passive range of motion without pain, normal range of motion and neck supple.      Right ankle: No swelling.      Left ankle: No swelling.   Skin:     General: Skin is warm and dry.      Capillary Refill: Capillary refill takes less than 2 seconds.      Nails: There is no clubbing.   Neurological:      General: No focal deficit present.      Mental Status: He is alert and oriented to person, place, and time.   Psychiatric:         Mood and Affect: Mood normal.         Speech: Speech normal.         Behavior: Behavior normal.         Thought Content: Thought content normal.         Judgment: Judgment normal.         Lab Results   Component Value Date    CHOL 200 (H) 03/14/2023    CHOL 205 (H) 03/24/2022    CHOL 213 (H) 10/02/2020     Lab Results   Component Value Date    HDL 34 (L) 03/14/2023    HDL 33 (L) 03/24/2022    HDL 35 (L) 10/02/2020     Lab Results   Component Value Date    LDLCALC 130.8 03/14/2023    LDLCALC 132.6 03/24/2022    LDLCALC 137.2 10/02/2020     Lab Results   Component Value Date    TRIG 176 (H) 03/14/2023    TRIG 197 (H) 03/24/2022    TRIG 204 (H) 10/02/2020     Lab Results   Component Value Date    CHOLHDL 17.0 (L) 03/14/2023    CHOLHDL 16.1 (L) 03/24/2022    CHOLHDL 16.4 (L) 10/02/2020       Chemistry        Component Value Date/Time     12/20/2023  1149    K 4.0 12/20/2023 1149     12/20/2023 1149    CO2 25 12/20/2023 1149    BUN 11 12/20/2023 1149    CREATININE 1.4 12/20/2023 1149     12/20/2023 1149        Component Value Date/Time    CALCIUM 10.1 12/20/2023 1149    ALKPHOS 47 (L) 12/20/2023 1149    AST 15 12/20/2023 1149    ALT 14 12/20/2023 1149    BILITOT 0.6 12/20/2023 1149    ESTGFRAFRICA >60.0 03/24/2022 0743    EGFRNONAA 56.4 (A) 03/24/2022 0743          Lab Results   Component Value Date    TSH 1.563 03/14/2023     Lab Results   Component Value Date    INR 1.1 01/18/2021    INR 1.1 05/14/2016    INR 1.1 01/31/2013     Lab Results   Component Value Date    WBC 5.77 12/20/2023    HGB 11.9 (L) 12/20/2023    HCT 35.2 (L) 12/20/2023    MCV 91 12/20/2023     12/20/2023      1. TTE  Results for orders placed during the hospital encounter of 02/22/24    Echo    Interpretation Summary    Left Ventricle: The left ventricle is mildly dilated. Mildly increased wall thickness. There is concentric remodeling. Normal wall motion. Septal motion is consistent with pacing. There is mildly reduced systolic function with a visually estimated ejection fraction of 45 - 50%. Grade I diastolic dysfunction.    Right Ventricle: Normal right ventricular cavity size. Wall thickness is normal. Right ventricle wall motion  is normal. Systolic function is normal. Pacemaker lead present in the ventricle.    Mitral Valve: There is mild to moderate regurgitation.    Tricuspid Valve: There is mild to moderate regurgitation.    Pulmonary Artery: The estimated pulmonary artery systolic pressure is 39 mmHg.    IVC/SVC: Intermediate venous pressure at 8 mmHg.      Assessment:      1. Essential hypertension    2. CHF (NYHA class III, ACC/AHA stage C)    3. Angina pectoris, unspecified    4. Tortuous aorta    5. Automatic implantable cardioverter-defibrillator in situ    6. Frequent PVCs    7. Anemia in chronic illness            Plan:   NICM (nonischemic  cardiomyopathy)  -continue Coreg and Losartan  -Continue routine device interrogations every 6 months  -No recent ICD firing  -Continue DASH diet,2  Gm sodium restriction  -1500 ml fluid restriction  -Monitor BP/Hr at home  -Declines med adjustment today       Anemia in chronic illness  -Continue Iron supplement  -Follow up with Hem/Onc as scheduled    HTN  -Continue BB, ARB      RTC in 6 months with device check      CHF SYNOPSIS:    GDMT:  ACE/ARB/ARNI:  Losartan 50 mg daily  Beta Blocker: Coreg 12.5 mg BID  MRA:  none  SGLT2: none  Diuretic:  none      Nicole May, FNP-C [facility]

## 2024-03-01 LAB
OHS QRS DURATION: 120 MS
OHS QTC CALCULATION: 421 MS

## 2024-03-12 ENCOUNTER — LAB VISIT (OUTPATIENT)
Dept: LAB | Facility: HOSPITAL | Age: 84
End: 2024-03-12
Attending: FAMILY MEDICINE
Payer: MEDICARE

## 2024-03-12 DIAGNOSIS — I10 ESSENTIAL HYPERTENSION: ICD-10-CM

## 2024-03-12 LAB
ALBUMIN SERPL BCP-MCNC: 3.8 G/DL (ref 3.5–5.2)
ALP SERPL-CCNC: 46 U/L (ref 55–135)
ALT SERPL W/O P-5'-P-CCNC: 11 U/L (ref 10–44)
ANION GAP SERPL CALC-SCNC: 6 MMOL/L (ref 8–16)
AST SERPL-CCNC: 17 U/L (ref 10–40)
BASOPHILS # BLD AUTO: 0.04 K/UL (ref 0–0.2)
BASOPHILS NFR BLD: 0.8 % (ref 0–1.9)
BILIRUB SERPL-MCNC: 0.6 MG/DL (ref 0.1–1)
BUN SERPL-MCNC: 11 MG/DL (ref 8–23)
CALCIUM SERPL-MCNC: 10.5 MG/DL (ref 8.7–10.5)
CHLORIDE SERPL-SCNC: 109 MMOL/L (ref 95–110)
CHOLEST SERPL-MCNC: 221 MG/DL (ref 120–199)
CHOLEST/HDLC SERPL: 7.6 {RATIO} (ref 2–5)
CO2 SERPL-SCNC: 25 MMOL/L (ref 23–29)
CREAT SERPL-MCNC: 1.3 MG/DL (ref 0.5–1.4)
DIFFERENTIAL METHOD BLD: ABNORMAL
EOSINOPHIL # BLD AUTO: 0.2 K/UL (ref 0–0.5)
EOSINOPHIL NFR BLD: 2.8 % (ref 0–8)
ERYTHROCYTE [DISTWIDTH] IN BLOOD BY AUTOMATED COUNT: 12.6 % (ref 11.5–14.5)
EST. GFR  (NO RACE VARIABLE): 54.5 ML/MIN/1.73 M^2
GLUCOSE SERPL-MCNC: 99 MG/DL (ref 70–110)
HCT VFR BLD AUTO: 32.5 % (ref 40–54)
HDLC SERPL-MCNC: 29 MG/DL (ref 40–75)
HDLC SERPL: 13.1 % (ref 20–50)
HGB BLD-MCNC: 11.2 G/DL (ref 14–18)
IMM GRANULOCYTES # BLD AUTO: 0.01 K/UL (ref 0–0.04)
IMM GRANULOCYTES NFR BLD AUTO: 0.2 % (ref 0–0.5)
LDLC SERPL CALC-MCNC: 138 MG/DL (ref 63–159)
LYMPHOCYTES # BLD AUTO: 1.8 K/UL (ref 1–4.8)
LYMPHOCYTES NFR BLD: 34.5 % (ref 18–48)
MCH RBC QN AUTO: 31.8 PG (ref 27–31)
MCHC RBC AUTO-ENTMCNC: 34.5 G/DL (ref 32–36)
MCV RBC AUTO: 92 FL (ref 82–98)
MONOCYTES # BLD AUTO: 0.5 K/UL (ref 0.3–1)
MONOCYTES NFR BLD: 8.5 % (ref 4–15)
NEUTROPHILS # BLD AUTO: 2.8 K/UL (ref 1.8–7.7)
NEUTROPHILS NFR BLD: 53.2 % (ref 38–73)
NONHDLC SERPL-MCNC: 192 MG/DL
NRBC BLD-RTO: 0 /100 WBC
PLATELET # BLD AUTO: 206 K/UL (ref 150–450)
PMV BLD AUTO: 9.5 FL (ref 9.2–12.9)
POTASSIUM SERPL-SCNC: 4.2 MMOL/L (ref 3.5–5.1)
PROT SERPL-MCNC: 8.2 G/DL (ref 6–8.4)
RBC # BLD AUTO: 3.52 M/UL (ref 4.6–6.2)
SODIUM SERPL-SCNC: 140 MMOL/L (ref 136–145)
TRIGL SERPL-MCNC: 270 MG/DL (ref 30–150)
TSH SERPL DL<=0.005 MIU/L-ACNC: 1.05 UIU/ML (ref 0.4–4)
WBC # BLD AUTO: 5.3 K/UL (ref 3.9–12.7)

## 2024-03-12 PROCEDURE — 84443 ASSAY THYROID STIM HORMONE: CPT | Performed by: FAMILY MEDICINE

## 2024-03-12 PROCEDURE — 36415 COLL VENOUS BLD VENIPUNCTURE: CPT | Mod: PO | Performed by: FAMILY MEDICINE

## 2024-03-12 PROCEDURE — 80061 LIPID PANEL: CPT | Performed by: FAMILY MEDICINE

## 2024-03-12 PROCEDURE — 80053 COMPREHEN METABOLIC PANEL: CPT | Performed by: FAMILY MEDICINE

## 2024-03-12 PROCEDURE — 85025 COMPLETE CBC W/AUTO DIFF WBC: CPT | Performed by: FAMILY MEDICINE

## 2024-03-19 ENCOUNTER — OFFICE VISIT (OUTPATIENT)
Dept: INTERNAL MEDICINE | Facility: CLINIC | Age: 84
End: 2024-03-19
Payer: MEDICARE

## 2024-03-19 VITALS
HEART RATE: 101 BPM | SYSTOLIC BLOOD PRESSURE: 96 MMHG | OXYGEN SATURATION: 97 % | DIASTOLIC BLOOD PRESSURE: 62 MMHG | WEIGHT: 193.44 LBS | BODY MASS INDEX: 31.22 KG/M2

## 2024-03-19 DIAGNOSIS — N18.31 CHRONIC KIDNEY DISEASE, STAGE 3A: ICD-10-CM

## 2024-03-19 DIAGNOSIS — I77.1 TORTUOUS AORTA: ICD-10-CM

## 2024-03-19 DIAGNOSIS — F43.23 SITUATIONAL MIXED ANXIETY AND DEPRESSIVE DISORDER: ICD-10-CM

## 2024-03-19 DIAGNOSIS — I10 ESSENTIAL HYPERTENSION: ICD-10-CM

## 2024-03-19 DIAGNOSIS — D63.8 ANEMIA IN CHRONIC ILLNESS: ICD-10-CM

## 2024-03-19 DIAGNOSIS — I42.8 NICM (NONISCHEMIC CARDIOMYOPATHY): ICD-10-CM

## 2024-03-19 DIAGNOSIS — H54.3 BLINDNESS OF BOTH EYES: ICD-10-CM

## 2024-03-19 DIAGNOSIS — D47.2 MGUS (MONOCLONAL GAMMOPATHY OF UNKNOWN SIGNIFICANCE): ICD-10-CM

## 2024-03-19 DIAGNOSIS — E78.2 MIXED HYPERLIPIDEMIA: ICD-10-CM

## 2024-03-19 DIAGNOSIS — H43.11 VITREOUS HEMORRHAGE, RIGHT EYE: ICD-10-CM

## 2024-03-19 DIAGNOSIS — I50.9 CHF (NYHA CLASS III, ACC/AHA STAGE C): Chronic | ICD-10-CM

## 2024-03-19 DIAGNOSIS — Z00.00 ROUTINE GENERAL MEDICAL EXAMINATION AT A HEALTH CARE FACILITY: Primary | ICD-10-CM

## 2024-03-19 PROCEDURE — 1126F AMNT PAIN NOTED NONE PRSNT: CPT | Mod: CPTII,S$GLB,, | Performed by: PHYSICIAN ASSISTANT

## 2024-03-19 PROCEDURE — 99999 PR PBB SHADOW E&M-EST. PATIENT-LVL III: CPT | Mod: PBBFAC,,, | Performed by: PHYSICIAN ASSISTANT

## 2024-03-19 PROCEDURE — 1157F ADVNC CARE PLAN IN RCRD: CPT | Mod: CPTII,S$GLB,, | Performed by: PHYSICIAN ASSISTANT

## 2024-03-19 PROCEDURE — 1159F MED LIST DOCD IN RCRD: CPT | Mod: CPTII,S$GLB,, | Performed by: PHYSICIAN ASSISTANT

## 2024-03-19 PROCEDURE — 3288F FALL RISK ASSESSMENT DOCD: CPT | Mod: CPTII,S$GLB,, | Performed by: PHYSICIAN ASSISTANT

## 2024-03-19 PROCEDURE — 99397 PER PM REEVAL EST PAT 65+ YR: CPT | Mod: S$GLB,,, | Performed by: PHYSICIAN ASSISTANT

## 2024-03-19 PROCEDURE — 3074F SYST BP LT 130 MM HG: CPT | Mod: CPTII,S$GLB,, | Performed by: PHYSICIAN ASSISTANT

## 2024-03-19 PROCEDURE — 1160F RVW MEDS BY RX/DR IN RCRD: CPT | Mod: CPTII,S$GLB,, | Performed by: PHYSICIAN ASSISTANT

## 2024-03-19 PROCEDURE — 1101F PT FALLS ASSESS-DOCD LE1/YR: CPT | Mod: CPTII,S$GLB,, | Performed by: PHYSICIAN ASSISTANT

## 2024-03-19 PROCEDURE — 3078F DIAST BP <80 MM HG: CPT | Mod: CPTII,S$GLB,, | Performed by: PHYSICIAN ASSISTANT

## 2024-03-19 NOTE — PROGRESS NOTES
Subjective:       Patient ID: Curtis Landry is a 83 y.o. male.    Chief Complaint: Annual Exam      Patient presents to clinic today for annual physical exam.        Review of Systems   Constitutional:  Negative for chills, fatigue, fever and unexpected weight change.   HENT:  Positive for hearing loss (ongoing). Negative for congestion, dental problem, ear pain, rhinorrhea and trouble swallowing.    Eyes:  Positive for pain (chronic) and visual disturbance (chronic).   Respiratory:  Negative for cough and shortness of breath.    Cardiovascular:  Negative for chest pain, palpitations and leg swelling.   Gastrointestinal:  Negative for abdominal distention, abdominal pain, blood in stool, constipation, diarrhea, nausea and vomiting.   Genitourinary:  Negative for difficulty urinating, scrotal swelling and testicular pain.   Musculoskeletal:  Positive for myalgias (ongoing). Negative for arthralgias.   Skin:  Negative for rash.   Neurological:  Negative for dizziness, weakness, numbness and headaches.   Hematological:  Negative for adenopathy. Does not bruise/bleed easily.   Psychiatric/Behavioral:  Negative for dysphoric mood and sleep disturbance. The patient is nervous/anxious (ongoing).        Objective:      Physical Exam  Vitals and nursing note reviewed.   Constitutional:       General: He is not in acute distress.     Appearance: He is well-developed.      Comments: Ambulatory with white cane   HENT:      Head: Normocephalic and atraumatic.      Right Ear: Tympanic membrane, ear canal and external ear normal.      Left Ear: Tympanic membrane, ear canal and external ear normal.      Nose: Nose normal.      Mouth/Throat:      Lips: Pink.      Mouth: Mucous membranes are moist.      Pharynx: Oropharynx is clear. Uvula midline.   Eyes:      General: Lids are normal. No scleral icterus.     Conjunctiva/sclera: Conjunctivae normal.      Pupils: Pupils are equal, round, and reactive to light.   Neck:      Thyroid:  No thyromegaly.   Cardiovascular:      Rate and Rhythm: Normal rate and regular rhythm.      Pulses: Normal pulses.   Pulmonary:      Effort: Pulmonary effort is normal.      Breath sounds: Normal breath sounds. No wheezing or rales.   Musculoskeletal:         General: No tenderness. Normal range of motion.      Cervical back: Normal range of motion and neck supple.      Right lower leg: No edema.      Left lower leg: No edema.   Lymphadenopathy:      Cervical: No cervical adenopathy.   Skin:     General: Skin is warm and dry.      Findings: No rash.   Neurological:      Mental Status: He is alert.      Cranial Nerves: No cranial nerve deficit.   Psychiatric:         Mood and Affect: Mood and affect normal.         Assessment:       1. Routine general medical examination at a health care facility    2. Situational mixed anxiety and depressive disorder    3. Blindness of both eyes    4. CHF (NYHA class III, ACC/AHA stage C)    5. Tortuous aorta    6. NICM (nonischemic cardiomyopathy)    7. Mixed hyperlipidemia    8. Essential hypertension    9. Chronic kidney disease, stage 3a    10. MGUS (monoclonal gammopathy of unknown significance)    11. Anemia in chronic illness    12. Vitreous hemorrhage, right eye        Plan:   1. Routine general medical examination at a health care facility    2. Situational mixed anxiety and depressive disorder  Overview:  Stable on Zoloft      3. Blindness of both eyes    4. CHF (NYHA class III, ACC/AHA stage C)  Overview:  Followed by Cardiology, continue current treatment plan       5. Tortuous aorta  Overview:  CXR 1/2021, on ASA      6. NICM (nonischemic cardiomyopathy)  Overview:  Followed by Cardiology, continue current treatment plan       7. Mixed hyperlipidemia  Assessment & Plan:  Stable, diet and lifestyle changes discussed and encouraged   Lab Results   Component Value Date    CHOL 221 (H) 03/12/2024    LDLCALC 138.0 03/12/2024    TRIG 270 (H) 03/12/2024    HDL 29 (L)  03/12/2024    ALT 11 03/12/2024    AST 17 03/12/2024    ALKPHOS 46 (L) 03/12/2024        Orders:  -     Comprehensive Metabolic Panel; Future; Expected date: 09/19/2024  -     Lipid Panel; Future; Expected date: 09/19/2024    8. Essential hypertension  Assessment & Plan:  BP 96/62, asymptomatic, continue carvedilol, losartan  Lab Results   Component Value Date     03/12/2024    K 4.2 03/12/2024    BUN 11 03/12/2024    CREATININE 1.3 03/12/2024    EGFRNORACEVR 54.5 (A) 03/12/2024          9. Chronic kidney disease, stage 3a  Assessment & Plan:  Stable, no NSAIDs  Lab Results   Component Value Date    BUN 11 03/12/2024    CREATININE 1.3 03/12/2024    EGFRNORACEVR 54.5 (A) 03/12/2024          10. MGUS (monoclonal gammopathy of unknown significance)  Overview:  Followed by Hematology/Oncology, continue current treatment plan       11. Anemia in chronic illness  Assessment & Plan:  Stable  Lab Results   Component Value Date    RBC 3.52 (L) 03/12/2024    HGB 11.2 (L) 03/12/2024    HCT 32.5 (L) 03/12/2024    IRON 79 10/02/2020    FOLATE 10.0 10/02/2020    MCV 92 03/12/2024          12. Vitreous hemorrhage, right eye  Overview:  Followed by Ophthalmology, continue with current treatment plan           Recent labs reviewed with patient.    Discussed TDAP vaccine, advised can be obtained at pharmacy.   6 month f/u with Dr. Hall scheduled with fasting labs PTA   Health Maintenance reviewed/updated.

## 2024-03-20 NOTE — ASSESSMENT & PLAN NOTE
BP 96/62, asymptomatic, continue carvedilol, losartan  Lab Results   Component Value Date     03/12/2024    K 4.2 03/12/2024    BUN 11 03/12/2024    CREATININE 1.3 03/12/2024    EGFRNORACEVR 54.5 (A) 03/12/2024

## 2024-03-20 NOTE — ASSESSMENT & PLAN NOTE
Stable, no NSAIDs  Lab Results   Component Value Date    BUN 11 03/12/2024    CREATININE 1.3 03/12/2024    EGFRNORACEVR 54.5 (A) 03/12/2024

## 2024-03-20 NOTE — ASSESSMENT & PLAN NOTE
Stable  Lab Results   Component Value Date    RBC 3.52 (L) 03/12/2024    HGB 11.2 (L) 03/12/2024    HCT 32.5 (L) 03/12/2024    IRON 79 10/02/2020    FOLATE 10.0 10/02/2020    MCV 92 03/12/2024

## 2024-03-20 NOTE — ASSESSMENT & PLAN NOTE
Stable, diet and lifestyle changes discussed and encouraged   Lab Results   Component Value Date    CHOL 221 (H) 03/12/2024    LDLCALC 138.0 03/12/2024    TRIG 270 (H) 03/12/2024    HDL 29 (L) 03/12/2024    ALT 11 03/12/2024    AST 17 03/12/2024    ALKPHOS 46 (L) 03/12/2024

## 2024-04-01 ENCOUNTER — TELEPHONE (OUTPATIENT)
Dept: INTERNAL MEDICINE | Facility: CLINIC | Age: 84
End: 2024-04-01
Payer: MEDICARE

## 2024-04-01 NOTE — TELEPHONE ENCOUNTER
Spoke with daughter, notified her pt will need an appointment. She states her sister will call back to schedule appt.

## 2024-04-01 NOTE — TELEPHONE ENCOUNTER
----- Message from Maryann Romo sent at 4/1/2024  1:01 PM CDT -----  Contact: [phone]  Rosa is requesting a call in regards to patient. Pt is having dizziness and pain in right eye. She was offered an appt with JOSLYN Ordoñez for tomorrow and denied appt. Please call her back at [phone]. Thanks KB

## 2024-04-17 ENCOUNTER — TELEPHONE (OUTPATIENT)
Dept: INTERNAL MEDICINE | Facility: CLINIC | Age: 84
End: 2024-04-17
Payer: MEDICARE

## 2024-04-17 NOTE — TELEPHONE ENCOUNTER
----- Message from Ricki Acevedo sent at 4/17/2024 12:41 PM CDT -----  Contact: Bela/relative  Bela is needing a call back in regards to confirming that the patients paperwork was left at the . Please give her a call back at [phone]

## 2024-04-30 DIAGNOSIS — I10 ESSENTIAL HYPERTENSION: ICD-10-CM

## 2024-04-30 RX ORDER — CARVEDILOL 12.5 MG/1
12.5 TABLET ORAL 2 TIMES DAILY
Qty: 180 TABLET | Refills: 3 | Status: SHIPPED | OUTPATIENT
Start: 2024-04-30

## 2024-06-27 DIAGNOSIS — I10 ESSENTIAL HYPERTENSION: ICD-10-CM

## 2024-06-27 DIAGNOSIS — I50.9 CHF (NYHA CLASS III, ACC/AHA STAGE C): Chronic | ICD-10-CM

## 2024-06-27 RX ORDER — LOSARTAN POTASSIUM 50 MG/1
50 TABLET ORAL
Qty: 90 TABLET | Refills: 3 | Status: SHIPPED | OUTPATIENT
Start: 2024-06-27

## 2024-07-22 ENCOUNTER — LAB VISIT (OUTPATIENT)
Dept: LAB | Facility: HOSPITAL | Age: 84
End: 2024-07-22
Attending: NURSE PRACTITIONER
Payer: MEDICARE

## 2024-07-22 DIAGNOSIS — D47.2 MGUS (MONOCLONAL GAMMOPATHY OF UNKNOWN SIGNIFICANCE): ICD-10-CM

## 2024-07-22 LAB
ALBUMIN SERPL BCP-MCNC: 3.9 G/DL (ref 3.5–5.2)
ALP SERPL-CCNC: 49 U/L (ref 55–135)
ALT SERPL W/O P-5'-P-CCNC: 13 U/L (ref 10–44)
ANION GAP SERPL CALC-SCNC: 9 MMOL/L (ref 8–16)
AST SERPL-CCNC: 18 U/L (ref 10–40)
B2 MICROGLOB SERPL-MCNC: 2.5 UG/ML (ref 0–2.5)
BASOPHILS # BLD AUTO: 0.03 K/UL (ref 0–0.2)
BASOPHILS NFR BLD: 0.6 % (ref 0–1.9)
BILIRUB SERPL-MCNC: 0.6 MG/DL (ref 0.1–1)
BUN SERPL-MCNC: 12 MG/DL (ref 8–23)
CALCIUM SERPL-MCNC: 10.1 MG/DL (ref 8.7–10.5)
CHLORIDE SERPL-SCNC: 107 MMOL/L (ref 95–110)
CO2 SERPL-SCNC: 26 MMOL/L (ref 23–29)
CREAT SERPL-MCNC: 1.3 MG/DL (ref 0.5–1.4)
DIFFERENTIAL METHOD BLD: ABNORMAL
EOSINOPHIL # BLD AUTO: 0.1 K/UL (ref 0–0.5)
EOSINOPHIL NFR BLD: 1.9 % (ref 0–8)
ERYTHROCYTE [DISTWIDTH] IN BLOOD BY AUTOMATED COUNT: 12.1 % (ref 11.5–14.5)
EST. GFR  (NO RACE VARIABLE): 55 ML/MIN/1.73 M^2
GLUCOSE SERPL-MCNC: 103 MG/DL (ref 70–110)
HCT VFR BLD AUTO: 35.2 % (ref 40–54)
HGB BLD-MCNC: 11.8 G/DL (ref 14–18)
IGA SERPL-MCNC: 145 MG/DL (ref 40–350)
IGG SERPL-MCNC: 2189 MG/DL (ref 650–1600)
IGM SERPL-MCNC: 34 MG/DL (ref 50–300)
IMM GRANULOCYTES # BLD AUTO: 0.04 K/UL (ref 0–0.04)
IMM GRANULOCYTES NFR BLD AUTO: 0.8 % (ref 0–0.5)
LDH SERPL L TO P-CCNC: 143 U/L (ref 110–260)
LYMPHOCYTES # BLD AUTO: 1.6 K/UL (ref 1–4.8)
LYMPHOCYTES NFR BLD: 34 % (ref 18–48)
MCH RBC QN AUTO: 31 PG (ref 27–31)
MCHC RBC AUTO-ENTMCNC: 33.5 G/DL (ref 32–36)
MCV RBC AUTO: 92 FL (ref 82–98)
MONOCYTES # BLD AUTO: 0.4 K/UL (ref 0.3–1)
MONOCYTES NFR BLD: 7.5 % (ref 4–15)
NEUTROPHILS # BLD AUTO: 2.7 K/UL (ref 1.8–7.7)
NEUTROPHILS NFR BLD: 55.2 % (ref 38–73)
NRBC BLD-RTO: 0 /100 WBC
PLATELET # BLD AUTO: 231 K/UL (ref 150–450)
PMV BLD AUTO: 9.3 FL (ref 9.2–12.9)
POTASSIUM SERPL-SCNC: 3.9 MMOL/L (ref 3.5–5.1)
PROT SERPL-MCNC: 8.3 G/DL (ref 6–8.4)
RBC # BLD AUTO: 3.81 M/UL (ref 4.6–6.2)
SODIUM SERPL-SCNC: 142 MMOL/L (ref 136–145)
WBC # BLD AUTO: 4.82 K/UL (ref 3.9–12.7)

## 2024-07-22 PROCEDURE — 86334 IMMUNOFIX E-PHORESIS SERUM: CPT | Performed by: NURSE PRACTITIONER

## 2024-07-22 PROCEDURE — 86334 IMMUNOFIX E-PHORESIS SERUM: CPT | Mod: 26,,, | Performed by: PATHOLOGY

## 2024-07-22 PROCEDURE — 80053 COMPREHEN METABOLIC PANEL: CPT | Performed by: NURSE PRACTITIONER

## 2024-07-22 PROCEDURE — 83615 LACTATE (LD) (LDH) ENZYME: CPT | Performed by: NURSE PRACTITIONER

## 2024-07-22 PROCEDURE — 82784 ASSAY IGA/IGD/IGG/IGM EACH: CPT | Mod: 59 | Performed by: NURSE PRACTITIONER

## 2024-07-22 PROCEDURE — 82232 ASSAY OF BETA-2 PROTEIN: CPT | Performed by: NURSE PRACTITIONER

## 2024-07-22 PROCEDURE — 84165 PROTEIN E-PHORESIS SERUM: CPT | Mod: 26,,, | Performed by: PATHOLOGY

## 2024-07-22 PROCEDURE — 84165 PROTEIN E-PHORESIS SERUM: CPT | Performed by: NURSE PRACTITIONER

## 2024-07-22 PROCEDURE — 36415 COLL VENOUS BLD VENIPUNCTURE: CPT | Mod: PO | Performed by: NURSE PRACTITIONER

## 2024-07-22 PROCEDURE — 83521 IG LIGHT CHAINS FREE EACH: CPT | Performed by: NURSE PRACTITIONER

## 2024-07-22 PROCEDURE — 85025 COMPLETE CBC W/AUTO DIFF WBC: CPT | Performed by: NURSE PRACTITIONER

## 2024-07-23 LAB
ALBUMIN SERPL ELPH-MCNC: 4.15 G/DL (ref 3.35–5.55)
ALPHA1 GLOB SERPL ELPH-MCNC: 0.45 G/DL (ref 0.17–0.41)
ALPHA2 GLOB SERPL ELPH-MCNC: 0.8 G/DL (ref 0.43–0.99)
B-GLOBULIN SERPL ELPH-MCNC: 0.72 G/DL (ref 0.5–1.1)
GAMMA GLOB SERPL ELPH-MCNC: 1.79 G/DL (ref 0.67–1.58)
INTERPRETATION SERPL IFE-IMP: NORMAL
KAPPA LC SER QL IA: 2.32 MG/DL (ref 0.33–1.94)
KAPPA LC/LAMBDA SER IA: 0.47 (ref 0.26–1.65)
LAMBDA LC SER QL IA: 4.93 MG/DL (ref 0.57–2.63)
PATHOLOGIST INTERPRETATION IFE: NORMAL
PATHOLOGIST INTERPRETATION SPE: NORMAL
PROT SERPL-MCNC: 7.9 G/DL (ref 6–8.4)

## 2024-07-25 ENCOUNTER — OFFICE VISIT (OUTPATIENT)
Dept: HEMATOLOGY/ONCOLOGY | Facility: CLINIC | Age: 84
End: 2024-07-25
Payer: MEDICARE

## 2024-07-25 VITALS
TEMPERATURE: 98 F | SYSTOLIC BLOOD PRESSURE: 160 MMHG | DIASTOLIC BLOOD PRESSURE: 84 MMHG | OXYGEN SATURATION: 99 % | HEART RATE: 67 BPM

## 2024-07-25 DIAGNOSIS — D47.2 SMOLDERING MYELOMA: ICD-10-CM

## 2024-07-25 DIAGNOSIS — D53.9 NUTRITIONAL ANEMIA, UNSPECIFIED: ICD-10-CM

## 2024-07-25 DIAGNOSIS — Z85.46 HISTORY OF PROSTATE CANCER: ICD-10-CM

## 2024-07-25 DIAGNOSIS — D47.2 MGUS (MONOCLONAL GAMMOPATHY OF UNKNOWN SIGNIFICANCE): Primary | ICD-10-CM

## 2024-07-25 DIAGNOSIS — D64.9 ANEMIA, UNSPECIFIED TYPE: ICD-10-CM

## 2024-07-25 DIAGNOSIS — N18.31 CHRONIC KIDNEY DISEASE, STAGE 3A: ICD-10-CM

## 2024-07-25 PROCEDURE — 3288F FALL RISK ASSESSMENT DOCD: CPT | Mod: CPTII,S$GLB,, | Performed by: NURSE PRACTITIONER

## 2024-07-25 PROCEDURE — 1126F AMNT PAIN NOTED NONE PRSNT: CPT | Mod: CPTII,S$GLB,, | Performed by: NURSE PRACTITIONER

## 2024-07-25 PROCEDURE — 99999 PR PBB SHADOW E&M-EST. PATIENT-LVL III: CPT | Mod: PBBFAC,,, | Performed by: NURSE PRACTITIONER

## 2024-07-25 PROCEDURE — 3079F DIAST BP 80-89 MM HG: CPT | Mod: CPTII,S$GLB,, | Performed by: NURSE PRACTITIONER

## 2024-07-25 PROCEDURE — 1159F MED LIST DOCD IN RCRD: CPT | Mod: CPTII,S$GLB,, | Performed by: NURSE PRACTITIONER

## 2024-07-25 PROCEDURE — 99214 OFFICE O/P EST MOD 30 MIN: CPT | Mod: S$GLB,,, | Performed by: NURSE PRACTITIONER

## 2024-07-25 PROCEDURE — 1160F RVW MEDS BY RX/DR IN RCRD: CPT | Mod: CPTII,S$GLB,, | Performed by: NURSE PRACTITIONER

## 2024-07-25 PROCEDURE — 3077F SYST BP >= 140 MM HG: CPT | Mod: CPTII,S$GLB,, | Performed by: NURSE PRACTITIONER

## 2024-07-25 PROCEDURE — 1101F PT FALLS ASSESS-DOCD LE1/YR: CPT | Mod: CPTII,S$GLB,, | Performed by: NURSE PRACTITIONER

## 2024-07-25 PROCEDURE — 1157F ADVNC CARE PLAN IN RCRD: CPT | Mod: CPTII,S$GLB,, | Performed by: NURSE PRACTITIONER

## 2024-07-25 NOTE — ASSESSMENT & PLAN NOTE
Laboratory review stable. No CRAB criteria to suggest progression. No B symptoms. Mild anemia unchanged from baseline 11-12 range. No other cytopenias    Labs only 6 months with CBC, CMP, POLLY, FLC, Quantitative Immunoglobulins, Beta 2 labs, SPEP, B12, folate, iron. F/u 1 year with repeat labs 1 week prior. Discussed S&S to report sooner

## 2024-07-25 NOTE — PROGRESS NOTES
Subjective:       Patient ID: Curtis Landry is a 83 y.o. male.    Chief Complaint: Review labs. H/o MGUS    HPI: 83 y.o male presenting today for follow up of his previously diagnosed MGUS, chronic anemia. This a former patient of Dr. La who has a hx of chronic anemia and a MGUS of many years duration. He had a bone marrow in  that failed to show myeloma or other abnormalities. Of note he has a hx of prostate cancer s/p prostatectomy 2001.    He feels well and is without complaints. He denies any anemia symptoms or B symptoms. Reports doing well since most recent visit. He presents accompanied by a family member. Denies interval clinical concerns.   Social History     Socioeconomic History    Marital status:     Number of children: 1    Highest education level: 8th grade   Tobacco Use    Smoking status: Former     Current packs/day: 0.00     Average packs/day: 0.1 packs/day for 10.0 years (1.0 ttl pk-yrs)     Types: Cigarettes     Start date: 1970     Quit date: 1980     Years since quittin.5    Smokeless tobacco: Never   Substance and Sexual Activity    Alcohol use: No    Drug use: No    Sexual activity: Not Currently     Partners: Female     Social Determinants of Health     Financial Resource Strain: Medium Risk (2023)    Overall Financial Resource Strain (CARDIA)     Difficulty of Paying Living Expenses: Somewhat hard   Food Insecurity: No Food Insecurity (2023)    Hunger Vital Sign     Worried About Running Out of Food in the Last Year: Never true     Ran Out of Food in the Last Year: Never true   Transportation Needs: No Transportation Needs (2023)    PRAPARE - Transportation     Lack of Transportation (Medical): No     Lack of Transportation (Non-Medical): No   Physical Activity: Unknown (2023)    Exercise Vital Sign     Days of Exercise per Week: Patient declined   Stress: Stress Concern Present (2023)    [employer] - Occupational Stress Questionnaire     Feeling of Stress : To some extent   Housing Stability: Unknown (12/26/2023)    Housing Stability Vital Sign     Unable to Pay for Housing in the Last Year: No     Unstable Housing in the Last Year: No       Past Medical History:   Diagnosis Date    Anemia     Angina pectoris     Arthritis     CHF (congestive heart failure)     Glaucoma (increased eye pressure)     Followed by outside opthalmology    HTN (hypertension)     Hyperlipidemia     Macular degeneration     Pneumonia     did not require hospitalization    Prostate cancer     Prostate cancer     Situational mixed anxiety and depressive disorder 10/17/2023    Urinary incontinence        Family History   Problem Relation Name Age of Onset    Cancer Mother Luvenia         pancreas    Cancer Father DW     No Known Problems Daughter      Diabetes Neg Hx      Heart disease Neg Hx      Hyperlipidemia Neg Hx      Hypertension Neg Hx      Kidney disease Neg Hx      Stroke Neg Hx         Past Surgical History:   Procedure Laterality Date    APPENDECTOMY      CARDIAC DEFIBRILLATOR PLACEMENT      CARDIAC PACEMAKER PLACEMENT      CARDIAC PACEMAKER PLACEMENT      EYE SURGERY      GALLBLADDER SURGERY      PROSTATECTOMY      TOTAL HIP ARTHROPLASTY         Review of Systems   Constitutional:  Negative for activity change, appetite change, chills, fatigue, fever and unexpected weight change.   HENT:  Negative for congestion, mouth sores, nosebleeds, sore throat, trouble swallowing and voice change.    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   Respiratory:  Negative for cough, chest tightness, shortness of breath and wheezing.    Cardiovascular:  Negative for chest pain and leg swelling.   Gastrointestinal:  Negative for abdominal distention, abdominal pain, blood in stool, constipation, diarrhea, nausea and vomiting.   Genitourinary:  Negative for difficulty urinating, dysuria and hematuria.    Musculoskeletal:  Positive for gait problem (utilizes cane). Negative for arthralgias, back pain and myalgias.   Skin:  Negative for pallor, rash and wound.   Neurological:  Negative for dizziness, syncope, weakness and headaches.   Hematological:  Negative for adenopathy. Does not bruise/bleed easily.   Psychiatric/Behavioral:  The patient is not nervous/anxious.        Medication List with Changes/Refills   Current Medications    ACETAMINOPHEN (TYLENOL) 650 MG TBSR    Take 1 tablet (650 mg total) by mouth every 8 (eight) hours.    ASPIRIN (ECOTRIN) 81 MG EC TABLET    Take by mouth. 1 Tablet, Delayed Release (E.C.) Oral Every day    CARVEDILOL (COREG) 12.5 MG TABLET    TAKE 1 TABLET BY MOUTH 2 TIMES DAILY.    DORZOLAMIDE-TIMOLOL 2-0.5% (COSOPT) 22.3-6.8 MG/ML OPHTHALMIC SOLUTION    Place 1 drop into both eyes 2 (two) times daily.    FERROUS GLUCONATE (FERGON) 240 (27 FE) MG TABLET    Take 240 mg by mouth every other day.    LOSARTAN (COZAAR) 50 MG TABLET    TAKE 1 TABLET BY MOUTH EVERY DAY    SERTRALINE (ZOLOFT) 50 MG TABLET    Take 1 tablet (50 mg total) by mouth once daily.    TRAVOPROST (TRAVATAN Z) 0.004 % OPHTHALMIC SOLUTION    Inject into the eye. 1 Drops Ophthalmic At bedtime     Objective:     Vitals:    07/25/24 1028   BP: (!) 160/84   Pulse: 67   Temp: 97.6 °F (36.4 °C)       Lab Results   Component Value Date    WBC 4.82 07/22/2024    HGB 11.8 (L) 07/22/2024    HCT 35.2 (L) 07/22/2024    MCV 92 07/22/2024     07/22/2024       BMP  Lab Results   Component Value Date     07/22/2024    K 3.9 07/22/2024     07/22/2024    CO2 26 07/22/2024    BUN 12 07/22/2024    CREATININE 1.3 07/22/2024    CALCIUM 10.1 07/22/2024    ANIONGAP 9 07/22/2024    EGFRNORACEVR 55 (A) 07/22/2024     Lab Results   Component Value Date    ALT 13 07/22/2024    AST 18 07/22/2024    ALKPHOS 49 (L) 07/22/2024    BILITOT 0.6 07/22/2024     Lab Results   Component Value Date    UIBC 191 04/09/2012    IRON 79 10/02/2020     TRANSFERRIN 239 10/02/2020    TIBC 354 10/02/2020    FESATURATED 22 10/02/2020          Physical Exam  Pulmonary:      Effort: Pulmonary effort is normal. No respiratory distress.   Lymphadenopathy:      Head:      Right side of head: No preauricular or posterior auricular adenopathy.      Left side of head: No preauricular or posterior auricular adenopathy.   Neurological:      Mental Status: He is alert and oriented to person, place, and time.        Assessment:     Problem List Items Addressed This Visit          Oncology    MGUS (monoclonal gammopathy of unknown significance) - Primary     Laboratory review stable. No CRAB criteria to suggest progression. No B symptoms. Mild anemia unchanged from baseline 11-12 range. No other cytopenias    Labs only 6 months with CBC, CMP, POLLY, FLC, Quantitative Immunoglobulins, Beta 2 labs, SPEP, B12, folate, iron. F/u 1 year with repeat labs 1 week prior. Discussed S&S to report sooner             Relevant Orders    CBC Auto Differential    Beta 2 Microglobulin, Serum    Comprehensive Metabolic Panel    Immunofixation Electrophoresis    Immunoglobulin Free LT Chains Blood    Immunoglobulins (IgG, IgA, IgM) Quantitative    Lactate Dehydrogenase    Protein Electrophoresis, Serum    Ferritin    Iron and TIBC    Folate    Vitamin B12    Comprehensive Metabolic Panel    CBC Auto Differential    Beta 2 Microglobulin, Serum    Immunoglobulin Free LT Chains Blood    Immunoglobulins (IgG, IgA, IgM) Quantitative    Lactate Dehydrogenase    Protein Electrophoresis, Serum    Immunofixation Electrophoresis    History of prostate cancer     Continue Urology follow up          Other Visit Diagnoses       Smoldering myeloma        Relevant Orders    CBC Auto Differential    Beta 2 Microglobulin, Serum    Comprehensive Metabolic Panel    Immunofixation Electrophoresis    Immunoglobulin Free LT Chains Blood    Immunoglobulins (IgG, IgA, IgM) Quantitative    Lactate Dehydrogenase    Protein  Electrophoresis, Serum    Ferritin    Iron and TIBC    Folate    Vitamin B12    Comprehensive Metabolic Panel    CBC Auto Differential    Beta 2 Microglobulin, Serum    Immunoglobulin Free LT Chains Blood    Immunoglobulins (IgG, IgA, IgM) Quantitative    Lactate Dehydrogenase    Protein Electrophoresis, Serum    Immunofixation Electrophoresis    Anemia, unspecified type        Relevant Orders    CBC Auto Differential    Beta 2 Microglobulin, Serum    Comprehensive Metabolic Panel    Immunofixation Electrophoresis    Immunoglobulin Free LT Chains Blood    Immunoglobulins (IgG, IgA, IgM) Quantitative    Lactate Dehydrogenase    Protein Electrophoresis, Serum    Ferritin    Iron and TIBC    Folate    Vitamin B12    Comprehensive Metabolic Panel    CBC Auto Differential    Beta 2 Microglobulin, Serum    Immunoglobulin Free LT Chains Blood    Immunoglobulins (IgG, IgA, IgM) Quantitative    Lactate Dehydrogenase    Protein Electrophoresis, Serum    Immunofixation Electrophoresis    Nutritional anemia, unspecified        Relevant Orders    Folate    Vitamin B12          Route Chart for Scheduling    Med Onc Chart Routing      Follow up with physician    Follow up with [facility]LL[facility] 1 year.   Infusion scheduling note    Injection scheduling note    Labs Beta 2 microglobulin, CBC, CMP, free light chains, ferritin, iron and TIBC, vitamin B12, folate, SPEP, other and LDH   Scheduling:  Preferred lab:  Lab interval:  repeat MGUS  + b12/folate/iron labs only in 6 months. f/u 1 year with MGUS labs 1 week prior   Imaging None      Pharmacy appointment No pharmacy appointment needed      Other referrals       No additional referrals needed              Plan:     MGUS (monoclonal gammopathy of unknown significance)  -     CBC Auto Differential; Future; Expected date: 07/25/2024  -     Beta 2 Microglobulin, Serum; Future; Expected date: 07/25/2024  -     Comprehensive Metabolic Panel; Future; Expected date: 07/25/2024  -     Immunofixation  Electrophoresis; Future; Expected date: 07/25/2024  -     Immunoglobulin Free LT Chains Blood; Future; Expected date: 07/25/2024  -     Immunoglobulins (IgG, IgA, IgM) Quantitative; Future; Expected date: 07/25/2024  -     Lactate Dehydrogenase; Future; Expected date: 07/25/2024  -     Protein Electrophoresis, Serum; Future; Expected date: 07/25/2024  -     Ferritin; Future; Expected date: 07/25/2024  -     Iron and TIBC; Future; Expected date: 07/25/2024  -     Folate; Future; Expected date: 07/25/2024  -     Vitamin B12; Future; Expected date: 07/25/2024  -     Comprehensive Metabolic Panel; Future; Expected date: 07/25/2024  -     CBC Auto Differential; Future; Expected date: 07/25/2024  -     Beta 2 Microglobulin, Serum; Future; Expected date: 07/25/2024  -     Immunoglobulin Free LT Chains Blood; Future; Expected date: 07/25/2024  -     Immunoglobulins (IgG, IgA, IgM) Quantitative; Future; Expected date: 07/25/2024  -     Lactate Dehydrogenase; Future; Expected date: 07/25/2024  -     Protein Electrophoresis, Serum; Future; Expected date: 07/25/2024  -     Immunofixation Electrophoresis; Future; Expected date: 07/25/2024    Smoldering myeloma  -     CBC Auto Differential; Future; Expected date: 07/25/2024  -     Beta 2 Microglobulin, Serum; Future; Expected date: 07/25/2024  -     Comprehensive Metabolic Panel; Future; Expected date: 07/25/2024  -     Immunofixation Electrophoresis; Future; Expected date: 07/25/2024  -     Immunoglobulin Free LT Chains Blood; Future; Expected date: 07/25/2024  -     Immunoglobulins (IgG, IgA, IgM) Quantitative; Future; Expected date: 07/25/2024  -     Lactate Dehydrogenase; Future; Expected date: 07/25/2024  -     Protein Electrophoresis, Serum; Future; Expected date: 07/25/2024  -     Ferritin; Future; Expected date: 07/25/2024  -     Iron and TIBC; Future; Expected date: 07/25/2024  -     Folate; Future; Expected date: 07/25/2024  -     Vitamin B12; Future; Expected date:  07/25/2024  -     Comprehensive Metabolic Panel; Future; Expected date: 07/25/2024  -     CBC Auto Differential; Future; Expected date: 07/25/2024  -     Beta 2 Microglobulin, Serum; Future; Expected date: 07/25/2024  -     Immunoglobulin Free LT Chains Blood; Future; Expected date: 07/25/2024  -     Immunoglobulins (IgG, IgA, IgM) Quantitative; Future; Expected date: 07/25/2024  -     Lactate Dehydrogenase; Future; Expected date: 07/25/2024  -     Protein Electrophoresis, Serum; Future; Expected date: 07/25/2024  -     Immunofixation Electrophoresis; Future; Expected date: 07/25/2024    Anemia, unspecified type  -     CBC Auto Differential; Future; Expected date: 07/25/2024  -     Beta 2 Microglobulin, Serum; Future; Expected date: 07/25/2024  -     Comprehensive Metabolic Panel; Future; Expected date: 07/25/2024  -     Immunofixation Electrophoresis; Future; Expected date: 07/25/2024  -     Immunoglobulin Free LT Chains Blood; Future; Expected date: 07/25/2024  -     Immunoglobulins (IgG, IgA, IgM) Quantitative; Future; Expected date: 07/25/2024  -     Lactate Dehydrogenase; Future; Expected date: 07/25/2024  -     Protein Electrophoresis, Serum; Future; Expected date: 07/25/2024  -     Ferritin; Future; Expected date: 07/25/2024  -     Iron and TIBC; Future; Expected date: 07/25/2024  -     Folate; Future; Expected date: 07/25/2024  -     Vitamin B12; Future; Expected date: 07/25/2024  -     Comprehensive Metabolic Panel; Future; Expected date: 07/25/2024  -     CBC Auto Differential; Future; Expected date: 07/25/2024  -     Beta 2 Microglobulin, Serum; Future; Expected date: 07/25/2024  -     Immunoglobulin Free LT Chains Blood; Future; Expected date: 07/25/2024  -     Immunoglobulins (IgG, IgA, IgM) Quantitative; Future; Expected date: 07/25/2024  -     Lactate Dehydrogenase; Future; Expected date: 07/25/2024  -     Protein Electrophoresis, Serum; Future; Expected date: 07/25/2024  -     Immunofixation  Electrophoresis; Future; Expected date: 07/25/2024    Nutritional anemia, unspecified  -     Folate; Future; Expected date: 07/25/2024  -     Vitamin B12; Future; Expected date: 07/25/2024    History of prostate cancer          MOUNA Finnegan-C

## 2024-08-22 NOTE — PROGRESS NOTES
Subjective:       Patient ID: Curtis Landry is a 83 y.o. male.    Chief Complaint: Review labs. H/o MGUS    The patient location is: home  The chief complaint leading to consultation is: MGUS    Visit type: audiovisual    Face to Face time with patient: 10 minutes  25 minutes of total time spent on the encounter, which includes face to face time and non-face to face time preparing to see the patient (eg, review of tests), Obtaining and/or reviewing separately obtained history, Documenting clinical information in the electronic or other health record, Independently interpreting results (not separately reported) and communicating results to the patient/family/caregiver, or Care coordination (not separately reported).         Each patient to whom he or she provides medical services by telemedicine is:  (1) informed of the relationship between the physician and patient and the respective role of any other health care provider with respect to management of the patient; and (2) notified that he or she may decline to receive medical services by telemedicine and may withdraw from such care at any time.    Notes:     HPI: 83 y.o male presenting today for follow up of his previously diagnosed MGUS, chronic anemia. This a former patient of Dr. La who has a hx of chronic anemia and a MGUS of many years drartion. He had a bone marrow in 2014 that failed to show myeloma or other abnormalities. Of note he has a hx of prostate cancer s/p prostatectomy Jan 2001.    He feels well and is without complaints. He denies any anemia or B symptoms. Reports doing well since most recent visit. He presents accompanied by a family member.  Social History     Socioeconomic History    Marital status:     Number of children: 1    Highest education level: 8th grade   Tobacco Use    Smoking status: Former     Current packs/day: 0.00     Average packs/day: 0.1 packs/day for 10.0 years (1.0 ttl pk-yrs)     Types: Cigarettes     Start  Subjective:      Patient ID: Destini Augustine is a 89 y.o. female.    Chief Complaint: Annual Exam    HPI annual physical  Also in 4th week diarrhea 3-7 loose stools/dayno abd pain fever. No wt loss nv. W/u thusfar neg  Past Medical History:   Diagnosis Date    Arthritis     knees, hands    Atherosclerosis of aorta 2016    X-ray lumbar spine 2006    Atrial fibrillation     Atrial fibrillation, permanent 2017    Basal cell carcinoma     face, right thigh    Chronic diastolic heart failure 2023    COAG (chronic open-angle glaucoma) - Both Eyes 2013    COPD exacerbation 2023    Ex-smoker     [place]     mandible    Macular degeneration     Nonrheumatic mitral valve regurgitation 2022    Nonrheumatic tricuspid valve regurgitation 2022    Osteopenia      shama     Pulmonary heart disease     Pulmonary hypertension 2023    RVF (right ventricular failure) 2024    Scapholunate ligament injury with no instability     Scoliosis 2015    lumbar x-ray      Past Surgical History:   Procedure Laterality Date    APPENDECTOMY      CATARACT EXTRACTION Bilateral     DR. Singleton    hysterectomy  1970s    complete    HYSTERECTOMY      about 39yrs old, partial    MOUTH SURGERY Bilateral 02/10/2020    rectocele and cystocele  1998 approx    TONSILLECTOMY, ADENOIDECTOMY  5 y/o      Social History     Socioeconomic History    Marital status:     Number of children: 2   Tobacco Use    Smoking status: Former     Current packs/day: 0.00     Average packs/day: 1.5 packs/day for 30.0 years (45.0 ttl pk-yrs)     Types: Cigarettes     Start date: 1950     Quit date: 1980     Years since quittin.5    Smokeless tobacco: Former   Substance and Sexual Activity    Alcohol use: Not Currently    Drug use: No    Sexual activity: Never     Partners: Female   Social History Narrative    Lives alone, no pets or smokers in household.     Social Determinants of  Health     Financial Resource Strain: Low Risk  (4/22/2024)    Overall Financial Resource Strain (CARDIA)     Difficulty of Paying Living Expenses: Not hard at all   Food Insecurity: No Food Insecurity (4/22/2024)    Hunger Vital Sign     Worried About Running Out of Food in the Last Year: Never true     Ran Out of Food in the Last Year: Never true   Transportation Needs: No Transportation Needs (4/22/2024)    PRAPARE - Transportation     Lack of Transportation (Medical): No     Lack of Transportation (Non-Medical): No   Physical Activity: Sufficiently Active (4/22/2024)    Exercise Vital Sign     Days of Exercise per Week: 4 days     Minutes of Exercise per Session: 60 min   Stress: No Stress Concern Present (4/22/2024)    [employer] - Occupational Stress Questionnaire     Feeling of Stress : Not at all   Housing Stability: Low Risk  (4/22/2024)    Housing Stability Vital Sign     Unable to Pay for Housing in the Last Year: No     Homeless in the Last Year: No      Family History   Problem Relation Name Age of Onset    Cataracts Mother      Hypertension Mother      Stroke Mother      Cancer Father          colon    Thyroid disease Father      Cancer Brother          lung    Tuberculosis Brother      Blindness Neg Hx      Diabetes Neg Hx      Macular degeneration Neg Hx      Retinal detachment Neg Hx      Strabismus Neg Hx        Review of Systems        Objective:     Physical Exam  Vitals and nursing note reviewed.   Constitutional:       General: She is not in acute distress.     Appearance: She is well-developed.   HENT:      Head: Atraumatic.      Right Ear: External ear normal.      Left Ear: External ear normal.      Nose: Nose normal.      Mouth/Throat:      Pharynx: No oropharyngeal exudate.   Eyes:      General: No scleral icterus.     Conjunctiva/sclera: Conjunctivae normal.      Pupils: Pupils are equal, round, and reactive to light.   Neck:      Thyroid: No thyromegaly.  date: 1970     Quit date: 1980     Years since quittin.0    Smokeless tobacco: Never   Substance and Sexual Activity    Alcohol use: No    Drug use: No    Sexual activity: Not Currently     Partners: Female     Social Determinants of Health     Financial Resource Strain: Medium Risk (2023)    Overall Financial Resource Strain (CARDIA)     Difficulty of Paying Living Expenses: Somewhat hard   Food Insecurity: No Food Insecurity (2023)    Hunger Vital Sign     Worried About Running Out of Food in the Last Year: Never true     Ran Out of Food in the Last Year: Never true   Transportation Needs: No Transportation Needs (2023)    PRAPARE - Transportation     Lack of Transportation (Medical): No     Lack of Transportation (Non-Medical): No   Physical Activity: Unknown (2023)    Exercise Vital Sign     Days of Exercise per Week: Patient declined     Minutes of Exercise per Session: 20 min   Stress: Stress Concern Present (2023)    [employer] - Occupational Stress Questionnaire     Feeling of Stress : To some extent   Social Connections: Moderately Integrated (2023)    Social Connection and Isolation Panel [NHANES]     Frequency of Communication with Friends and Family: More than three times a week     Frequency of Social Gatherings with Friends and Family: Once a week     Attends [employer] Services: More than 4 times per year     Active Member of Clubs or Organizations: Yes     Attends Club or Organization Meetings: Never     Marital Status:    Housing Stability: Low Risk  (2023)    Housing Stability Vital Sign     Unable to Pay for Housing in the Last Year: No     Number of Places Lived in the Last Year: 1     Unstable Housing in the Last Year: No       Past Medical History:   Diagnosis Date    Anemia     Angina pectoris     Arthritis     CHF (congestive heart failure)     Glaucoma (increased eye pressure)     Followed by outside    Cardiovascular:      Rate and Rhythm: Normal rate and regular rhythm.      Heart sounds: Normal heart sounds. No murmur heard.  Pulmonary:      Effort: Pulmonary effort is normal. No respiratory distress.      Breath sounds: Normal breath sounds. No wheezing or rales.   Abdominal:      General: Bowel sounds are normal. There is no distension.      Palpations: Abdomen is soft. There is no mass.      Tenderness: There is no abdominal tenderness. There is no guarding or rebound.   Musculoskeletal:         General: No tenderness. Normal range of motion.      Cervical back: Normal range of motion and neck supple.   Lymphadenopathy:      Cervical: No cervical adenopathy.   Skin:     General: Skin is warm.      Coloration: Skin is not pale.      Findings: No erythema or rash.   Neurological:      Mental Status: She is alert and oriented to person, place, and time.      Cranial Nerves: No cranial nerve deficit.      Motor: No abnormal muscle tone.      Coordination: Coordination normal.   Psychiatric:         Behavior: Behavior normal.         Thought Content: Thought content normal.         Judgment: Judgment normal.       Assessment:         ICD-10-CM ICD-9-CM   1. Routine health maintenance  Z00.00 V70.0   2. Diarrhea, unspecified type  R19.7 787.91      Plan:        1. Routine health maintenance    2. Diarrhea, unspecified type  -     E-Consult to Gastroenterology     Further eval per above    F/u 6months  benifiber       opthalmology    HTN (hypertension)     Hyperlipidemia     Macular degeneration     Pneumonia     did not require hospitalization    Prostate cancer     Prostate cancer     Situational mixed anxiety and depressive disorder 10/17/2023    Urinary incontinence        Family History   Problem Relation Age of Onset    Cancer Mother         pancreas    Cancer Father     No Known Problems Daughter     Diabetes Neg Hx     Heart disease Neg Hx     Hyperlipidemia Neg Hx     Hypertension Neg Hx     Kidney disease Neg Hx     Stroke Neg Hx        Past Surgical History:   Procedure Laterality Date    APPENDECTOMY      CARDIAC DEFIBRILLATOR PLACEMENT      CARDIAC PACEMAKER PLACEMENT      CARDIAC PACEMAKER PLACEMENT      EYE SURGERY      GALLBLADDER SURGERY      PROSTATECTOMY      TOTAL HIP ARTHROPLASTY         Review of Systems   Constitutional:  Negative for activity change, appetite change, chills, fatigue, fever and unexpected weight change.   HENT:  Negative for congestion, mouth sores, nosebleeds, sore throat, trouble swallowing and voice change.    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   Respiratory:  Negative for cough, chest tightness, shortness of breath and wheezing.    Cardiovascular:  Negative for chest pain and leg swelling.   Gastrointestinal:  Negative for abdominal distention, abdominal pain, blood in stool, constipation, diarrhea, nausea and vomiting.   Genitourinary:  Negative for difficulty urinating, dysuria and hematuria.   Musculoskeletal:  Positive for gait problem (utilizes cane). Negative for arthralgias, back pain and myalgias.   Skin:  Negative for pallor, rash and wound.   Neurological:  Negative for dizziness, syncope, weakness and headaches.   Hematological:  Negative for adenopathy. Does not bruise/bleed easily.   Psychiatric/Behavioral:  The patient is not nervous/anxious.        Medication List with Changes/Refills   Current Medications    ACETAMINOPHEN (TYLENOL) 650 MG  TBSR    Take 1 tablet (650 mg total) by mouth every 8 (eight) hours.    ASPIRIN (ECOTRIN) 81 MG EC TABLET    Take by mouth. 1 Tablet, Delayed Release (E.C.) Oral Every day    CARVEDILOL (COREG) 12.5 MG TABLET    Take 1 tablet (12.5 mg total) by mouth 2 (two) times daily.    DORZOLAMIDE-TIMOLOL 2-0.5% (COSOPT) 22.3-6.8 MG/ML OPHTHALMIC SOLUTION    Place 1 drop into both eyes 2 (two) times daily.    FERROUS GLUCONATE (FERGON) 240 (27 FE) MG TABLET    Take 240 mg by mouth every other day.    LOSARTAN (COZAAR) 50 MG TABLET    Take 1 tablet (50 mg total) by mouth once daily.    SERTRALINE (ZOLOFT) 50 MG TABLET    Take 1 tablet (50 mg total) by mouth once daily.    TRAVOPROST (TRAVATAN Z) 0.004 % OPHTHALMIC SOLUTION    Inject into the eye. 1 Drops Ophthalmic At bedtime     Objective:     There were no vitals filed for this visit.    Lab Results   Component Value Date    WBC 5.77 12/20/2023    HGB 11.9 (L) 12/20/2023    HCT 35.2 (L) 12/20/2023    MCV 91 12/20/2023     12/20/2023       BMP  Lab Results   Component Value Date     12/20/2023    K 4.0 12/20/2023     12/20/2023    CO2 25 12/20/2023    BUN 11 12/20/2023    CREATININE 1.4 12/20/2023    CALCIUM 10.1 12/20/2023    ANIONGAP 8 12/20/2023    EGFRNORACEVR 50 (A) 12/20/2023     Lab Results   Component Value Date    ALT 14 12/20/2023    AST 15 12/20/2023    ALKPHOS 47 (L) 12/20/2023    BILITOT 0.6 12/20/2023     Lab Results   Component Value Date    UIBC 191 04/09/2012    IRON 79 10/02/2020    TRANSFERRIN 239 10/02/2020    TIBC 354 10/02/2020    FESATURATED 22 10/02/2020          Physical Exam  Pulmonary:      Effort: Pulmonary effort is normal. No respiratory distress.   Neurological:      Mental Status: He is alert and oriented to person, place, and time.        Assessment:     Problem List Items Addressed This Visit          Oncology    MGUS (monoclonal gammopathy of unknown significance) - Primary     Laboratory review stable. No CRAB criteria to  suggest progression. No B symptoms. Mild anemia unchanged from baseline 11-12 range. No other cytopenias    F/u 6 months with CBC, CMP, POLLY, FLC, Quantitative Immunoglobulins, Beta 2 labs, SPEP 1 week prior. Discussed S&S to report sooner             History of prostate cancer     Continue Urology follow up          Route Chart for Scheduling    Med Onc Chart Routing      Follow up with physician 6 months.   Follow up with FIORELLA    Infusion scheduling note    Injection scheduling note    Labs CBC, CMP, free light chains, other, SPEP and beta 2 microglobulin   Scheduling:  Preferred lab:  Lab interval:  1 week prior   Imaging None      Pharmacy appointment No pharmacy appointment needed      Other referrals       No additional referrals needed              Plan:     MGUS (monoclonal gammopathy of unknown significance)    History of prostate cancer          Viktoria Richard, MARCIALP-C

## 2024-08-28 ENCOUNTER — OFFICE VISIT (OUTPATIENT)
Dept: CARDIOLOGY | Facility: CLINIC | Age: 84
End: 2024-08-28
Payer: MEDICARE

## 2024-08-28 ENCOUNTER — HOSPITAL ENCOUNTER (OUTPATIENT)
Dept: CARDIOLOGY | Facility: HOSPITAL | Age: 84
Discharge: HOME | End: 2024-08-28
Attending: NURSE PRACTITIONER
Payer: MEDICARE

## 2024-08-28 VITALS
WEIGHT: 199.75 LBS | HEIGHT: 66 IN | HEART RATE: 72 BPM | OXYGEN SATURATION: 97 % | DIASTOLIC BLOOD PRESSURE: 80 MMHG | SYSTOLIC BLOOD PRESSURE: 172 MMHG | BODY MASS INDEX: 32.1 KG/M2

## 2024-08-28 DIAGNOSIS — I42.8 NICM (NONISCHEMIC CARDIOMYOPATHY): ICD-10-CM

## 2024-08-28 DIAGNOSIS — E78.2 MIXED HYPERLIPIDEMIA: ICD-10-CM

## 2024-08-28 DIAGNOSIS — I50.9 CHF (NYHA CLASS III, ACC/AHA STAGE C): ICD-10-CM

## 2024-08-28 DIAGNOSIS — I49.3 FREQUENT PVCS: Chronic | ICD-10-CM

## 2024-08-28 DIAGNOSIS — Z95.810 AUTOMATIC IMPLANTABLE CARDIOVERTER-DEFIBRILLATOR IN SITU: ICD-10-CM

## 2024-08-28 DIAGNOSIS — I25.5 ISCHEMIC CARDIOMYOPATHY: ICD-10-CM

## 2024-08-28 DIAGNOSIS — Z95.810 ICD (IMPLANTABLE CARDIOVERTER-DEFIBRILLATOR) IN PLACE: Primary | ICD-10-CM

## 2024-08-28 DIAGNOSIS — Z95.810 ICD (IMPLANTABLE CARDIOVERTER-DEFIBRILLATOR) IN PLACE: ICD-10-CM

## 2024-08-28 DIAGNOSIS — N18.31 CHRONIC KIDNEY DISEASE, STAGE 3A: ICD-10-CM

## 2024-08-28 DIAGNOSIS — I50.9 CHF (NYHA CLASS III, ACC/AHA STAGE C): Primary | Chronic | ICD-10-CM

## 2024-08-28 PROCEDURE — 3288F FALL RISK ASSESSMENT DOCD: CPT | Mod: CPTII,S$GLB,, | Performed by: NURSE PRACTITIONER

## 2024-08-28 PROCEDURE — 99214 OFFICE O/P EST MOD 30 MIN: CPT | Mod: S$GLB,,, | Performed by: NURSE PRACTITIONER

## 2024-08-28 PROCEDURE — 1126F AMNT PAIN NOTED NONE PRSNT: CPT | Mod: CPTII,S$GLB,, | Performed by: NURSE PRACTITIONER

## 2024-08-28 PROCEDURE — 1157F ADVNC CARE PLAN IN RCRD: CPT | Mod: CPTII,S$GLB,, | Performed by: NURSE PRACTITIONER

## 2024-08-28 PROCEDURE — 1101F PT FALLS ASSESS-DOCD LE1/YR: CPT | Mod: CPTII,S$GLB,, | Performed by: NURSE PRACTITIONER

## 2024-08-28 PROCEDURE — 3077F SYST BP >= 140 MM HG: CPT | Mod: CPTII,S$GLB,, | Performed by: NURSE PRACTITIONER

## 2024-08-28 PROCEDURE — 99999 PR PBB SHADOW E&M-EST. PATIENT-LVL III: CPT | Mod: PBBFAC,,, | Performed by: NURSE PRACTITIONER

## 2024-08-28 PROCEDURE — 1159F MED LIST DOCD IN RCRD: CPT | Mod: CPTII,S$GLB,, | Performed by: NURSE PRACTITIONER

## 2024-08-28 PROCEDURE — 3079F DIAST BP 80-89 MM HG: CPT | Mod: CPTII,S$GLB,, | Performed by: NURSE PRACTITIONER

## 2024-08-28 NOTE — PROGRESS NOTES
Subjective:   Patient ID:  Curtis Landry is a 83 y.o. male who presents for evaluation of No chief complaint on file.        HPI   Primary Cardiologist: Formerly Dr Boyd    Cardiac Problems:  1. CHF, NYHA FC III, EF recovered to 50% on 6/2020  2. NICM  3. S/P DC ICD ([employer])  4. HTN  5. HLP  6 Bigeminy    Curtis Landry returns to CHF clinic for routine follow up.     Denies chest pain or anginal equivalents. No shortness of breath, CORBIN or palpitations. Denies orthopnea, PND or abdominal bloating. Reports regular walking without any issues lately. NO leg swelling or claudications. No recent falls, syncope or near syncopal events. Reports compliance with medications and dietary restrictions. NO CNS complaints to suggest TIA or CVA today. No signs of abnormal bleeding on ASA daily    Device check completed today in office, brief AFib episodes noted on interrogation. Given fall risk, low burden and anemia will continue to monitor for now and continue with BB and ASA daily.     Uses cane for fall prevention.   Has been doing well with sodium and fluid restrictions.   Denies any acute issues today in office.     9/29/2022 update    Curtis Landry returns for follow up with device check. Well functioning device without any arrhythmias or ICD firing. He is doing well without any cardiac complaints. 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     Denies chest pain or anginal equivalents. No shortness of breath, CORBIN or palpitations. Denies orthopnea, PND or abdominal bloating. Reports regular walking without any issues lately. NO leg swelling or claudications. No recent falls, syncope or near syncopal events. Reports compliance with medications and dietary restrictions. NO CNS complaints to suggest TIA or CVA today. No signs of abnormal bleeding on ASA daily.     3/29/2023 update  He returns today and states he is doing ok. He is having more shortness of breath issues with exertional activities.      He is doing exercises while sitting in the chair without any issues.     BP well controlled today.     No leg swelling on exam today.   ICD interrogation completed today- well functioning device.     NO chest pain or anginal equivalents.   No dizziness, syncope or near syncopal events.     Reports compliance with medications. Needs to be more compliant with dietary restrictions.  No signs of abnormal bleeding on ASA daily.       2/29/2024 update    Curtis Landry returns for follow up.    Recent echo reviewed- EF 45-50%, Grade I DD    Continues to endorse shortness of breath with any amount of exertional activities.     Denies chest pain or anginal equivalents. Denies orthopnea, PND or abdominal bloating. Reports regular walking without any issues lately. NO leg swelling or claudications. No recent falls, syncope or near syncopal events. Reports compliance with medications and dietary restrictions. NO CNS complaints to suggest TIA or CVA today. No signs of abnormal bleeding on ASA daily.       8/28/2024 update    Curtis Landry returns for 6 month follow up with device check.   Device check well functioning today in office.     Using cane for fall prevention with decreased vision.   Reports shortness of breath stable recently.     Reports compliance with medications and dietary restrictions.     Denies chest pain or anginal equivalents. No shortness of breath, CORBIN or palpitations. Denies orthopnea, PND or abdominal bloating. Reports regular walking without any issues lately. NO leg swelling or claudications. No recent falls, syncope or near syncopal events. Reports compliance with medications and dietary restrictions. NO CNS complaints to suggest TIA or CVA today. No signs of abnormal bleeding on ASA daily.     Past Medical History:   Diagnosis Date    Anemia     Angina pectoris     Arthritis     CHF (congestive heart failure)     Glaucoma (increased eye pressure)     Followed by outside opthalmology    HTN  (hypertension)     Hyperlipidemia     Macular degeneration     Pneumonia     did not require hospitalization    Prostate cancer     Prostate cancer     Situational mixed anxiety and depressive disorder 10/17/2023    Urinary incontinence        Past Surgical History:   Procedure Laterality Date    APPENDECTOMY      CARDIAC DEFIBRILLATOR PLACEMENT      CARDIAC PACEMAKER PLACEMENT      CARDIAC PACEMAKER PLACEMENT      EYE SURGERY      GALLBLADDER SURGERY      PROSTATECTOMY      TOTAL HIP ARTHROPLASTY         Social History     Tobacco Use    Smoking status: Former     Current packs/day: 0.00     Average packs/day: 0.1 packs/day for 10.0 years (1.0 ttl pk-yrs)     Types: Cigarettes     Start date: 1970     Quit date: 1980     Years since quittin.6    Smokeless tobacco: Never   Substance Use Topics    Alcohol use: No    Drug use: No       Family History   Problem Relation Name Age of Onset    Cancer Mother Luvenia         pancreas    Cancer Father DW     No Known Problems Daughter      Diabetes Neg Hx      Heart disease Neg Hx      Hyperlipidemia Neg Hx      Hypertension Neg Hx      Kidney disease Neg Hx      Stroke Neg Hx       Wt Readings from Last 3 Encounters:   24 90.6 kg (199 lb 11.8 oz)   24 87.8 kg (193 lb 7.3 oz)   24 88 kg (194 lb 0.1 oz)     Temp Readings from Last 3 Encounters:   24 97.6 °F (36.4 °C) (Temporal)   10/12/23 96.9 °F (36.1 °C) (Tympanic)   10/03/23 97.8 °F (36.6 °C) (Oral)     BP Readings from Last 3 Encounters:   24 (!) 172/80   24 (!) 160/84   24 96/62     Pulse Readings from Last 3 Encounters:   24 72   24 67   24 101     Current Outpatient Medications on File Prior to Visit   Medication Sig Dispense Refill    acetaminophen (TYLENOL) 650 MG TbSR Take 1 tablet (650 mg total) by mouth every 8 (eight) hours.  0    aspirin (ECOTRIN) 81 MG EC tablet Take by mouth. 1 Tablet, Delayed Release (E.C.) Oral Every day      carvediloL  (COREG) 12.5 MG tablet TAKE 1 TABLET BY MOUTH 2 TIMES DAILY. 180 tablet 3    dorzolamide-timolol 2-0.5% (COSOPT) 22.3-6.8 mg/mL ophthalmic solution Place 1 drop into both eyes 2 (two) times daily.  4    losartan (COZAAR) 50 MG tablet TAKE 1 TABLET BY MOUTH EVERY DAY 90 tablet 3    sertraline (ZOLOFT) 50 MG tablet Take 1 tablet (50 mg total) by mouth once daily. 90 tablet 3    travoprost (TRAVATAN Z) 0.004 % ophthalmic solution Inject into the eye. 1 Drops Ophthalmic At bedtime      ferrous gluconate (FERGON) 240 (27 FE) MG tablet Take 240 mg by mouth every other day. (Patient not taking: Reported on 8/28/2024)       No current facility-administered medications on file prior to visit.       Review of Systems   Constitutional: Positive for malaise/fatigue.   HENT:  Negative for hearing loss and hoarse voice.    Eyes:  Negative for blurred vision and visual disturbance.   Cardiovascular:  Positive for dyspnea on exertion. Negative for chest pain, claudication, irregular heartbeat, leg swelling, near-syncope, orthopnea, palpitations, paroxysmal nocturnal dyspnea and syncope.   Respiratory:  Negative for cough, hemoptysis, shortness of breath, sleep disturbances due to breathing, snoring and wheezing.    Endocrine: Negative for cold intolerance and heat intolerance.   Hematologic/Lymphatic: Bruises/bleeds easily.   Skin:  Negative for color change, dry skin and nail changes.   Musculoskeletal:  Positive for arthritis and back pain. Negative for joint pain and myalgias.   Gastrointestinal:  Negative for bloating, abdominal pain, constipation, nausea and vomiting.   Genitourinary:  Negative for dysuria, flank pain, hematuria and hesitancy.   Neurological:  Negative for headaches, light-headedness, loss of balance, numbness, paresthesias and weakness.   Psychiatric/Behavioral:  Negative for altered mental status.    Allergic/Immunologic: Negative for environmental allergies.     BP (!) 172/80 (BP Location: Left arm, Patient  "Position: Sitting, BP Method: Large (Manual))   Pulse 72   Ht 5' 6" (1.676 m)   Wt 90.6 kg (199 lb 11.8 oz)   SpO2 97%   BMI 32.24 kg/m²     Objective:   Physical Exam  Vitals and nursing note reviewed.   Constitutional:       General: He is not in acute distress.     Appearance: Normal appearance. He is well-developed. He is not ill-appearing.   HENT:      Head: Normocephalic and atraumatic.      Nose: Nose normal.      Mouth/Throat:      Mouth: Mucous membranes are moist.   Eyes:      Pupils: Pupils are equal, round, and reactive to light.   Neck:      Thyroid: No thyromegaly.      Vascular: No JVD.      Trachea: No tracheal deviation.   Cardiovascular:      Rate and Rhythm: Normal rate and regular rhythm.      Chest Wall: PMI is not displaced.      Pulses: Intact distal pulses.           Radial pulses are 2+ on the right side and 2+ on the left side.        Dorsalis pedis pulses are 2+ on the right side and 2+ on the left side.      Heart sounds: S1 normal and S2 normal. Heart sounds not distant. No murmur heard.     Comments: Left Chest ICD site in excellent repair  No recent firing.   Pulmonary:      Effort: Pulmonary effort is normal. No respiratory distress.      Breath sounds: Normal breath sounds and air entry. No decreased breath sounds, wheezing or rales.   Abdominal:      General: Bowel sounds are normal.      Palpations: Abdomen is soft.   Musculoskeletal:         General: No swelling. Normal range of motion.      Cervical back: Full passive range of motion without pain, normal range of motion and neck supple.      Right ankle: No swelling.      Left ankle: No swelling.   Skin:     General: Skin is warm and dry.      Capillary Refill: Capillary refill takes less than 2 seconds.      Nails: There is no clubbing.   Neurological:      General: No focal deficit present.      Mental Status: He is alert and oriented to person, place, and time.   Psychiatric:         Mood and Affect: Mood normal.         " Speech: Speech normal.         Behavior: Behavior normal.         Thought Content: Thought content normal.         Judgment: Judgment normal.         Lab Results   Component Value Date    CHOL 221 (H) 03/12/2024    CHOL 200 (H) 03/14/2023    CHOL 205 (H) 03/24/2022     Lab Results   Component Value Date    HDL 29 (L) 03/12/2024    HDL 34 (L) 03/14/2023    HDL 33 (L) 03/24/2022     Lab Results   Component Value Date    LDLCALC 138.0 03/12/2024    LDLCALC 130.8 03/14/2023    LDLCALC 132.6 03/24/2022     Lab Results   Component Value Date    TRIG 270 (H) 03/12/2024    TRIG 176 (H) 03/14/2023    TRIG 197 (H) 03/24/2022     Lab Results   Component Value Date    CHOLHDL 13.1 (L) 03/12/2024    CHOLHDL 17.0 (L) 03/14/2023    CHOLHDL 16.1 (L) 03/24/2022       Chemistry        Component Value Date/Time     07/22/2024 0942    K 3.9 07/22/2024 0942     07/22/2024 0942    CO2 26 07/22/2024 0942    BUN 12 07/22/2024 0942    CREATININE 1.3 07/22/2024 0942     07/22/2024 0942        Component Value Date/Time    CALCIUM 10.1 07/22/2024 0942    ALKPHOS 49 (L) 07/22/2024 0942    AST 18 07/22/2024 0942    ALT 13 07/22/2024 0942    BILITOT 0.6 07/22/2024 0942    ESTGFRAFRICA >60.0 03/24/2022 0743    EGFRNONAA 56.4 (A) 03/24/2022 0743          Lab Results   Component Value Date    TSH 1.051 03/12/2024     Lab Results   Component Value Date    INR 1.1 01/18/2021    INR 1.1 05/14/2016    INR 1.1 01/31/2013     Lab Results   Component Value Date    WBC 4.82 07/22/2024    HGB 11.8 (L) 07/22/2024    HCT 35.2 (L) 07/22/2024    MCV 92 07/22/2024     07/22/2024      1. TTE  Results for orders placed during the hospital encounter of 02/22/24    Echo    Interpretation Summary    Left Ventricle: The left ventricle is mildly dilated. Mildly increased wall thickness. There is concentric remodeling. Normal wall motion. Septal motion is consistent with pacing. There is mildly reduced systolic function with a visually estimated  ejection fraction of 45 - 50%. Grade I diastolic dysfunction.    Right Ventricle: Normal right ventricular cavity size. Wall thickness is normal. Right ventricle wall motion  is normal. Systolic function is normal. Pacemaker lead present in the ventricle.    Mitral Valve: There is mild to moderate regurgitation.    Tricuspid Valve: There is mild to moderate regurgitation.    Pulmonary Artery: The estimated pulmonary artery systolic pressure is 39 mmHg.    IVC/SVC: Intermediate venous pressure at 8 mmHg.      Assessment:      No diagnosis found.          Plan:   NICM (nonischemic cardiomyopathy)  -continue Coreg and Losartan  -Continue routine device interrogations every 6 months  -No recent ICD firing  -Continue DASH diet,2  Gm sodium restriction  -1500 ml fluid restriction  -Monitor BP/Hr at home  -Declines med adjustment today       Anemia in chronic illness  -Continue Iron supplement  -Follow up with Hem/Onc as scheduled    HTN  -Continue BB, ARB      RTC in 6 months with device check      CHF SYNOPSIS:    GDMT:  ACE/ARB/ARNI:  Losartan 50 mg daily  Beta Blocker: Coreg 12.5 mg BID  MRA:  none  SGLT2: none  Diuretic:  none      Nicole May, FNP-C [facility]

## 2024-09-16 ENCOUNTER — OFFICE VISIT (OUTPATIENT)
Dept: PODIATRY | Facility: CLINIC | Age: 84
End: 2024-09-16
Payer: MEDICARE

## 2024-09-16 ENCOUNTER — LAB VISIT (OUTPATIENT)
Dept: LAB | Facility: HOSPITAL | Age: 84
End: 2024-09-16
Attending: PHYSICIAN ASSISTANT
Payer: MEDICARE

## 2024-09-16 VITALS — WEIGHT: 199.75 LBS | BODY MASS INDEX: 32.1 KG/M2 | HEIGHT: 66 IN

## 2024-09-16 DIAGNOSIS — M79.675 PAIN OF LEFT GREAT TOE: ICD-10-CM

## 2024-09-16 DIAGNOSIS — L60.0 INGROWING LEFT GREAT TOENAIL: Primary | ICD-10-CM

## 2024-09-16 DIAGNOSIS — E78.2 MIXED HYPERLIPIDEMIA: ICD-10-CM

## 2024-09-16 LAB
ALBUMIN SERPL BCP-MCNC: 4 G/DL (ref 3.5–5.2)
ALP SERPL-CCNC: 45 U/L (ref 55–135)
ALT SERPL W/O P-5'-P-CCNC: 13 U/L (ref 10–44)
ANION GAP SERPL CALC-SCNC: 7 MMOL/L (ref 8–16)
AST SERPL-CCNC: 17 U/L (ref 10–40)
BILIRUB SERPL-MCNC: 0.6 MG/DL (ref 0.1–1)
BUN SERPL-MCNC: 18 MG/DL (ref 8–23)
CALCIUM SERPL-MCNC: 10.3 MG/DL (ref 8.7–10.5)
CHLORIDE SERPL-SCNC: 107 MMOL/L (ref 95–110)
CHOLEST SERPL-MCNC: 219 MG/DL (ref 120–199)
CHOLEST/HDLC SERPL: 7.1 {RATIO} (ref 2–5)
CO2 SERPL-SCNC: 25 MMOL/L (ref 23–29)
CREAT SERPL-MCNC: 1.7 MG/DL (ref 0.5–1.4)
EST. GFR  (NO RACE VARIABLE): 39.5 ML/MIN/1.73 M^2
GLUCOSE SERPL-MCNC: 98 MG/DL (ref 70–110)
HDLC SERPL-MCNC: 31 MG/DL (ref 40–75)
HDLC SERPL: 14.2 % (ref 20–50)
LDLC SERPL CALC-MCNC: 141.4 MG/DL (ref 63–159)
NONHDLC SERPL-MCNC: 188 MG/DL
POTASSIUM SERPL-SCNC: 4.4 MMOL/L (ref 3.5–5.1)
PROT SERPL-MCNC: 8.4 G/DL (ref 6–8.4)
SODIUM SERPL-SCNC: 139 MMOL/L (ref 136–145)
TRIGL SERPL-MCNC: 233 MG/DL (ref 30–150)

## 2024-09-16 PROCEDURE — 1160F RVW MEDS BY RX/DR IN RCRD: CPT | Mod: CPTII,S$GLB,, | Performed by: PODIATRIST

## 2024-09-16 PROCEDURE — 99999 PR PBB SHADOW E&M-EST. PATIENT-LVL III: CPT | Mod: PBBFAC,,, | Performed by: PODIATRIST

## 2024-09-16 PROCEDURE — 11730 AVULSION NAIL PLATE SIMPLE 1: CPT | Mod: TA,S$GLB,, | Performed by: PODIATRIST

## 2024-09-16 PROCEDURE — 1126F AMNT PAIN NOTED NONE PRSNT: CPT | Mod: CPTII,S$GLB,, | Performed by: PODIATRIST

## 2024-09-16 PROCEDURE — 1159F MED LIST DOCD IN RCRD: CPT | Mod: CPTII,S$GLB,, | Performed by: PODIATRIST

## 2024-09-16 PROCEDURE — 99203 OFFICE O/P NEW LOW 30 MIN: CPT | Mod: 25,S$GLB,, | Performed by: PODIATRIST

## 2024-09-16 PROCEDURE — 36415 COLL VENOUS BLD VENIPUNCTURE: CPT | Performed by: PHYSICIAN ASSISTANT

## 2024-09-16 PROCEDURE — 80053 COMPREHEN METABOLIC PANEL: CPT | Performed by: PHYSICIAN ASSISTANT

## 2024-09-16 PROCEDURE — 1101F PT FALLS ASSESS-DOCD LE1/YR: CPT | Mod: CPTII,S$GLB,, | Performed by: PODIATRIST

## 2024-09-16 PROCEDURE — 80061 LIPID PANEL: CPT | Performed by: PHYSICIAN ASSISTANT

## 2024-09-16 PROCEDURE — 3288F FALL RISK ASSESSMENT DOCD: CPT | Mod: CPTII,S$GLB,, | Performed by: PODIATRIST

## 2024-09-16 PROCEDURE — 1157F ADVNC CARE PLAN IN RCRD: CPT | Mod: CPTII,S$GLB,, | Performed by: PODIATRIST

## 2024-09-16 NOTE — PROGRESS NOTES
Subjective:       Patient ID: Curtis Lanrdy is a 83 y.o. male.    Chief Complaint: Toe Pain (Toe pain, rates pain 0, nondiabetic )      HPI: Curtis Landry presents to the office with complaints of pains to the left great  toe, due to ingrowing. States mild drainage. States swelling, redness and mild pains. Symptoms have been on going for several days and are worsening. States difficulties with walking as a result of pains. Walking and standing, particularly with shoe gear, exacerbates the ailment. Pains are sharp in nature and are rated at approx. 2/10. Patient's Primary Care Provider is Liz Hall MD.     Review of patient's allergies indicates:  No Known Allergies    Past Medical History:   Diagnosis Date    Anemia     Angina pectoris     Arthritis     CHF (congestive heart failure)     Glaucoma (increased eye pressure)     Followed by outside opthalmology    HTN (hypertension)     Hyperlipidemia     Macular degeneration     Pneumonia     did not require hospitalization    Prostate cancer     Prostate cancer     Situational mixed anxiety and depressive disorder 10/17/2023    Urinary incontinence        Family History   Problem Relation Name Age of Onset    Cancer Mother Luvenia         pancreas    Cancer Father DW     No Known Problems Daughter      Diabetes Neg Hx      Heart disease Neg Hx      Hyperlipidemia Neg Hx      Hypertension Neg Hx      Kidney disease Neg Hx      Stroke Neg Hx         Social History     Socioeconomic History    Marital status:     Number of children: 1    Highest education level: 8th grade   Tobacco Use    Smoking status: Former     Current packs/day: 0.00     Average packs/day: 0.1 packs/day for 10.0 years (1.0 ttl pk-yrs)     Types: Cigarettes     Start date: 1970     Quit date: 1980     Years since quittin.7    Smokeless tobacco: Never   Substance and Sexual Activity    Alcohol use: No    Drug use: No    Sexual activity: Not Currently     Partners:  Female     Social Determinants of Health     Financial Resource Strain: Medium Risk (12/26/2023)    Overall Financial Resource Strain (CARDIA)     Difficulty of Paying Living Expenses: Somewhat hard   Food Insecurity: No Food Insecurity (12/26/2023)    Hunger Vital Sign     Worried About Running Out of Food in the Last Year: Never true     Ran Out of Food in the Last Year: Never true   Transportation Needs: No Transportation Needs (12/26/2023)    PRAPARE - Transportation     Lack of Transportation (Medical): No     Lack of Transportation (Non-Medical): No   Physical Activity: Unknown (12/26/2023)    Exercise Vital Sign     Days of Exercise per Week: Patient declined   Stress: Stress Concern Present (12/26/2023)    [employer] [place] [employer] - Occupational Stress Questionnaire     Feeling of Stress : To some extent   Housing Stability: Unknown (12/26/2023)    Housing Stability Vital Sign     Unable to Pay for Housing in the Last Year: No     Unstable Housing in the Last Year: No       Past Surgical History:   Procedure Laterality Date    APPENDECTOMY      CARDIAC DEFIBRILLATOR PLACEMENT      CARDIAC PACEMAKER PLACEMENT      CARDIAC PACEMAKER PLACEMENT      EYE SURGERY      GALLBLADDER SURGERY      PROSTATECTOMY      TOTAL HIP ARTHROPLASTY         Review of Systems   Constitutional:  Negative for chills, fatigue and fever.   HENT:  Negative for hearing loss.    Eyes:  Negative for photophobia and visual disturbance.   Respiratory:  Negative for cough, chest tightness, shortness of breath and wheezing.    Cardiovascular:  Negative for chest pain and palpitations.   Gastrointestinal:  Negative for constipation, diarrhea, nausea and vomiting.   Endocrine: Negative for cold intolerance and heat intolerance.   Genitourinary:  Negative for flank pain.   Musculoskeletal:  Negative for neck pain and neck stiffness.   Neurological:  Negative for light-headedness and headaches.   Psychiatric/Behavioral:  Negative  "for sleep disturbance.          Objective:   Ht 5' 6" (1.676 m)   Wt 90.6 kg (199 lb 11.8 oz)   BMI 32.24 kg/m²     Physical Exam  LOWER EXTREMITY PHYSICAL EXAMINATION  VASCULAR: On the left foot, the dorsalis pedis pulse is 1/4 and the posterior tibial pulse is 1/4. Capillary refill time is less than 3 seconds. Hair growth is sparse on the dorsum of the foot and at the digits. Proximal to distal temperature is warm to warm. Mild edema is noted.    DERMATOLOGY: Ingrowing of the left foot lateral nail border of the great toe. The nail is incurvated into the skin of the affected border, causing pains, which are moderate in nature. There is mild edema. There is no cellulitis noted. There is no drainage. No fluctuance. Granuloma formation is absent.    ORTHOPEDIC: Manual Muscle Testing is 5/5 in all planes on the left, without pains, with and without resistance. Gait pattern is slightly antalgic.    Assessment:     1. Ingrowing left great toenail    2. Pain of left great toe        Plan:     Ingrowing left great toenail    Pain of left great toe        A TIME-OUT was completed. Oral, written and verbal consent were obtained from patient, prior to procedure being performed as discussed below.    The left 1st toe was/were anesthetized with a total of 3cc-5cc of a mixture of Lidocaine w/ Epinephrine and Marcaine w/o Epinephrine.    The area was then prepped appropriately with betadine paint. Following this, a [place] Elevator was utilized to free up the offending nail border, from the surrounding soft tissues.  Next, an English Anvil was used to split the nail from distal to proximal. Once freed from the soft tissue structures at the of the offending nail fold, a Curved Hemostat was used to remove the offending portion of nail.  A curette was then utilized to remove and and all debris from the border of the nail. Antibiotic cream and a sterile bandage with Coban was placed on the digit.      Patient was informed of the " possibility of recurrence of an ingrowing nail. Patient was given written and oral instructions for care including the office phone number if any questions or concerns arise.      Patient to start daily application of topical ABx. ointment with a bandage.     Patient to follow up in approx. 10 to 14 days, if needed.                Future Appointments   Date Time Provider Department Center   9/19/2024 11:20 AM Liz Hall MD [facility] IM BR Medical C   10/8/2024 11:15 AM Daryl Guzman MD [facility] UROLOGY  Medical C   2/24/2025 11:30 AM Kath Marin FNP-C [facility] ARR BR Medical C   2/24/2025 11:30 AM PACEMAKER CLINIC, [facility] ARRHYTHMIA [facility] PRO  Medical C

## 2024-09-19 ENCOUNTER — TELEPHONE (OUTPATIENT)
Dept: INTERNAL MEDICINE | Facility: CLINIC | Age: 84
End: 2024-09-19

## 2024-09-19 ENCOUNTER — OFFICE VISIT (OUTPATIENT)
Dept: INTERNAL MEDICINE | Facility: CLINIC | Age: 84
End: 2024-09-19
Payer: MEDICARE

## 2024-09-19 ENCOUNTER — LAB VISIT (OUTPATIENT)
Dept: LAB | Facility: HOSPITAL | Age: 84
End: 2024-09-19
Attending: FAMILY MEDICINE
Payer: MEDICARE

## 2024-09-19 VITALS
RESPIRATION RATE: 18 BRPM | HEIGHT: 66 IN | DIASTOLIC BLOOD PRESSURE: 82 MMHG | HEART RATE: 41 BPM | WEIGHT: 198 LBS | SYSTOLIC BLOOD PRESSURE: 148 MMHG | BODY MASS INDEX: 31.82 KG/M2

## 2024-09-19 DIAGNOSIS — R79.89 ELEVATED SERUM CREATININE: ICD-10-CM

## 2024-09-19 DIAGNOSIS — E78.2 MIXED HYPERLIPIDEMIA: ICD-10-CM

## 2024-09-19 DIAGNOSIS — I10 ESSENTIAL HYPERTENSION: ICD-10-CM

## 2024-09-19 DIAGNOSIS — Z95.810 AUTOMATIC IMPLANTABLE CARDIOVERTER-DEFIBRILLATOR IN SITU: ICD-10-CM

## 2024-09-19 DIAGNOSIS — R00.1 BRADYCARDIA: Primary | ICD-10-CM

## 2024-09-19 DIAGNOSIS — I50.9 CHF (NYHA CLASS III, ACC/AHA STAGE C): Chronic | ICD-10-CM

## 2024-09-19 LAB
ANION GAP SERPL CALC-SCNC: 9 MMOL/L (ref 8–16)
BUN SERPL-MCNC: 11 MG/DL (ref 8–23)
CALCIUM SERPL-MCNC: 10.5 MG/DL (ref 8.7–10.5)
CHLORIDE SERPL-SCNC: 107 MMOL/L (ref 95–110)
CO2 SERPL-SCNC: 23 MMOL/L (ref 23–29)
CREAT SERPL-MCNC: 1.6 MG/DL (ref 0.5–1.4)
EST. GFR  (NO RACE VARIABLE): 42.5 ML/MIN/1.73 M^2
GLUCOSE SERPL-MCNC: 93 MG/DL (ref 70–110)
POTASSIUM SERPL-SCNC: 4.3 MMOL/L (ref 3.5–5.1)
SODIUM SERPL-SCNC: 139 MMOL/L (ref 136–145)

## 2024-09-19 PROCEDURE — G2211 COMPLEX E/M VISIT ADD ON: HCPCS | Mod: S$GLB,,, | Performed by: FAMILY MEDICINE

## 2024-09-19 PROCEDURE — 1126F AMNT PAIN NOTED NONE PRSNT: CPT | Mod: CPTII,S$GLB,, | Performed by: FAMILY MEDICINE

## 2024-09-19 PROCEDURE — 1160F RVW MEDS BY RX/DR IN RCRD: CPT | Mod: CPTII,S$GLB,, | Performed by: FAMILY MEDICINE

## 2024-09-19 PROCEDURE — 80048 BASIC METABOLIC PNL TOTAL CA: CPT | Performed by: FAMILY MEDICINE

## 2024-09-19 PROCEDURE — 3079F DIAST BP 80-89 MM HG: CPT | Mod: CPTII,S$GLB,, | Performed by: FAMILY MEDICINE

## 2024-09-19 PROCEDURE — 99214 OFFICE O/P EST MOD 30 MIN: CPT | Mod: S$GLB,,, | Performed by: FAMILY MEDICINE

## 2024-09-19 PROCEDURE — 3288F FALL RISK ASSESSMENT DOCD: CPT | Mod: CPTII,S$GLB,, | Performed by: FAMILY MEDICINE

## 2024-09-19 PROCEDURE — 1159F MED LIST DOCD IN RCRD: CPT | Mod: CPTII,S$GLB,, | Performed by: FAMILY MEDICINE

## 2024-09-19 PROCEDURE — 1157F ADVNC CARE PLAN IN RCRD: CPT | Mod: CPTII,S$GLB,, | Performed by: FAMILY MEDICINE

## 2024-09-19 PROCEDURE — 36415 COLL VENOUS BLD VENIPUNCTURE: CPT | Performed by: FAMILY MEDICINE

## 2024-09-19 PROCEDURE — 1101F PT FALLS ASSESS-DOCD LE1/YR: CPT | Mod: CPTII,S$GLB,, | Performed by: FAMILY MEDICINE

## 2024-09-19 PROCEDURE — 99999 PR PBB SHADOW E&M-EST. PATIENT-LVL III: CPT | Mod: PBBFAC,,, | Performed by: FAMILY MEDICINE

## 2024-09-19 PROCEDURE — 3077F SYST BP >= 140 MM HG: CPT | Mod: CPTII,S$GLB,, | Performed by: FAMILY MEDICINE

## 2024-09-19 RX ORDER — CARVEDILOL 12.5 MG/1
6.25 TABLET ORAL 2 TIMES DAILY
Start: 2024-09-19

## 2024-09-19 NOTE — TELEPHONE ENCOUNTER
----- Message from Marjorie Linn sent at 9/19/2024  4:48 PM CDT -----  Regarding: Consult/Advisory/Results  Contact: [phone]  Consult/Advisory/Results      Name Of Caller: Bela        Contact Preference:   [phone]     Nature of call: Would like to go over today's lab results

## 2024-09-19 NOTE — PATIENT INSTRUCTIONS
Check with your pharmacist regarding Tdap (tetanus/diphtheria/pertussis) vaccine.     Check with your pharmacist regarding RSV vaccine.      You are eligible for a covid booster which are available at most pharmacies.

## 2024-09-19 NOTE — PROGRESS NOTES
Subjective:       Patient ID: Curtis Landry is a 83 y.o. male.    Chief Complaint: Follow-up    History of Present Illness    Patient presents to clinic today for followup of chronic conditions. His cousin is present with him today. She reports daughter is primary caregiver.  - Reports palpitations associated with pacemaker, primarily when lying down  - Denies any palpitations today  - Cardiology visit with Kath Marin approximately 2 weeks ago showed a pulse rate in the 70s  - Denies feeling weak or dizzy  - Recent lab results indicate stable to slightly improved cholesterol levels  - Increase in creatinine from baseline  - Reports adequate fluid intake  - States that cardiologist has not imposed any fluid restrictions  Patient is otherwise without concerns today.      Review of Systems   Constitutional:  Negative for chills, fatigue, fever and unexpected weight change.   Eyes:  Negative for visual disturbance.   Respiratory:  Negative for shortness of breath.    Cardiovascular:  Positive for palpitations. Negative for chest pain.   Musculoskeletal:  Negative for myalgias.   Neurological:  Negative for dizziness, syncope, light-headedness and headaches.         Objective:      Physical Exam  Vitals reviewed.   Constitutional:       General: He is not in acute distress.     Appearance: He is well-developed.   HENT:      Head: Normocephalic and atraumatic.   Eyes:      General: Lids are normal. No scleral icterus.     Extraocular Movements: Extraocular movements intact.      Conjunctiva/sclera: Conjunctivae normal.      Pupils: Pupils are equal, round, and reactive to light.   Cardiovascular:      Rate and Rhythm: Regular rhythm. Bradycardia present.      Heart sounds: No murmur heard.     No friction rub. No gallop.   Pulmonary:      Effort: Pulmonary effort is normal.      Breath sounds: Normal breath sounds. No decreased breath sounds, wheezing, rhonchi or rales.   Neurological:      Mental Status: He is alert  and oriented to person, place, and time.      Cranial Nerves: No cranial nerve deficit.      Gait: Gait normal.   Psychiatric:         Mood and Affect: Mood and affect normal.         Assessment:       1. Bradycardia    2. Automatic implantable cardioverter-defibrillator in situ    3. CHF (NYHA class III, ACC/AHA stage C)    4. Elevated serum creatinine    5. Essential hypertension    6. Mixed hyperlipidemia        Plan:   1. Bradycardia    2. Automatic implantable cardioverter-defibrillator in situ  Overview:  Followed by Cardiology      3. CHF (NYHA class III, ACC/AHA stage C)  Overview:  Followed by Cardiology, continue current treatment plan       4. Elevated serum creatinine  -     Basic Metabolic Panel; Future; Expected date: 09/19/2024    5. Essential hypertension  -     carvediloL (COREG) 12.5 MG tablet; Take 0.5 tablets (6.25 mg total) by mouth 2 (two) times daily.  -     Comprehensive Metabolic Panel; Future; Expected date: 03/19/2025  -     Lipid Panel; Future; Expected date: 03/19/2025  -     TSH; Future; Expected date: 03/19/2025  -     CBC Auto Differential; Future; Expected date: 03/19/2025    6. Mixed hyperlipidemia  Assessment & Plan:  Stable    Lab Results   Component Value Date    CHOL 219 (H) 09/16/2024    LDLCALC 141.4 09/16/2024    TRIG 233 (H) 09/16/2024    HDL 31 (L) 09/16/2024    ALT 13 09/16/2024    AST 17 09/16/2024    ALKPHOS 45 (L) 09/16/2024             Assessment & Plan     Concerned about patient's low pulse rate, fluctuating in low to mid-40s   Noted recent increase in creatinine level, indicating decreased kidney function   Consulted with Cardiology FIORELLA Kath Marin regarding low pulse rate   Will decrease Coreg (carvedilol) dosage and set up heart monitoring     Ordered labs to recheck kidney function.     Decreased Coreg (carvedilol) from 12.5 mg to 6.25 mg twice daily.   Advised patient that cardiology team plans to set up Holter monitor for heart rate monitoring.   Advised the  patient to await contact from cardiology team regarding Holter monitor setup.   Advised to present to ER if symptomatic, including severe weakness, dizziness, or any unusual behavior.    Patient and family member expressed understanding and agreement with plan.    Visit today included increased complexity associated with the care of the episodic problem bradycardia, which was addressed while instituting co-management of the longitudinal care of the patient due to the serious and/or complex managed problem(s) .    I have evaluated and discussed management associated with medical care services that serve as the continuing focal point for all needed health care services and/or with medical care services that are part of ongoing care related to my patient's single, serious condition or a complex condition(s).    I am providing ongoing care and I am the primary care provider for this patient, and they are being managed, monitored, and/or observed for their chronic conditions over time.     I have addressed their ongoing health maintenance requirements and needs for all health care services and reviewed co-management plans provided by specialty providers when available.    Health Maintenance Due   Topic Date Due    TETANUS VACCINE  Never done    RSV Vaccine (Age 60+ and Pregnant patients) (1 - 1-dose 60+ series) Never done    COVID-19 Vaccine (4 - 2023-24 season) 09/01/2024    PROSTATE-SPECIFIC ANTIGEN  09/13/2024     Health Maintenance reviewed/updated.    Follow up in about 6 months (around 3/19/2025), or if symptoms worsen or fail to improve, for EPP/annual with Amaya JORGENSEN, labs today, labs PTA.    This note was generated with the assistance of ambient listening technology. Verbal consent was obtained by the patient and accompanying visitor(s) for the recording of patient appointment to facilitate this note. I attest to having reviewed and edited the generated note for accuracy, though some syntax or spelling errors  may persist. Please contact the author of this note for any clarification.

## 2024-09-19 NOTE — ASSESSMENT & PLAN NOTE
Stable    Lab Results   Component Value Date    CHOL 219 (H) 09/16/2024    LDLCALC 141.4 09/16/2024    TRIG 233 (H) 09/16/2024    HDL 31 (L) 09/16/2024    ALT 13 09/16/2024    AST 17 09/16/2024    ALKPHOS 45 (L) 09/16/2024

## 2024-09-20 ENCOUNTER — TELEPHONE (OUTPATIENT)
Dept: INTERNAL MEDICINE | Facility: CLINIC | Age: 84
End: 2024-09-20
Payer: MEDICARE

## 2024-09-20 NOTE — TELEPHONE ENCOUNTER
Called pt, she was asking about his blood work (lab results)   I told pt they weren't in yet or reviewed by .   Pt had clear understanding   I also told PT if the results come today I will call and let them know.

## 2024-09-20 NOTE — TELEPHONE ENCOUNTER
----- Message from Chanelapril Richard sent at 9/20/2024  3:03 PM CDT -----  Contact: Wife [phone]  [email or web address]     Patient is calling for Medical Advice regarding:    How long has patient had these symptoms:    Pharmacy name and phone#:    Patient wants a call back or thru [facility]:    Comments:Pt wife states that  this her second time leaving message for nurse are doctor.Pt wife would like a call back     Please advise patient replies from provider may take up to 48 hours.

## 2024-09-24 ENCOUNTER — HOSPITAL ENCOUNTER (OUTPATIENT)
Dept: CARDIOLOGY | Facility: HOSPITAL | Age: 84
Discharge: HOME | End: 2024-09-24
Attending: NURSE PRACTITIONER
Payer: MEDICARE

## 2024-09-24 ENCOUNTER — TELEPHONE (OUTPATIENT)
Dept: INTERNAL MEDICINE | Facility: CLINIC | Age: 84
End: 2024-09-24
Payer: MEDICARE

## 2024-09-24 DIAGNOSIS — I49.3 FREQUENT PVCS: Chronic | ICD-10-CM

## 2024-09-24 PROCEDURE — 93225 XTRNL ECG REC<48 HRS REC: CPT

## 2024-09-24 PROCEDURE — 93226 XTRNL ECG REC<48 HR SCAN A/R: CPT

## 2024-09-24 NOTE — TELEPHONE ENCOUNTER
Called pt, stated that results hadn't been noted to us yet.   I will give them a call back once the results come in.

## 2024-09-26 ENCOUNTER — TELEPHONE (OUTPATIENT)
Dept: INTERNAL MEDICINE | Facility: CLINIC | Age: 84
End: 2024-09-26
Payer: MEDICARE

## 2024-09-26 VITALS — DIASTOLIC BLOOD PRESSURE: 81 MMHG | SYSTOLIC BLOOD PRESSURE: 138 MMHG

## 2024-09-26 NOTE — TELEPHONE ENCOUNTER
Pt family called to report the patients bp reading  Pt had not taken his medication  Advised caller to take medication and recheck bp   in a couple of hours and call us back with the reading then  No acute distress noted from the patient at this time

## 2024-09-26 NOTE — TELEPHONE ENCOUNTER
----- Message from Poonam Sowmya sent at 9/26/2024 12:34 PM CDT -----  Contact: Bela/Cousin  Bela is calling to give you Curtis latest blood pressure reading: /81 12:30 PM. Please call back at [phone]. Please send this to nurse Felix.

## 2024-09-26 NOTE — TELEPHONE ENCOUNTER
Called pt regarding document of bp.    documented it down for her today, but she just wanted to make sure we had it.

## 2024-09-26 NOTE — TELEPHONE ENCOUNTER
Called pt caregiver blood pressure rechecked after taking medication and noted in chart   No acute distress noted

## 2024-09-26 NOTE — TELEPHONE ENCOUNTER
----- Message from Vickey Oseguera sent at 9/26/2024  4:02 PM CDT -----  Contact: samuelsin @ [phone]  Name of Who is Calling: lela        What is the request in detail: calling to discuss pt results with provider wants to verify if provider received blood pressure reading         Can the clinic reply by [facility]: no        What Number to Call Back if not in [facility]:[phone]

## 2024-09-26 NOTE — TELEPHONE ENCOUNTER
----- Message from Kala Chang sent at 9/26/2024  9:25 AM CDT -----  .Name of Caller: Ara  Best Call Back Number:[phone]  Additional Information: She called to give his blood pressure reading which is 149/88

## 2024-10-11 ENCOUNTER — TELEPHONE (OUTPATIENT)
Dept: INTERNAL MEDICINE | Facility: CLINIC | Age: 84
End: 2024-10-11
Payer: MEDICARE

## 2024-10-11 DIAGNOSIS — R00.1 BRADYCARDIA: Primary | ICD-10-CM

## 2024-10-11 NOTE — TELEPHONE ENCOUNTER
Spoke with pts daughter states the holter monitor was unsuccessful since the tubing came undone and the way he sleeps. Asking what other testing can be done in its place?   She can be reached at [phone]

## 2024-10-11 NOTE — TELEPHONE ENCOUNTER
----- Message from Bethany sent at 10/11/2024 10:17 AM CDT -----  Contact: Ms. Lee Phone# [phone]  Patients daughter Ms. Lee said that the patients Heart Monitor is not working for him.

## 2024-10-22 ENCOUNTER — HOSPITAL ENCOUNTER (OUTPATIENT)
Dept: CARDIOLOGY | Facility: HOSPITAL | Age: 84
Discharge: HOME | End: 2024-10-22
Attending: NURSE PRACTITIONER
Payer: MEDICARE

## 2024-10-22 DIAGNOSIS — R00.1 BRADYCARDIA: ICD-10-CM

## 2024-12-02 ENCOUNTER — PATIENT MESSAGE (OUTPATIENT)
Dept: CARDIOLOGY | Facility: CLINIC | Age: 84
End: 2024-12-02
Payer: MEDICARE

## 2024-12-27 DIAGNOSIS — F43.23 SITUATIONAL MIXED ANXIETY AND DEPRESSIVE DISORDER: ICD-10-CM

## 2024-12-27 RX ORDER — SERTRALINE HYDROCHLORIDE 50 MG/1
50 TABLET, FILM COATED ORAL
Qty: 90 TABLET | Refills: 2 | Status: SHIPPED | OUTPATIENT
Start: 2024-12-27

## 2024-12-27 NOTE — TELEPHONE ENCOUNTER
No care due was identified.  Health [facility] Embedded Care Due Messages. Reference number: [id number].   12/27/2024 12:24:49 AM CST

## 2024-12-27 NOTE — TELEPHONE ENCOUNTER
Refill Decision Note   Curtis Landry  is requesting a refill authorization.  Brief Assessment and Rationale for Refill:  Approve     Medication Therapy Plan:         Comments:     Note composed:8:24 AM 12/27/2024

## 2025-01-09 ENCOUNTER — PATIENT MESSAGE (OUTPATIENT)
Dept: INTERNAL MEDICINE | Facility: CLINIC | Age: 85
End: 2025-01-09
Payer: MEDICARE

## 2025-01-22 ENCOUNTER — TELEPHONE (OUTPATIENT)
Dept: HEMATOLOGY/ONCOLOGY | Facility: CLINIC | Age: 85
End: 2025-01-22
Payer: MEDICARE

## 2025-01-22 NOTE — TELEPHONE ENCOUNTER
Nurse spoke with Bela in regards to getting the pt rescheduled. Verbalized understanding of new scheduled visit. Call ended well

## 2025-01-22 NOTE — TELEPHONE ENCOUNTER
----- Message from Demetrius sent at 1/22/2025  1:32 PM CST -----  Name of Who is Calling:Bela         What is the request in detail:Bela would like a callback from the office in regards to discussing pt lab 1/23 appt and no f/u appt scheduled.Please advise thank you       Can the clinic reply by [facility]:NO         [facility]Bela [phone]

## 2025-02-07 ENCOUNTER — LAB VISIT (OUTPATIENT)
Dept: LAB | Facility: HOSPITAL | Age: 85
End: 2025-02-07
Attending: NURSE PRACTITIONER
Payer: MEDICARE

## 2025-02-07 DIAGNOSIS — D53.9 NUTRITIONAL ANEMIA, UNSPECIFIED: ICD-10-CM

## 2025-02-07 DIAGNOSIS — D64.9 ANEMIA, UNSPECIFIED TYPE: ICD-10-CM

## 2025-02-07 DIAGNOSIS — D47.2 SMOLDERING MYELOMA: ICD-10-CM

## 2025-02-07 DIAGNOSIS — D47.2 MGUS (MONOCLONAL GAMMOPATHY OF UNKNOWN SIGNIFICANCE): ICD-10-CM

## 2025-02-07 LAB
ALBUMIN SERPL BCP-MCNC: 4.1 G/DL (ref 3.5–5.2)
ALP SERPL-CCNC: 47 U/L (ref 40–150)
ALT SERPL W/O P-5'-P-CCNC: 13 U/L (ref 10–44)
ANION GAP SERPL CALC-SCNC: 8 MMOL/L (ref 8–16)
AST SERPL-CCNC: 16 U/L (ref 10–40)
B2 MICROGLOB SERPL-MCNC: 2.8 UG/ML (ref 0–2.5)
BASOPHILS # BLD AUTO: 0.03 K/UL (ref 0–0.2)
BASOPHILS NFR BLD: 0.5 % (ref 0–1.9)
BILIRUB SERPL-MCNC: 0.7 MG/DL (ref 0.1–1)
BUN SERPL-MCNC: 9 MG/DL (ref 8–23)
CALCIUM SERPL-MCNC: 10.6 MG/DL (ref 8.7–10.5)
CHLORIDE SERPL-SCNC: 107 MMOL/L (ref 95–110)
CO2 SERPL-SCNC: 25 MMOL/L (ref 23–29)
CREAT SERPL-MCNC: 1.5 MG/DL (ref 0.5–1.4)
DIFFERENTIAL METHOD BLD: ABNORMAL
EOSINOPHIL # BLD AUTO: 0.1 K/UL (ref 0–0.5)
EOSINOPHIL NFR BLD: 2 % (ref 0–8)
ERYTHROCYTE [DISTWIDTH] IN BLOOD BY AUTOMATED COUNT: 12.4 % (ref 11.5–14.5)
EST. GFR  (NO RACE VARIABLE): 46 ML/MIN/1.73 M^2
GLUCOSE SERPL-MCNC: 109 MG/DL (ref 70–110)
HCT VFR BLD AUTO: 35.9 % (ref 40–54)
HGB BLD-MCNC: 12 G/DL (ref 14–18)
IMM GRANULOCYTES # BLD AUTO: 0.01 K/UL (ref 0–0.04)
IMM GRANULOCYTES NFR BLD AUTO: 0.2 % (ref 0–0.5)
LDH SERPL L TO P-CCNC: 143 U/L (ref 110–260)
LYMPHOCYTES # BLD AUTO: 1.7 K/UL (ref 1–4.8)
LYMPHOCYTES NFR BLD: 31 % (ref 18–48)
MCH RBC QN AUTO: 31.2 PG (ref 27–31)
MCHC RBC AUTO-ENTMCNC: 33.4 G/DL (ref 32–36)
MCV RBC AUTO: 93 FL (ref 82–98)
MONOCYTES # BLD AUTO: 0.5 K/UL (ref 0.3–1)
MONOCYTES NFR BLD: 8.4 % (ref 4–15)
NEUTROPHILS # BLD AUTO: 3.3 K/UL (ref 1.8–7.7)
NEUTROPHILS NFR BLD: 57.9 % (ref 38–73)
NRBC BLD-RTO: 0 /100 WBC
PLATELET # BLD AUTO: 239 K/UL (ref 150–450)
PMV BLD AUTO: 9.2 FL (ref 9.2–12.9)
POTASSIUM SERPL-SCNC: 4.3 MMOL/L (ref 3.5–5.1)
PROT SERPL-MCNC: 9 G/DL (ref 6–8.4)
RBC # BLD AUTO: 3.85 M/UL (ref 4.6–6.2)
SODIUM SERPL-SCNC: 140 MMOL/L (ref 136–145)
WBC # BLD AUTO: 5.62 K/UL (ref 3.9–12.7)

## 2025-02-07 PROCEDURE — 83521 IG LIGHT CHAINS FREE EACH: CPT | Performed by: NURSE PRACTITIONER

## 2025-02-07 PROCEDURE — 82746 ASSAY OF FOLIC ACID SERUM: CPT | Performed by: NURSE PRACTITIONER

## 2025-02-07 PROCEDURE — 84165 PROTEIN E-PHORESIS SERUM: CPT | Performed by: NURSE PRACTITIONER

## 2025-02-07 PROCEDURE — 84165 PROTEIN E-PHORESIS SERUM: CPT | Mod: 26,,, | Performed by: PATHOLOGY

## 2025-02-07 PROCEDURE — 82784 ASSAY IGA/IGD/IGG/IGM EACH: CPT | Mod: 59 | Performed by: NURSE PRACTITIONER

## 2025-02-07 PROCEDURE — 83615 LACTATE (LD) (LDH) ENZYME: CPT | Performed by: NURSE PRACTITIONER

## 2025-02-07 PROCEDURE — 85025 COMPLETE CBC W/AUTO DIFF WBC: CPT | Performed by: NURSE PRACTITIONER

## 2025-02-07 PROCEDURE — 36415 COLL VENOUS BLD VENIPUNCTURE: CPT | Mod: PO | Performed by: NURSE PRACTITIONER

## 2025-02-07 PROCEDURE — 86334 IMMUNOFIX E-PHORESIS SERUM: CPT | Performed by: NURSE PRACTITIONER

## 2025-02-07 PROCEDURE — 82728 ASSAY OF FERRITIN: CPT | Performed by: NURSE PRACTITIONER

## 2025-02-07 PROCEDURE — 83540 ASSAY OF IRON: CPT | Performed by: NURSE PRACTITIONER

## 2025-02-07 PROCEDURE — 82232 ASSAY OF BETA-2 PROTEIN: CPT | Performed by: NURSE PRACTITIONER

## 2025-02-07 PROCEDURE — 86334 IMMUNOFIX E-PHORESIS SERUM: CPT | Mod: 26,,, | Performed by: PATHOLOGY

## 2025-02-07 PROCEDURE — 82607 VITAMIN B-12: CPT | Performed by: NURSE PRACTITIONER

## 2025-02-07 PROCEDURE — 80053 COMPREHEN METABOLIC PANEL: CPT | Performed by: NURSE PRACTITIONER

## 2025-02-08 LAB
FERRITIN SERPL-MCNC: 705 NG/ML (ref 20–300)
FOLATE SERPL-MCNC: 9.6 NG/ML (ref 4–24)
IGA SERPL-MCNC: 149 MG/DL (ref 40–350)
IGG SERPL-MCNC: 2339 MG/DL (ref 650–1600)
IGM SERPL-MCNC: 38 MG/DL (ref 50–300)
IRON SERPL-MCNC: 71 UG/DL (ref 45–160)
SATURATED IRON: 20 % (ref 20–50)
TOTAL IRON BINDING CAPACITY: 361 UG/DL (ref 250–450)
TRANSFERRIN SERPL-MCNC: 244 MG/DL (ref 200–375)
VIT B12 SERPL-MCNC: 263 PG/ML (ref 210–950)

## 2025-02-10 LAB
ALBUMIN SERPL ELPH-MCNC: 4.71 G/DL (ref 3.35–5.55)
ALPHA1 GLOB SERPL ELPH-MCNC: 0.3 G/DL (ref 0.17–0.41)
ALPHA2 GLOB SERPL ELPH-MCNC: 0.9 G/DL (ref 0.43–0.99)
B-GLOBULIN SERPL ELPH-MCNC: 0.81 G/DL (ref 0.5–1.1)
GAMMA GLOB SERPL ELPH-MCNC: 1.99 G/DL (ref 0.67–1.58)
INTERPRETATION SERPL IFE-IMP: NORMAL
KAPPA LC SER QL IA: 2.3 MG/DL (ref 0.33–1.94)
KAPPA LC/LAMBDA SER IA: 0.52 (ref 0.26–1.65)
LAMBDA LC SER QL IA: 4.4 MG/DL (ref 0.57–2.63)
PATHOLOGIST INTERPRETATION IFE: NORMAL
PATHOLOGIST INTERPRETATION SPE: NORMAL
PROT SERPL-MCNC: 8.7 G/DL (ref 6–8.4)

## 2025-02-11 ENCOUNTER — TELEPHONE (OUTPATIENT)
Dept: HEMATOLOGY/ONCOLOGY | Facility: CLINIC | Age: 85
End: 2025-02-11

## 2025-02-11 ENCOUNTER — OFFICE VISIT (OUTPATIENT)
Dept: HEMATOLOGY/ONCOLOGY | Facility: CLINIC | Age: 85
End: 2025-02-11
Payer: MEDICARE

## 2025-02-11 VITALS
TEMPERATURE: 99 F | OXYGEN SATURATION: 96 % | SYSTOLIC BLOOD PRESSURE: 157 MMHG | HEIGHT: 66 IN | WEIGHT: 200.75 LBS | DIASTOLIC BLOOD PRESSURE: 81 MMHG | HEART RATE: 73 BPM | BODY MASS INDEX: 32.26 KG/M2

## 2025-02-11 DIAGNOSIS — N18.31 CHRONIC KIDNEY DISEASE, STAGE 3A: ICD-10-CM

## 2025-02-11 DIAGNOSIS — D47.2 MGUS (MONOCLONAL GAMMOPATHY OF UNKNOWN SIGNIFICANCE): Primary | ICD-10-CM

## 2025-02-11 DIAGNOSIS — D64.9 ANEMIA, UNSPECIFIED TYPE: ICD-10-CM

## 2025-02-11 PROCEDURE — 1157F ADVNC CARE PLAN IN RCRD: CPT | Mod: CPTII,S$GLB,, | Performed by: NURSE PRACTITIONER

## 2025-02-11 PROCEDURE — 1160F RVW MEDS BY RX/DR IN RCRD: CPT | Mod: CPTII,S$GLB,, | Performed by: NURSE PRACTITIONER

## 2025-02-11 PROCEDURE — 1159F MED LIST DOCD IN RCRD: CPT | Mod: CPTII,S$GLB,, | Performed by: NURSE PRACTITIONER

## 2025-02-11 PROCEDURE — 1126F AMNT PAIN NOTED NONE PRSNT: CPT | Mod: CPTII,S$GLB,, | Performed by: NURSE PRACTITIONER

## 2025-02-11 PROCEDURE — 99999 PR PBB SHADOW E&M-EST. PATIENT-LVL III: CPT | Mod: PBBFAC,,, | Performed by: NURSE PRACTITIONER

## 2025-02-11 PROCEDURE — 99214 OFFICE O/P EST MOD 30 MIN: CPT | Mod: S$GLB,,, | Performed by: NURSE PRACTITIONER

## 2025-02-11 PROCEDURE — 3077F SYST BP >= 140 MM HG: CPT | Mod: CPTII,S$GLB,, | Performed by: NURSE PRACTITIONER

## 2025-02-11 PROCEDURE — 3079F DIAST BP 80-89 MM HG: CPT | Mod: CPTII,S$GLB,, | Performed by: NURSE PRACTITIONER

## 2025-02-11 NOTE — TELEPHONE ENCOUNTER
----- Message from Geeta sent at 2/11/2025  4:41 PM CST -----  Name of Who is Calling: Bela pt's cousin        What is the request in detail:Bela pt's cousin would like a call back to scheduling blood work. Bela would like for blood work to be done in [facility]. Please advise thank you            Can the clinic reply by [facility]:no        What Number [facility] Back [facility] Bela [phone]   Griseofulvin Counseling:  I discussed with the patient the risks of griseofulvin including but not limited to photosensitivity, cytopenia, liver damage, nausea/vomiting and severe allergy.  The patient understands that this medication is best absorbed when taken with a fatty meal (e.g., ice cream or french fries).

## 2025-02-11 NOTE — TELEPHONE ENCOUNTER
I returned call to Ms. Cramer pt relative, and pt lab was scheduled for 05/23/25 at the [facility] per her request, and pt will see Ms. Curtis on 05/28/25.

## 2025-02-11 NOTE — ASSESSMENT & PLAN NOTE
Interval decline in kidney function 9/2024. Creatinine 1.7 at that time. Most recently improved creatinine 1.5. note calcium 10.6. family member denies knowledge of any new medications. She is unaware of patient's daily fluid intake. 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 Baseline anemia unchanged. Paraproteinemia stable.     For now will continue to monitor. We discussed possible repeat bmbx in the future but patient and family unsure if they would want to proceed with repeat biopsy. F/u 3 months with repeat labs prior. Discussed S&S to report sooner

## 2025-02-11 NOTE — PROGRESS NOTES
Subjective:       Patient ID: Curtis Landry is a 84 y.o. male.    Chief Complaint: Review labs. H/o MGUS    HPI: 84 y.o male presenting today for follow up of his previously diagnosed MGUS, chronic anemia. This a former patient of Dr. La who has a hx of chronic anemia and a MGUS of many years duration. He had a bone marrow in  that failed to show myeloma or other abnormalities. Of note he has a hx of prostate cancer s/p prostatectomy 2001.    He feels well and is without complaints. He denies any anemia symptoms or B symptoms. Reports doing well since most recent visit. He presents accompanied by a family member. Denies interval clinical concerns.   Social History     Socioeconomic History    Marital status:     Number of children: 1    Highest education level: 8th grade   Tobacco Use    Smoking status: Former     Current packs/day: 0.00     Average packs/day: 0.1 packs/day for 10.0 years (1.0 ttl pk-yrs)     Types: Cigarettes     Start date: 1970     Quit date: 1980     Years since quittin.1    Smokeless tobacco: Never   Substance and Sexual Activity    Alcohol use: No    Drug use: No    Sexual activity: Not Currently     Partners: Female     Social Drivers of Health     Financial Resource Strain: Medium Risk (2023)    Overall Financial Resource Strain (CARDIA)     Difficulty of Paying Living Expenses: Somewhat hard   Food Insecurity: No Food Insecurity (2023)    Hunger Vital Sign     Worried About Running Out of Food in the Last Year: Never true     Ran Out of Food in the Last Year: Never true   Transportation Needs: No Transportation Needs (2023)    PRAPARE - Transportation     Lack of Transportation (Medical): No     Lack of Transportation (Non-Medical): No   Physical Activity: Unknown (2023)    Exercise Vital Sign     Days of Exercise per Week: Patient declined   Stress: Stress Concern Present (2023)    [employer] -  Occupational Stress Questionnaire     Feeling of Stress : To some extent   Housing Stability: Unknown (12/26/2023)    Housing Stability Vital Sign     Unable to Pay for Housing in the Last Year: No     Unstable Housing in the Last Year: No       Past Medical History:   Diagnosis Date    Anemia     Angina pectoris     Arthritis     CHF (congestive heart failure)     Glaucoma (increased eye pressure)     Followed by outside opthalmology    HTN (hypertension)     Hyperlipidemia     Macular degeneration     Pneumonia     did not require hospitalization    Prostate cancer     Prostate cancer     Situational mixed anxiety and depressive disorder 10/17/2023    Urinary incontinence        Family History   Problem Relation Name Age of Onset    Cancer Mother Luvenia         pancreas    Cancer Father DW     No Known Problems Daughter      Diabetes Neg Hx      Heart disease Neg Hx      Hyperlipidemia Neg Hx      Hypertension Neg Hx      Kidney disease Neg Hx      Stroke Neg Hx         Past Surgical History:   Procedure Laterality Date    APPENDECTOMY      CARDIAC DEFIBRILLATOR PLACEMENT      CARDIAC PACEMAKER PLACEMENT      CARDIAC PACEMAKER PLACEMENT      EYE SURGERY      GALLBLADDER SURGERY      PROSTATECTOMY      TOTAL HIP ARTHROPLASTY         Review of Systems   Constitutional:  Negative for activity change, appetite change, chills, fatigue, fever and unexpected weight change.   HENT:  Negative for congestion, mouth sores, nosebleeds, sore throat, trouble swallowing and voice change.    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   Respiratory:  Negative for cough, chest tightness, shortness of breath and wheezing.    Cardiovascular:  Negative for chest pain and leg swelling.   Gastrointestinal:  Negative for abdominal distention, abdominal pain, blood in stool, constipation, diarrhea, nausea and vomiting.   Genitourinary:  Negative for difficulty urinating, dysuria and hematuria.   Musculoskeletal:   Positive for gait problem (utilizes cane). Negative for arthralgias, back pain and myalgias.   Skin:  Negative for pallor, rash and wound.   Neurological:  Negative for dizziness, syncope, weakness and headaches.   Hematological:  Negative for adenopathy. Does not bruise/bleed easily.   Psychiatric/Behavioral:  The patient is not nervous/anxious.          Medication List with Changes/Refills   Current Medications    ACETAMINOPHEN (TYLENOL) 650 MG TBSR    Take 1 tablet (650 mg total) by mouth every 8 (eight) hours.    ASPIRIN (ECOTRIN) 81 MG EC TABLET    Take by mouth. 1 Tablet, Delayed Release (E.C.) Oral Every day    CARVEDILOL (COREG) 12.5 MG TABLET    Take 0.5 tablets (6.25 mg total) by mouth 2 (two) times daily.    FERROUS GLUCONATE (FERGON) 240 (27 FE) MG TABLET    Take 240 mg by mouth every other day.    LOSARTAN (COZAAR) 50 MG TABLET    TAKE 1 TABLET BY MOUTH EVERY DAY    SERTRALINE (ZOLOFT) 50 MG TABLET    TAKE 1 TABLET BY MOUTH EVERY DAY    TRAVOPROST (TRAVATAN Z) 0.004 % OPHTHALMIC SOLUTION    Inject into the eye. 1 Drops Ophthalmic At bedtime     Objective:     Vitals:    02/11/25 1053   BP: (!) 157/81   Pulse: 73   Temp: 98.6 °F (37 °C)       Lab Results   Component Value Date    WBC 5.62 02/07/2025    HGB 12.0 (L) 02/07/2025    HCT 35.9 (L) 02/07/2025    MCV 93 02/07/2025     02/07/2025       BMP  Lab Results   Component Value Date     02/07/2025    K 4.3 02/07/2025     02/07/2025    CO2 25 02/07/2025    BUN 9 02/07/2025    CREATININE 1.5 (H) 02/07/2025    CALCIUM 10.6 (H) 02/07/2025    ANIONGAP 8 02/07/2025    EGFRNORACEVR 46 (A) 02/07/2025     Lab Results   Component Value Date    ALT 13 02/07/2025    AST 16 02/07/2025    ALKPHOS 47 02/07/2025    BILITOT 0.7 02/07/2025     Lab Results   Component Value Date    UIBC 191 04/09/2012    IRON 71 02/07/2025    TRANSFERRIN 244 02/07/2025    TIBC 361 02/07/2025    FESATURATED 20 02/07/2025          Physical Exam  HENT:      Right Ear:  External ear normal.      Left Ear: External ear normal.   Eyes:      Conjunctiva/sclera: Conjunctivae normal.   Cardiovascular:      Rate and Rhythm: Normal rate.   Pulmonary:      Effort: Pulmonary effort is normal. No respiratory distress.      Breath sounds: Normal breath sounds. No stridor. No wheezing, rhonchi or rales.   Musculoskeletal:         General: Normal range of motion.      Cervical back: Normal range of motion.   Lymphadenopathy:      Head:      Right side of head: No submandibular, preauricular or posterior auricular adenopathy.      Left side of head: No submandibular, preauricular or posterior auricular adenopathy.      Cervical:      Right cervical: No superficial cervical adenopathy.     Left cervical: No superficial cervical adenopathy.   Neurological:      Mental Status: He is alert and oriented to person, place, and time.          Assessment:     Problem List Items Addressed This Visit          Renal/    Chronic kidney disease, stage 3a     Interval decline in kidney function 9/2024. Creatinine 1.7 at that time. Most recently improved creatinine 1.5. note calcium 10.6. family member denies knowledge of any new medications. She is unaware of patient's daily fluid intake. 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 Baseline anemia unchanged. Paraproteinemia stable.     For now will continue to monitor. We discussed possible repeat bmbx in the future but patient and family unsure if they would want to proceed with repeat biopsy. F/u 3 months with repeat labs prior. Discussed S&S to report sooner         Relevant Orders    CBC Auto Differential    Comprehensive Metabolic Panel    Ferritin    Lactate Dehydrogenase    Iron and TIBC    Immunoglobulins (IgG, IgA, IgM) Quantitative    Immunofixation Electrophoresis    Immunoglobulin Free LT Chains Blood    Protein Electrophoresis, Serum    Beta 2  Microglobulin, Serum       Oncology    MGUS (monoclonal gammopathy of unknown significance) - Primary     Lab changes significant for Interval decline in kidney function 9/2024. Creatinine 1.7 at that time. Most recently improved creatinine 1.5. note calcium 10.6. family member denies knowledge of any new medications. She is unaware of patient's daily fluid intake. 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 Baseline anemia unchanged. Paraproteinemia stable.     For now will continue to monitor. We discussed possible repeat bmbx in the future but patient and family unsure if they would want to proceed with repeat biopsy. F/u 3 months with repeat labs prior. Discussed S&S to report sooner         Relevant Orders    CBC Auto Differential    Comprehensive Metabolic Panel    Ferritin    Lactate Dehydrogenase    Iron and TIBC    Immunoglobulins (IgG, IgA, IgM) Quantitative    Immunofixation Electrophoresis    Immunoglobulin Free LT Chains Blood    Protein Electrophoresis, Serum    Beta 2 Microglobulin, Serum     Other Visit Diagnoses       Anemia, unspecified type        Relevant Orders    CBC Auto Differential    Comprehensive Metabolic Panel    Ferritin    Lactate Dehydrogenase    Iron and TIBC    Immunoglobulins (IgG, IgA, IgM) Quantitative    Immunofixation Electrophoresis    Immunoglobulin Free LT Chains Blood    Protein Electrophoresis, Serum    Beta 2 Microglobulin, Serum            Route Chart for Scheduling    Med Onc Chart Routing      Follow up with physician    Follow up with FIORELLA 3 months. please call Bela with appointments. she doesn't use my chart is brings patients to appointments   Infusion scheduling note    Injection scheduling note    Labs Beta 2 microglobulin, CBC, CMP, free light chains, iron and TIBC, ferritin, LDH, other and SPEP   Scheduling:  Preferred lab:  Lab interval:  few days prior   Imaging  None      Pharmacy appointment No pharmacy appointment needed      Other referrals       No additional referrals needed                  Plan:     MGUS (monoclonal gammopathy of unknown significance)  -     CBC Auto Differential; Future; Expected date: 02/11/2025  -     Comprehensive Metabolic Panel; Future; Expected date: 02/11/2025  -     Ferritin; Future; Expected date: 02/11/2025  -     Lactate Dehydrogenase; Future; Expected date: 02/11/2025  -     Iron and TIBC; Future; Expected date: 02/11/2025  -     Immunoglobulins (IgG, IgA, IgM) Quantitative; Future; Expected date: 02/11/2025  -     Immunofixation Electrophoresis; Future; Expected date: 02/11/2025  -     Immunoglobulin Free LT Chains Blood; Future; Expected date: 02/11/2025  -     Protein Electrophoresis, Serum; Future; Expected date: 02/11/2025  -     Beta 2 Microglobulin, Serum; Future; Expected date: 02/11/2025    Chronic kidney disease, stage 3a  -     CBC Auto Differential; Future; Expected date: 02/11/2025  -     Comprehensive Metabolic Panel; Future; Expected date: 02/11/2025  -     Ferritin; Future; Expected date: 02/11/2025  -     Lactate Dehydrogenase; Future; Expected date: 02/11/2025  -     Iron and TIBC; Future; Expected date: 02/11/2025  -     Immunoglobulins (IgG, IgA, IgM) Quantitative; Future; Expected date: 02/11/2025  -     Immunofixation Electrophoresis; Future; Expected date: 02/11/2025  -     Immunoglobulin Free LT Chains Blood; Future; Expected date: 02/11/2025  -     Protein Electrophoresis, Serum; Future; Expected date: 02/11/2025  -     Beta 2 Microglobulin, Serum; Future; Expected date: 02/11/2025    Anemia, unspecified type  -     CBC Auto Differential; Future; Expected date: 02/11/2025  -     Comprehensive Metabolic Panel; Future; Expected date: 02/11/2025  -     Ferritin; Future; Expected date: 02/11/2025  -     Lactate Dehydrogenase; Future; Expected date: 02/11/2025  -     Iron and TIBC; Future; Expected date: 02/11/2025  -      Immunoglobulins (IgG, IgA, IgM) Quantitative; Future; Expected date: 02/11/2025  -     Immunofixation Electrophoresis; Future; Expected date: 02/11/2025  -     Immunoglobulin Free LT Chains Blood; Future; Expected date: 02/11/2025  -     Protein Electrophoresis, Serum; Future; Expected date: 02/11/2025  -     Beta 2 Microglobulin, Serum; Future; Expected date: 02/11/2025            CONY Finnegan

## 2025-02-11 NOTE — ASSESSMENT & PLAN NOTE
Lab changes significant for Interval decline in kidney function 9/2024. Creatinine 1.7 at that time. Most recently improved creatinine 1.5. note calcium 10.6. family member denies knowledge of any new medications. She is unaware of patient's daily fluid intake. 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 Baseline anemia unchanged. Paraproteinemia stable.     For now will continue to monitor. We discussed possible repeat bmbx in the future but patient and family unsure if they would want to proceed with repeat biopsy. F/u 3 months with repeat labs prior. Discussed S&S to report sooner

## 2025-02-12 ENCOUNTER — DOCUMENTATION ONLY (OUTPATIENT)
Dept: HEMATOLOGY/ONCOLOGY | Facility: CLINIC | Age: 85
End: 2025-02-12
Payer: MEDICARE

## 2025-02-12 ENCOUNTER — TELEPHONE (OUTPATIENT)
Dept: HEMATOLOGY/ONCOLOGY | Facility: CLINIC | Age: 85
End: 2025-02-12
Payer: MEDICARE

## 2025-02-12 NOTE — PROGRESS NOTES
DAVIE Intern messaged Dr. Richard and Vandana Olson regarding pt concerns. Dr. Richard will f/u virtually with pt's family regarding blood pressure and blood work done. DAVIE Intern will remain available.

## 2025-02-12 NOTE — TELEPHONE ENCOUNTER
Office Visit  2/11/2025  [facility] 2nd Fl  Default Flowsheet Data (all recorded)    Distress Management    Row Name 02/11/25 1052       Distress Management Score   Distress Score 9       Physical Concerns   Physical Concerns Loss or change of physical abilities         DAVIE Siegel spoke with dgthr re: the above. DAVIE Siegel initially contacted Bela, who stated that pt's dgthr (POA) had some medical concerns regarding pt's blood pressure and blood work done. DAVIE Siegel contacted pt's dgthr who expressed her concerns. Pt's dgthr requested additional interventions at this time. Pt's dgthr stated she is interested in speaking to  because his blood pressure is high and has some questions about the blood work. DAVIE Siegel will remain available.

## 2025-02-13 ENCOUNTER — TELEPHONE (OUTPATIENT)
Dept: INTERNAL MEDICINE | Facility: CLINIC | Age: 85
End: 2025-02-13
Payer: MEDICARE

## 2025-02-13 DIAGNOSIS — I50.9 CHF (NYHA CLASS III, ACC/AHA STAGE C): Primary | Chronic | ICD-10-CM

## 2025-02-13 NOTE — TELEPHONE ENCOUNTER
Spoke with daughter and informed her that med list has Carvedilol 12.5 mg 1/2 tablet BID. Daughter states he is only able to take it once a day due to he is blind and she only has a caregiver there for 5 hours a day.

## 2025-02-13 NOTE — TELEPHONE ENCOUNTER
----- Message from Isatu sent at 2/13/2025 10:19 AM CST -----  Contact: Milly/ Daughter  .Type:  Needs Medical Advice    Who Called:  Santiagodaniela     Would the patient rather a call back or a response via [facility]?  Call back   Best Call Back Number:   [phone]  Additional Information: Santiagodaniela is calling in regard to getting a call back to discuss questions pertaining to medication     Thanks

## 2025-02-14 ENCOUNTER — TELEPHONE (OUTPATIENT)
Dept: CARDIOLOGY | Facility: CLINIC | Age: 85
End: 2025-02-14
Payer: MEDICARE

## 2025-02-14 RX ORDER — METOPROLOL SUCCINATE 25 MG/1
25 TABLET, EXTENDED RELEASE ORAL DAILY
Qty: 90 TABLET | Refills: 3 | Status: SHIPPED | OUTPATIENT
Start: 2025-02-14 | End: 2026-02-14

## 2025-02-14 NOTE — TELEPHONE ENCOUNTER
Please call patient/daughter     Change BB to Toprol XL 25 mg daily due to issues with taking Coreg twice a day     Thanks  CONY Bhatti       Spoke to Bela (care giver) verbalized understanding states she will let the daughter know.

## 2025-02-14 NOTE — TELEPHONE ENCOUNTER
Please call patient/daughter    Change BB to Toprol XL 25 mg daily due to issues with taking Coreg twice a day    Thanks  CONY Bhatti

## 2025-02-14 NOTE — TELEPHONE ENCOUNTER
My Staff: Please advise that I will route this to Cardiology team to review and advise on. Thank you!     Kath: please review below and advise patient/family. Thank you!

## 2025-02-24 ENCOUNTER — HOSPITAL ENCOUNTER (OUTPATIENT)
Dept: CARDIOLOGY | Facility: HOSPITAL | Age: 85
Discharge: HOME | End: 2025-02-24
Attending: NURSE PRACTITIONER
Payer: MEDICARE

## 2025-02-24 ENCOUNTER — OFFICE VISIT (OUTPATIENT)
Dept: CARDIOLOGY | Facility: CLINIC | Age: 85
End: 2025-02-24
Payer: MEDICARE

## 2025-02-24 VITALS — DIASTOLIC BLOOD PRESSURE: 60 MMHG | SYSTOLIC BLOOD PRESSURE: 144 MMHG

## 2025-02-24 DIAGNOSIS — I42.8 NICM (NONISCHEMIC CARDIOMYOPATHY): Primary | ICD-10-CM

## 2025-02-24 DIAGNOSIS — Z95.810 AUTOMATIC IMPLANTABLE CARDIOVERTER-DEFIBRILLATOR IN SITU: ICD-10-CM

## 2025-02-24 DIAGNOSIS — I25.5 ISCHEMIC CARDIOMYOPATHY: ICD-10-CM

## 2025-02-24 DIAGNOSIS — Z95.810 ICD (IMPLANTABLE CARDIOVERTER-DEFIBRILLATOR) IN PLACE: ICD-10-CM

## 2025-02-24 DIAGNOSIS — I50.9 CHF (NYHA CLASS III, ACC/AHA STAGE C): ICD-10-CM

## 2025-02-24 DIAGNOSIS — I77.1 TORTUOUS AORTA: ICD-10-CM

## 2025-02-24 DIAGNOSIS — D63.8 ANEMIA IN CHRONIC ILLNESS: ICD-10-CM

## 2025-02-24 PROCEDURE — 3078F DIAST BP <80 MM HG: CPT | Mod: CPTII,S$GLB,, | Performed by: NURSE PRACTITIONER

## 2025-02-24 PROCEDURE — 3288F FALL RISK ASSESSMENT DOCD: CPT | Mod: CPTII,S$GLB,, | Performed by: NURSE PRACTITIONER

## 2025-02-24 PROCEDURE — 93283 PRGRMG EVAL IMPLANTABLE DFB: CPT

## 2025-02-24 PROCEDURE — 1157F ADVNC CARE PLAN IN RCRD: CPT | Mod: CPTII,S$GLB,, | Performed by: NURSE PRACTITIONER

## 2025-02-24 PROCEDURE — 99214 OFFICE O/P EST MOD 30 MIN: CPT | Mod: S$GLB,,, | Performed by: NURSE PRACTITIONER

## 2025-02-24 PROCEDURE — 99999 PR PBB SHADOW E&M-EST. PATIENT-LVL III: CPT | Mod: PBBFAC,,, | Performed by: NURSE PRACTITIONER

## 2025-02-24 PROCEDURE — 1159F MED LIST DOCD IN RCRD: CPT | Mod: CPTII,S$GLB,, | Performed by: NURSE PRACTITIONER

## 2025-02-24 PROCEDURE — 1101F PT FALLS ASSESS-DOCD LE1/YR: CPT | Mod: CPTII,S$GLB,, | Performed by: NURSE PRACTITIONER

## 2025-02-24 PROCEDURE — 3077F SYST BP >= 140 MM HG: CPT | Mod: CPTII,S$GLB,, | Performed by: NURSE PRACTITIONER

## 2025-02-24 NOTE — PROGRESS NOTES
Subjective:   Patient ID:  Curtis Landry is a 84 y.o. male who presents for evaluation of Follow-up        HPI   Primary Cardiologist: Formerly Dr Boyd    Cardiac Problems:  1. CHF, NYHA FC III, EF recovered to 50% on 6/2020  2. NICM  3. S/P DC ICD ([employer])  4. HTN  5. HLP  6 Bigeminy    Curtis Landry returns to CHF clinic for routine follow up.     Denies chest pain or anginal equivalents. No shortness of breath, CORBIN or palpitations. Denies orthopnea, PND or abdominal bloating. Reports regular walking without any issues lately. NO leg swelling or claudications. No recent falls, syncope or near syncopal events. Reports compliance with medications and dietary restrictions. NO CNS complaints to suggest TIA or CVA today. No signs of abnormal bleeding on ASA daily    Device check completed today in office, brief AFib episodes noted on interrogation. Given fall risk, low burden and anemia will continue to monitor for now and continue with BB and ASA daily.     Uses cane for fall prevention.   Has been doing well with sodium and fluid restrictions.   Denies any acute issues today in office.     9/29/2022 update    Curtis Landry returns for follow up with device check. Well functioning device without any arrhythmias or ICD firing. He is doing well without any cardiac complaints. 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     Denies chest pain or anginal equivalents. No shortness of breath, CORBIN or palpitations. Denies orthopnea, PND or abdominal bloating. Reports regular walking without any issues lately. NO leg swelling or claudications. No recent falls, syncope or near syncopal events. Reports compliance with medications and dietary restrictions. NO CNS complaints to suggest TIA or CVA today. No signs of abnormal bleeding on ASA daily.     3/29/2023 update  He returns today and states he is doing ok. He is having more shortness of breath issues with exertional activities.     He is doing  exercises while sitting in the chair without any issues.     BP well controlled today.     No leg swelling on exam today.   ICD interrogation completed today- well functioning device.     NO chest pain or anginal equivalents.   No dizziness, syncope or near syncopal events.     Reports compliance with medications. Needs to be more compliant with dietary restrictions.  No signs of abnormal bleeding on ASA daily.       2/29/2024 update    Curtis Landry returns for follow up.    Recent echo reviewed- EF 45-50%, Grade I DD    Continues to endorse shortness of breath with any amount of exertional activities.     Denies chest pain or anginal equivalents. Denies orthopnea, PND or abdominal bloating. Reports regular walking without any issues lately. NO leg swelling or claudications. No recent falls, syncope or near syncopal events. Reports compliance with medications and dietary restrictions. NO CNS complaints to suggest TIA or CVA today. No signs of abnormal bleeding on ASA daily.       8/28/2024 update    Curtis Landry returns for 6 month follow up with device check.   Device check well functioning today in office.     Using cane for fall prevention with decreased vision.   Reports shortness of breath stable recently.     Reports compliance with medications and dietary restrictions.     Denies chest pain or anginal equivalents. No shortness of breath, CORBIN or palpitations. Denies orthopnea, PND or abdominal bloating. Reports regular walking without any issues lately. NO leg swelling or claudications. No recent falls, syncope or near syncopal events. Reports compliance with medications and dietary restrictions. NO CNS complaints to suggest TIA or CVA today. No signs of abnormal bleeding on ASA daily.     2/24/2025 update    Curtis Landry returns for 6 month follow up with device check.     Device check showed well functioning PPM unit, 1.5 years battery longevity remaining, Underlying rhythm SR @ 98,  ms.      Using wheelchair today in office.     He does have a sitter at home to assist with meds and meals. Needs all meds to be once a day.     BP stable today in office.     His daughter does report that he is more sedentary than previously, he is eating meals and taking naps between meals. He is drinking fluids with each meal but none between meals. He endorses one Dr Pepper per day.     Denies chest pain or anginal equivalents. No shortness of breath, CORBIN or palpitations. Denies orthopnea, PND or abdominal bloating.  NO leg swelling or claudications. No recent falls, syncope or near syncopal events. Reports compliance with medications and dietary restrictions. NO CNS complaints to suggest TIA or CVA today. No signs of abnormal bleeding on ASA daily.     Past Medical History:   Diagnosis Date    Anemia     Angina pectoris     Arthritis     CHF (congestive heart failure)     Glaucoma (increased eye pressure)     Followed by outside opthalmology    HTN (hypertension)     Hyperlipidemia     Macular degeneration     Pneumonia     did not require hospitalization    Prostate cancer     Prostate cancer     Situational mixed anxiety and depressive disorder 10/17/2023    Urinary incontinence        Past Surgical History:   Procedure Laterality Date    APPENDECTOMY      CARDIAC DEFIBRILLATOR PLACEMENT      CARDIAC PACEMAKER PLACEMENT      CARDIAC PACEMAKER PLACEMENT      EYE SURGERY      GALLBLADDER SURGERY      PROSTATECTOMY      TOTAL HIP ARTHROPLASTY         Social History     Tobacco Use    Smoking status: Former     Current packs/day: 0.00     Average packs/day: 0.1 packs/day for 10.0 years (1.0 ttl pk-yrs)     Types: Cigarettes     Start date: 1970     Quit date: 1980     Years since quittin.1    Smokeless tobacco: Never   Substance Use Topics    Alcohol use: No    Drug use: No       Family History   Problem Relation Name Age of Onset    Cancer Mother Luvenia         pancreas    Cancer Father DW     No Known  Problems Daughter      Diabetes Neg Hx      Heart disease Neg Hx      Hyperlipidemia Neg Hx      Hypertension Neg Hx      Kidney disease Neg Hx      Stroke Neg Hx       Wt Readings from Last 3 Encounters:   02/11/25 91 kg (200 lb 11.7 oz)   09/19/24 89.8 kg (197 lb 15.6 oz)   09/16/24 90.6 kg (199 lb 11.8 oz)     Temp Readings from Last 3 Encounters:   02/11/25 98.6 °F (37 °C) (Temporal)   07/25/24 97.6 °F (36.4 °C) (Temporal)   10/12/23 96.9 °F (36.1 °C) (Tympanic)     BP Readings from Last 3 Encounters:   02/24/25 (!) 144/60   02/11/25 (!) 157/81   09/26/24 138/81     Pulse Readings from Last 3 Encounters:   02/11/25 73   09/19/24 (!) 41   08/28/24 72     Current Outpatient Medications on File Prior to Visit   Medication Sig Dispense Refill    acetaminophen (TYLENOL) 650 MG TbSR Take 1 tablet (650 mg total) by mouth every 8 (eight) hours.  0    aspirin (ECOTRIN) 81 MG EC tablet Take by mouth. 1 Tablet, Delayed Release (E.C.) Oral Every day      ferrous gluconate (FERGON) 240 (27 FE) MG tablet Take 240 mg by mouth every other day.      losartan (COZAAR) 50 MG tablet TAKE 1 TABLET BY MOUTH EVERY DAY 90 tablet 3    metoprolol succinate (TOPROL-XL) 25 MG 24 hr tablet Take 1 tablet (25 mg total) by mouth once daily. 90 tablet 3    sertraline (ZOLOFT) 50 MG tablet TAKE 1 TABLET BY MOUTH EVERY DAY 90 tablet 2    travoprost (TRAVATAN Z) 0.004 % ophthalmic solution Inject into the eye. 1 Drops Ophthalmic At bedtime       No current facility-administered medications on file prior to visit.       Review of Systems   Constitutional: Positive for malaise/fatigue.   HENT:  Negative for hearing loss and hoarse voice.    Eyes:  Negative for blurred vision and visual disturbance.   Cardiovascular:  Positive for dyspnea on exertion. Negative for chest pain, claudication, irregular heartbeat, leg swelling, near-syncope, orthopnea, palpitations, paroxysmal nocturnal dyspnea and syncope.   Respiratory:  Negative for cough, hemoptysis,  shortness of breath, sleep disturbances due to breathing, snoring and wheezing.    Endocrine: Negative for cold intolerance and heat intolerance.   Hematologic/Lymphatic: Bruises/bleeds easily.   Skin:  Negative for color change, dry skin and nail changes.   Musculoskeletal:  Positive for arthritis and back pain. Negative for joint pain and myalgias.   Gastrointestinal:  Negative for bloating, abdominal pain, constipation, nausea and vomiting.   Genitourinary:  Negative for dysuria, flank pain, hematuria and hesitancy.   Neurological:  Negative for headaches, light-headedness, loss of balance, numbness, paresthesias and weakness.   Psychiatric/Behavioral:  Negative for altered mental status.    Allergic/Immunologic: Negative for environmental allergies.     BP (!) 144/60 (BP Location: Right arm, Patient Position: Sitting)     Objective:   Physical Exam  Vitals and nursing note reviewed.   Constitutional:       General: He is not in acute distress.     Appearance: Normal appearance. He is well-developed. He is not ill-appearing.   HENT:      Head: Normocephalic and atraumatic.      Nose: Nose normal.      Mouth/Throat:      Mouth: Mucous membranes are moist.   Eyes:      Pupils: Pupils are equal, round, and reactive to light.   Neck:      Thyroid: No thyromegaly.      Vascular: No JVD.      Trachea: No tracheal deviation.   Cardiovascular:      Rate and Rhythm: Normal rate and regular rhythm.      Chest Wall: PMI is not displaced.      Pulses: Intact distal pulses.           Radial pulses are 2+ on the right side and 2+ on the left side.        Dorsalis pedis pulses are 2+ on the right side and 2+ on the left side.      Heart sounds: S1 normal and S2 normal. Heart sounds not distant. No murmur heard.     Comments: Left Chest ICD site in excellent repair  No recent firing.   Pulmonary:      Effort: Pulmonary effort is normal. No respiratory distress.      Breath sounds: Normal breath sounds and air entry. No decreased  breath sounds, wheezing or rales.   Abdominal:      General: Bowel sounds are normal.      Palpations: Abdomen is soft.   Musculoskeletal:         General: No swelling. Normal range of motion.      Cervical back: Full passive range of motion without pain, normal range of motion and neck supple.      Right ankle: No swelling.      Left ankle: No swelling.   Skin:     General: Skin is warm and dry.      Capillary Refill: Capillary refill takes less than 2 seconds.      Nails: There is no clubbing.   Neurological:      General: No focal deficit present.      Mental Status: He is alert and oriented to person, place, and time.   Psychiatric:         Mood and Affect: Mood normal.         Speech: Speech normal.         Behavior: Behavior normal.         Thought Content: Thought content normal.         Judgment: Judgment normal.         Lab Results   Component Value Date    CHOL 219 (H) 09/16/2024    CHOL 221 (H) 03/12/2024    CHOL 200 (H) 03/14/2023     Lab Results   Component Value Date    HDL 31 (L) 09/16/2024    HDL 29 (L) 03/12/2024    HDL 34 (L) 03/14/2023     Lab Results   Component Value Date    LDLCALC 141.4 09/16/2024    LDLCALC 138.0 03/12/2024    LDLCALC 130.8 03/14/2023     Lab Results   Component Value Date    TRIG 233 (H) 09/16/2024    TRIG 270 (H) 03/12/2024    TRIG 176 (H) 03/14/2023     Lab Results   Component Value Date    CHOLHDL 14.2 (L) 09/16/2024    CHOLHDL 13.1 (L) 03/12/2024    CHOLHDL 17.0 (L) 03/14/2023       Chemistry        Component Value Date/Time     02/07/2025 0942    K 4.3 02/07/2025 0942     02/07/2025 0942    CO2 25 02/07/2025 0942    BUN 9 02/07/2025 0942    CREATININE 1.5 (H) 02/07/2025 0942     02/07/2025 0942        Component Value Date/Time    CALCIUM 10.6 (H) 02/07/2025 0942    ALKPHOS 47 02/07/2025 0942    AST 16 02/07/2025 0942    ALT 13 02/07/2025 0942    BILITOT 0.7 02/07/2025 0942    ESTGFRAFRICA >60.0 03/24/2022 0743    EGFRNONAA 56.4 (A) 03/24/2022 0743           Lab Results   Component Value Date    TSH 1.051 03/12/2024     Lab Results   Component Value Date    INR 1.1 01/18/2021    INR 1.1 05/14/2016    INR 1.1 01/31/2013     Lab Results   Component Value Date    WBC 5.62 02/07/2025    HGB 12.0 (L) 02/07/2025    HCT 35.9 (L) 02/07/2025    MCV 93 02/07/2025     02/07/2025      1. TTE  Results for orders placed during the hospital encounter of 02/22/24    Echo    Interpretation Summary    Left Ventricle: The left ventricle is mildly dilated. Mildly increased wall thickness. There is concentric remodeling. Normal wall motion. Septal motion is consistent with pacing. There is mildly reduced systolic function with a visually estimated ejection fraction of 45 - 50%. Grade I diastolic dysfunction.    Right Ventricle: Normal right ventricular cavity size. Wall thickness is normal. Right ventricle wall motion  is normal. Systolic function is normal. Pacemaker lead present in the ventricle.    Mitral Valve: There is mild to moderate regurgitation.    Tricuspid Valve: There is mild to moderate regurgitation.    Pulmonary Artery: The estimated pulmonary artery systolic pressure is 39 mmHg.    IVC/SVC: Intermediate venous pressure at 8 mmHg.      Assessment:      No diagnosis found.          Plan:   NICM (nonischemic cardiomyopathy)  -continue  Toprol and Losartan  -Continue routine device interrogations every 6 months  -No recent ICD firing  -Continue DASH diet,2  Gm sodium restriction  -1500 ml fluid restriction  -Monitor BP/Hr at home    Anemia in chronic illness  -Continue Iron supplement  -Follow up with Hem/Onc as scheduled    HTN  -Continue BB, ARB      RTC in 6 months with device check      CHF SYNOPSIS:    GDMT:  ACE/ARB/ARNI:  Losartan 50 mg daily  Beta Blocker: Toprol Xl 25 mg daily  MRA:  none  SGLT2: none  Diuretic:  none      Kathle May, FNP-C [facility]

## 2025-03-12 ENCOUNTER — LAB VISIT (OUTPATIENT)
Dept: LAB | Facility: HOSPITAL | Age: 85
End: 2025-03-12
Attending: FAMILY MEDICINE
Payer: MEDICARE

## 2025-03-12 DIAGNOSIS — I10 ESSENTIAL HYPERTENSION: ICD-10-CM

## 2025-03-12 LAB
ALBUMIN SERPL BCP-MCNC: 3.7 G/DL (ref 3.5–5.2)
ALP SERPL-CCNC: 46 U/L (ref 40–150)
ALT SERPL W/O P-5'-P-CCNC: 11 U/L (ref 10–44)
ANION GAP SERPL CALC-SCNC: 7 MMOL/L (ref 8–16)
AST SERPL-CCNC: 16 U/L (ref 10–40)
BASOPHILS # BLD AUTO: 0.05 K/UL (ref 0–0.2)
BASOPHILS NFR BLD: 0.8 % (ref 0–1.9)
BILIRUB SERPL-MCNC: 0.5 MG/DL (ref 0.1–1)
BUN SERPL-MCNC: 11 MG/DL (ref 8–23)
CALCIUM SERPL-MCNC: 10 MG/DL (ref 8.7–10.5)
CHLORIDE SERPL-SCNC: 108 MMOL/L (ref 95–110)
CHOLEST SERPL-MCNC: 217 MG/DL (ref 120–199)
CHOLEST/HDLC SERPL: 7.2 {RATIO} (ref 2–5)
CO2 SERPL-SCNC: 24 MMOL/L (ref 23–29)
CREAT SERPL-MCNC: 1.4 MG/DL (ref 0.5–1.4)
DIFFERENTIAL METHOD BLD: ABNORMAL
EOSINOPHIL # BLD AUTO: 0.2 K/UL (ref 0–0.5)
EOSINOPHIL NFR BLD: 3.5 % (ref 0–8)
ERYTHROCYTE [DISTWIDTH] IN BLOOD BY AUTOMATED COUNT: 12.1 % (ref 11.5–14.5)
EST. GFR  (NO RACE VARIABLE): 49.6 ML/MIN/1.73 M^2
GLUCOSE SERPL-MCNC: 101 MG/DL (ref 70–110)
HCT VFR BLD AUTO: 34 % (ref 40–54)
HDLC SERPL-MCNC: 30 MG/DL (ref 40–75)
HDLC SERPL: 13.8 % (ref 20–50)
HGB BLD-MCNC: 11.2 G/DL (ref 14–18)
IMM GRANULOCYTES # BLD AUTO: 0.02 K/UL (ref 0–0.04)
IMM GRANULOCYTES NFR BLD AUTO: 0.3 % (ref 0–0.5)
LDLC SERPL CALC-MCNC: 133.2 MG/DL (ref 63–159)
LYMPHOCYTES # BLD AUTO: 2 K/UL (ref 1–4.8)
LYMPHOCYTES NFR BLD: 30.5 % (ref 18–48)
MCH RBC QN AUTO: 31.1 PG (ref 27–31)
MCHC RBC AUTO-ENTMCNC: 32.9 G/DL (ref 32–36)
MCV RBC AUTO: 94 FL (ref 82–98)
MONOCYTES # BLD AUTO: 0.6 K/UL (ref 0.3–1)
MONOCYTES NFR BLD: 8.7 % (ref 4–15)
NEUTROPHILS # BLD AUTO: 3.7 K/UL (ref 1.8–7.7)
NEUTROPHILS NFR BLD: 56.2 % (ref 38–73)
NONHDLC SERPL-MCNC: 187 MG/DL
NRBC BLD-RTO: 0 /100 WBC
PLATELET # BLD AUTO: 263 K/UL (ref 150–450)
PMV BLD AUTO: 10 FL (ref 9.2–12.9)
POTASSIUM SERPL-SCNC: 4.5 MMOL/L (ref 3.5–5.1)
PROT SERPL-MCNC: 8.4 G/DL (ref 6–8.4)
RBC # BLD AUTO: 3.6 M/UL (ref 4.6–6.2)
SODIUM SERPL-SCNC: 139 MMOL/L (ref 136–145)
TRIGL SERPL-MCNC: 269 MG/DL (ref 30–150)
TSH SERPL DL<=0.005 MIU/L-ACNC: 1.51 UIU/ML (ref 0.4–4)
WBC # BLD AUTO: 6.56 K/UL (ref 3.9–12.7)

## 2025-03-12 PROCEDURE — 84443 ASSAY THYROID STIM HORMONE: CPT | Performed by: FAMILY MEDICINE

## 2025-03-12 PROCEDURE — 85025 COMPLETE CBC W/AUTO DIFF WBC: CPT | Performed by: FAMILY MEDICINE

## 2025-03-12 PROCEDURE — 36415 COLL VENOUS BLD VENIPUNCTURE: CPT | Mod: PO | Performed by: FAMILY MEDICINE

## 2025-03-12 PROCEDURE — 80053 COMPREHEN METABOLIC PANEL: CPT | Performed by: FAMILY MEDICINE

## 2025-03-12 PROCEDURE — 80061 LIPID PANEL: CPT | Performed by: FAMILY MEDICINE

## 2025-03-14 ENCOUNTER — RESULTS FOLLOW-UP (OUTPATIENT)
Dept: INTERNAL MEDICINE | Facility: CLINIC | Age: 85
End: 2025-03-14

## 2025-03-19 ENCOUNTER — OFFICE VISIT (OUTPATIENT)
Dept: INTERNAL MEDICINE | Facility: CLINIC | Age: 85
End: 2025-03-19
Payer: MEDICARE

## 2025-03-19 VITALS
HEIGHT: 66 IN | WEIGHT: 198.75 LBS | HEART RATE: 120 BPM | DIASTOLIC BLOOD PRESSURE: 66 MMHG | SYSTOLIC BLOOD PRESSURE: 142 MMHG | OXYGEN SATURATION: 95 % | BODY MASS INDEX: 31.94 KG/M2

## 2025-03-19 DIAGNOSIS — I50.9 CHF (NYHA CLASS III, ACC/AHA STAGE C): Chronic | ICD-10-CM

## 2025-03-19 DIAGNOSIS — I10 ESSENTIAL HYPERTENSION: ICD-10-CM

## 2025-03-19 DIAGNOSIS — M24.849 LOCKING FINGER JOINT: ICD-10-CM

## 2025-03-19 DIAGNOSIS — M19.90 ARTHRITIS: ICD-10-CM

## 2025-03-19 DIAGNOSIS — F43.23 SITUATIONAL MIXED ANXIETY AND DEPRESSIVE DISORDER: ICD-10-CM

## 2025-03-19 DIAGNOSIS — E78.2 MIXED HYPERLIPIDEMIA: ICD-10-CM

## 2025-03-19 DIAGNOSIS — Z85.46 HISTORY OF PROSTATE CANCER: ICD-10-CM

## 2025-03-19 DIAGNOSIS — D63.8 ANEMIA IN CHRONIC ILLNESS: ICD-10-CM

## 2025-03-19 DIAGNOSIS — N39.3 STRESS INCONTINENCE: ICD-10-CM

## 2025-03-19 DIAGNOSIS — H61.21 IMPACTED CERUMEN OF RIGHT EAR: ICD-10-CM

## 2025-03-19 DIAGNOSIS — Z00.00 ROUTINE GENERAL MEDICAL EXAMINATION AT A HEALTH CARE FACILITY: Primary | ICD-10-CM

## 2025-03-19 DIAGNOSIS — I77.1 TORTUOUS AORTA: ICD-10-CM

## 2025-03-19 DIAGNOSIS — N18.31 CHRONIC KIDNEY DISEASE, STAGE 3A: ICD-10-CM

## 2025-03-19 PROCEDURE — 99999 PR PBB SHADOW E&M-EST. PATIENT-LVL V: CPT | Mod: PBBFAC,,, | Performed by: PHYSICIAN ASSISTANT

## 2025-03-19 NOTE — Clinical Note
Hello! I see that the patient was treated for HLD in the past with simvastatin, but has been off since 2023. His LDL is 133, would you like us to re-start a statin? Patient asked me to reach out to his cardiology team to discuss.

## 2025-03-19 NOTE — PATIENT INSTRUCTIONS
It's time to update your Tetanus vaccination. You can do this at any pharmacy and it's good for 10 years!       You can get your RSV vaccine at the pharmacy.      The bivalent covid booster is now available. You can visit [facility] in our primary care building by the front entrance to get this vaccine.     Pharmacy hours:  Monday-Friday 8am-5:30pm

## 2025-03-19 NOTE — PROGRESS NOTES
Subjective:       Patient ID: Curtis Landry is a 84 y.o. male.    Chief Complaint: Annual Exam    CHIEF COMPLAINT:  - Curtis presents with difficulty opening and closing his third finger, which has been ongoing for a few weeks.    HPI:  - Curtis reports an issue with his third finger that has been ongoing for 2 weeks. He describes difficulty opening his hand when it is closed, particularly when the hand is in a flexed position. Sometimes the finger will not extend fully and can be painful. He has not sought treatment or done anything for this condition prior to this visit.  - He has anemia, which has been stable for a long time. He also has elevated cholesterol levels, which have been consistently high for quite a while. He was on medication for cholesterol back in 2019, but the reason for discontinuation is unclear.  - One of his ears is completely clogged with wax, which may be affecting his hearing. He did not report any pain associated with this finding.  - He denies any pain in his tongue.    MEDICATIONS:  - Cholesterol medication (unspecified), discontinued in 2019, reason unknown    MEDICAL HISTORY:  - Anemia: Long-standing  - Elevated cholesterol: Long-standing  - Prostate cancer: History of    TEST RESULTS:  - Anemia: Stable, long-standing condition  - Cholesterol: Elevated, long-standing condition  - PSA: Due for testing, to be done by cancer doctor in May           Review of Systems   Constitutional:  Negative for chills, fatigue, fever and unexpected weight change.   HENT:  Negative for congestion, dental problem, ear pain, hearing loss, rhinorrhea and trouble swallowing.    Eyes:  Negative for pain and visual disturbance.   Respiratory:  Negative for cough and shortness of breath.    Cardiovascular:  Negative for chest pain, palpitations and leg swelling.   Gastrointestinal:  Negative for abdominal distention, abdominal pain, blood in stool, constipation, diarrhea, nausea and vomiting.   Genitourinary:   "Negative for difficulty urinating.   Musculoskeletal:  Positive for arthralgias (right 3rd finger locking up). Negative for myalgias.   Skin:  Negative for rash.   Neurological:  Negative for dizziness, weakness, numbness and headaches.   Hematological:  Negative for adenopathy. Does not bruise/bleed easily.   Psychiatric/Behavioral:  Negative for agitation, dysphoric mood and sleep disturbance.        Objective:   Laboratory Reviewed ({Yes)    Lab Results   Component Value Date     03/12/2025    K 4.5 03/12/2025     03/12/2025    CO2 24 03/12/2025     03/12/2025    BUN 11 03/12/2025    CREATININE 1.4 03/12/2025    CALCIUM 10.0 03/12/2025    PROT 8.4 03/12/2025    ALBUMIN 3.7 03/12/2025    BILITOT 0.5 03/12/2025    ALKPHOS 46 03/12/2025    AST 16 03/12/2025    ALT 11 03/12/2025    EGFRNORACEVR 49.6 (A) 03/12/2025    ANIONGAP 7 (L) 03/12/2025       Lab Results   Component Value Date    CHOL 217 (H) 03/12/2025    TRIG 269 (H) 03/12/2025    HDL 30 (L) 03/12/2025    LDLCALC 133.2 03/12/2025       Lab Results   Component Value Date    WBC 6.56 03/12/2025    RBC 3.60 (L) 03/12/2025    HGB 11.2 (L) 03/12/2025    HCT 34.0 (L) 03/12/2025    MCV 94 03/12/2025     03/12/2025    GRAN 3.7 03/12/2025    GRAN 56.2 03/12/2025    LYMPH 2.0 03/12/2025    LYMPH 30.5 03/12/2025    MONO 0.6 03/12/2025    MONO 8.7 03/12/2025    EOSINOPHIL 3.5 03/12/2025    BASOPHIL 0.8 03/12/2025       Lab Results   Component Value Date    TSH 1.506 03/12/2025       Lab Results   Component Value Date    HGBA1C 5.1 02/13/2017       No results found for: "LABMICR", "CREATRANDUR", "MICALBCREAT"    No results found for: "[id number]"      Physical Exam  Vitals reviewed.   Constitutional:       General: He is not in acute distress.     Appearance: Normal appearance. He is well-developed and normal weight. He is not ill-appearing or toxic-appearing.   HENT:      Head: Normocephalic and atraumatic.      Right Ear: Ear canal and " external ear normal. There is impacted cerumen.      Left Ear: Tympanic membrane, ear canal and external ear normal.      Nose: Nose normal.      Mouth/Throat:      Mouth: Mucous membranes are dry.      Pharynx: Oropharynx is clear.   Eyes:      General: Lids are normal. No scleral icterus.     Extraocular Movements: Extraocular movements intact.      Conjunctiva/sclera: Conjunctivae normal.      Pupils: Pupils are equal, round, and reactive to light.      Comments: Uses walking still due to vision impairment   Cardiovascular:      Rate and Rhythm: Normal rate and regular rhythm.      Pulses: Normal pulses.      Heart sounds: Normal heart sounds. No murmur heard.     No friction rub. No gallop.   Pulmonary:      Effort: Pulmonary effort is normal. No respiratory distress.      Breath sounds: Normal breath sounds. No stridor. No decreased breath sounds, wheezing, rhonchi or rales.   Abdominal:      General: There is no distension.      Palpations: There is no mass.      Tenderness: There is no abdominal tenderness. There is no guarding or rebound.      Hernia: No hernia is present.   Musculoskeletal:      Cervical back: Normal range of motion. No tenderness.      Right lower leg: No edema.      Left lower leg: No edema.      Comments: Right third finger locks and pops when trying to open his hand.    Lymphadenopathy:      Cervical: No cervical adenopathy.   Skin:     General: Skin is warm and dry.   Neurological:      General: No focal deficit present.      Mental Status: He is alert and oriented to person, place, and time. Mental status is at baseline.      Cranial Nerves: No cranial nerve deficit.      Gait: Gait normal.   Psychiatric:         Mood and Affect: Mood and affect normal.         Behavior: Behavior normal.         Thought Content: Thought content normal.         Judgment: Judgment normal.         Assessment:       1. Routine general medical examination at a health care facility    2. Situational mixed  anxiety and depressive disorder    3. CHF (NYHA class III, ACC/AHA stage C)    4. Essential hypertension    5. Mixed hyperlipidemia    6. Tortuous aorta    7. Chronic kidney disease, stage 3a    8. Stress incontinence    9. Anemia in chronic illness    10. History of prostate cancer    11. Arthritis    12. Locking finger joint    13. Impacted cerumen of right ear        Plan:   1. Routine general medical examination at a health care facility    2. Situational mixed anxiety and depressive disorder  Overview:  Stable on Zoloft      3. CHF (NYHA class III, ACC/AHA stage C)  Overview:  Followed by Cardiology, continue current treatment plan       4. Essential hypertension    5. Mixed hyperlipidemia    6. Tortuous aorta  Overview:  CXR 1/2021, on ASA      7. Chronic kidney disease, stage 3a  -     Comprehensive Metabolic Panel; Future; Expected date: 09/19/2025    8. Stress incontinence    9. Anemia in chronic illness    10. History of prostate cancer  Overview:  Followed by Urology, continue current treatment plan       11. Arthritis    12. Locking finger joint  Comments:  right 3rd finger  Orders:  -     Ambulatory referral/consult to Orthopedics; Future; Expected date: 03/26/2025    13. Impacted cerumen of right ear  -     Ambulatory referral/consult to ENT; Future; Expected date: 03/26/2025          Assessment & Plan      TRIGGER FINGER (RIGHT MIDDLE FINGER):   Assessed the patient's complaint of finger stiffness, likely trigger finger.   Examined the right middle finger and confirmed trigger finger symptoms.   Noted the patient has had trigger finger symptoms for a few weeks.   Referred the patient to orthopedics for evaluation and potential injection treatment of trigger finger.   Recommend injections as a potential treatment option.    ANEMIA:   Reviewed labs, noting stable anemia.   Evaluated the anemia as long-standing and stable.   Ordered CBC for next visit in 6 months.    HYPERLIPIDEMIA:   Reviewed labs,  noting elevated cholesterol.   Evaluated cholesterol as elevated for a long time.   Considered cholesterol treatment, but deferred to cardiologist due to age and previous medication history.   Noted past medication use in 2019 and considered potential for restarting treatment.   Messaged cardiologist regarding cholesterol management.   Ordered lipid panel for next visit in 6 months.    IMPACTED CERUMEN:   Evaluated ears, noting significant wax buildup in 1 ear.   Curtis reported difficulty hearing.   Examined ears and found one ear completely clogged with wax.   Explained the risks of removal in the clinic.   Referred the patient to otolaryngology for earwax removal.    HISTORY OF PROSTATE CANCER:   Acknowledged the patient's history of prostate cancer.   Noted it's time for a PSA test but deferred to oncology.   Scheduled oncology follow up in May for cancer management.    OTHER:   Observed white coating on tongue, but denied pain.   Discussed importance of tetanus vaccination and its 10-year efficacy.   Informed about RSV vaccine and its role in preventing hospitalization.   Ordered CMP and thyroid level for next visit in 6 months.    FOLLOW-UP:   Follow up in 6 months for next visit and labs.   Nurse to schedule appointments with orthopedics and otolaryngology, aiming for same-day appointments if possible.   Explained use of voice technology for note-taking.           Health Maintenance reviewed/updated.      Check-out note:  Please follow up in 6 months for 6 month f/u In-person with Dr. Hall.  Labs: Non-fasting lab PTA  Additional orders: Print AVS, ref to ortho, ref to ENT      This note was generated with the assistance of ambient listening technology. Verbal consent was obtained by the patient and accompanying visitor(s) for the recording of patient appointment to facilitate this note. I attest to having reviewed and edited the generated note for accuracy, though some syntax or spelling errors may persist.  Please contact the author of this note for any clarification.

## 2025-03-28 DIAGNOSIS — M79.641 BILATERAL HAND PAIN: Primary | ICD-10-CM

## 2025-03-28 DIAGNOSIS — M79.642 BILATERAL HAND PAIN: Primary | ICD-10-CM

## 2025-03-31 ENCOUNTER — HOSPITAL ENCOUNTER (OUTPATIENT)
Dept: RADIOLOGY | Facility: HOSPITAL | Age: 85
Discharge: HOME | End: 2025-03-31
Attending: ORTHOPAEDIC SURGERY
Payer: MEDICARE

## 2025-03-31 ENCOUNTER — OFFICE VISIT (OUTPATIENT)
Dept: OTOLARYNGOLOGY | Facility: CLINIC | Age: 85
End: 2025-03-31
Payer: MEDICARE

## 2025-03-31 ENCOUNTER — OFFICE VISIT (OUTPATIENT)
Dept: ORTHOPEDICS | Facility: CLINIC | Age: 85
End: 2025-03-31
Payer: MEDICARE

## 2025-03-31 VITALS — WEIGHT: 198.88 LBS | HEIGHT: 66 IN | BODY MASS INDEX: 31.96 KG/M2 | BODY MASS INDEX: 32.1 KG/M2 | HEIGHT: 66 IN

## 2025-03-31 DIAGNOSIS — H61.21 IMPACTED CERUMEN OF RIGHT EAR: ICD-10-CM

## 2025-03-31 DIAGNOSIS — M79.642 BILATERAL HAND PAIN: ICD-10-CM

## 2025-03-31 DIAGNOSIS — M24.849 LOCKING FINGER JOINT: ICD-10-CM

## 2025-03-31 DIAGNOSIS — M79.641 BILATERAL HAND PAIN: ICD-10-CM

## 2025-03-31 DIAGNOSIS — T16.1XXA FOREIGN BODY OF RIGHT EAR, INITIAL ENCOUNTER: Primary | ICD-10-CM

## 2025-03-31 DIAGNOSIS — M65.30 TRIGGER FINGER, ACQUIRED: Primary | ICD-10-CM

## 2025-03-31 PROCEDURE — 99999 PR PBB SHADOW E&M-EST. PATIENT-LVL III: CPT | Mod: PBBFAC,,, | Performed by: ORTHOPAEDIC SURGERY

## 2025-03-31 PROCEDURE — 73130 X-RAY EXAM OF HAND: CPT | Mod: TC,50

## 2025-03-31 PROCEDURE — 99999 PR PBB SHADOW E&M-EST. PATIENT-LVL III: CPT | Mod: PBBFAC,,,

## 2025-03-31 PROCEDURE — 1126F AMNT PAIN NOTED NONE PRSNT: CPT | Mod: CPTII,S$GLB,,

## 2025-03-31 PROCEDURE — 73130 X-RAY EXAM OF HAND: CPT | Mod: 26,50,, | Performed by: RADIOLOGY

## 2025-03-31 PROCEDURE — 1157F ADVNC CARE PLAN IN RCRD: CPT | Mod: CPTII,S$GLB,,

## 2025-03-31 PROCEDURE — 69200 CLEAR OUTER EAR CANAL: CPT | Mod: RT,S$GLB,,

## 2025-03-31 PROCEDURE — 1101F PT FALLS ASSESS-DOCD LE1/YR: CPT | Mod: CPTII,S$GLB,,

## 2025-03-31 PROCEDURE — 3288F FALL RISK ASSESSMENT DOCD: CPT | Mod: CPTII,S$GLB,,

## 2025-03-31 PROCEDURE — 99213 OFFICE O/P EST LOW 20 MIN: CPT | Mod: 25,S$GLB,,

## 2025-03-31 RX ORDER — TRIAMCINOLONE ACETONIDE 40 MG/ML
40 INJECTION, SUSPENSION INTRA-ARTICULAR; INTRAMUSCULAR
Status: DISCONTINUED | OUTPATIENT
Start: 2025-03-31 | End: 2025-03-31 | Stop reason: HOSPADM

## 2025-03-31 RX ADMIN — TRIAMCINOLONE ACETONIDE 40 MG: 40 INJECTION, SUSPENSION INTRA-ARTICULAR; INTRAMUSCULAR at 09:03

## 2025-03-31 NOTE — PROCEDURES
Tendon Sheath    Date/Time: 3/31/2025 9:30 AM    Performed by: Carlos Durant MD  Authorized by: Carlos Durant MD    Consent Done?:  Yes (Verbal)  Indications:  Pain  Timeout: prior to procedure the correct patient, procedure, and site was verified    Prep: patient was prepped and draped in usual sterile fashion      Local anesthesia used?: Yes    Local anesthetic:  Lidocaine 2% without epinephrine  Anesthetic total (ml):  0.5    Location:  Long finger  Site:  R long flexor tendon sheath  Ultrasonic guidance for needle placement?: No    Needle size:  25 G  Approach:  Volar  Medications:  40 mg triamcinolone acetonide 40 mg/mL

## 2025-03-31 NOTE — PROGRESS NOTES
Subjective:     Patient ID: Curtis Landry is a 84 y.o. male.    Chief Complaint: Pain of the Right Hand      HPI:  The patient is an 84-year-old male who is blind and has active triggering of the right long finger.  He wishes to try injection today.    Past Medical History:   Diagnosis Date    Anemia     Angina pectoris     Arthritis     CHF (congestive heart failure)     Glaucoma (increased eye pressure)     Followed by outside opthalmology    HTN (hypertension)     Hyperlipidemia     Macular degeneration     Pneumonia     did not require hospitalization    Prostate cancer     Prostate cancer     Situational mixed anxiety and depressive disorder 10/17/2023    Urinary incontinence      Past Surgical History:   Procedure Laterality Date    APPENDECTOMY      CARDIAC DEFIBRILLATOR PLACEMENT      CARDIAC PACEMAKER PLACEMENT      CARDIAC PACEMAKER PLACEMENT      EYE SURGERY      GALLBLADDER SURGERY      PROSTATECTOMY      TOTAL HIP ARTHROPLASTY       Family History   Problem Relation Name Age of Onset    Cancer Mother Luvenia         pancreas    Cancer Father DW     No Known Problems Daughter      Diabetes Neg Hx      Heart disease Neg Hx      Hyperlipidemia Neg Hx      Hypertension Neg Hx      Kidney disease Neg Hx      Stroke Neg Hx       Social History[1]  Medication List with Changes/Refills   Current Medications    ACETAMINOPHEN (TYLENOL) 650 MG TBSR    Take 1 tablet (650 mg total) by mouth every 8 (eight) hours.    ASPIRIN (ECOTRIN) 81 MG EC TABLET    Take by mouth. 1 Tablet, Delayed Release (E.C.) Oral Every day    FERROUS GLUCONATE (FERGON) 240 (27 FE) MG TABLET    Take 240 mg by mouth every other day.    LOSARTAN (COZAAR) 50 MG TABLET    TAKE 1 TABLET BY MOUTH EVERY DAY    METOPROLOL SUCCINATE (TOPROL-XL) 25 MG 24 HR TABLET    Take 1 tablet (25 mg total) by mouth once daily.    SERTRALINE (ZOLOFT) 50 MG TABLET    TAKE 1 TABLET BY MOUTH EVERY DAY    TRAVOPROST (TRAVATAN Z) 0.004 % OPHTHALMIC SOLUTION    Inject  into the eye. 1 Drops Ophthalmic At bedtime     Review of patient's allergies indicates:  No Known Allergies  Review of Systems   Constitutional: Negative for malaise/fatigue.   HENT:  Negative for hearing loss.    Eyes:  Positive for vision loss in left eye, vision loss in right eye and visual disturbance. Negative for double vision.   Cardiovascular:  Positive for chest pain and irregular heartbeat.   Respiratory:  Negative for shortness of breath.    Endocrine: Negative for cold intolerance.   Hematologic/Lymphatic: Does not bruise/bleed easily.   Skin:  Negative for poor wound healing and suspicious lesions.   Musculoskeletal:  Positive for arthritis, joint pain and joint swelling. Negative for gout.   Gastrointestinal:  Negative for nausea and vomiting.   Genitourinary:  Positive for bladder incontinence, frequency, hesitancy, incomplete emptying and nocturia. Negative for dysuria.   Neurological:  Negative for numbness, paresthesias and sensory change.   Psychiatric/Behavioral:  Positive for depression. Negative for memory loss and substance abuse. The patient is nervous/anxious.    Allergic/Immunologic: Negative for persistent infections.       Objective:   Body mass index is 32.1 kg/m².  There were no vitals filed for this visit.             General    Constitutional: He is oriented to person, place, and time. He appears well-developed and well-nourished. No distress.   HENT:   Head: Normocephalic.   Eyes: EOM are normal.   Pulmonary/Chest: Effort normal.   Neurological: He is oriented to person, place, and time.   Psychiatric: He has a normal mood and affect.             Right Hand/Wrist Exam     Inspection   Scars: Wrist - absent Hand -  absent  Effusion: Wrist - absent Hand -  absent    Pain   Hand - The patient exhibits pain of the middle MCP.    Other     Neuorologic Exam    Median Distribution: normal  Ulnar Distribution: normal  Radial Distribution: normal    Comments:  There is active triggering  right long finger with a palpable nodule that moves with tendon excursion.          Vascular Exam       Capillary Refill  Right Hand: normal capillary refill          Relevant imaging results reviewed and interpreted by me, discussed with the patient and / or family today radiographs of both hands reviewed which showed degenerative change in various small joints particularly thumb interphalangeal joints  Assessment:     Encounter Diagnoses   Name Primary?    Locking finger joint     Trigger finger, acquired Yes    Right long finger    Plan:     The patient is injected right long trigger finger with 0.5 cc Kenalog and 0.5 cc 2% plain lidocaine under sterile technique.  He will wait at least 3 months between injections.                Disclaimer: This note was prepared using a voice recognition system and is likely to have sound alike errors within the text.          [1]   Social History  Socioeconomic History    Marital status:     Number of children: 1    Highest education level: 8th grade   Tobacco Use    Smoking status: Former     Current packs/day: 0.00     Average packs/day: 0.1 packs/day for 10.0 years (1.0 ttl pk-yrs)     Types: Cigarettes     Start date: 1970     Quit date: 1980     Years since quittin.2    Smokeless tobacco: Never   Substance and Sexual Activity    Alcohol use: No    Drug use: No    Sexual activity: Not Currently     Partners: Female     Social Drivers of Health     Financial Resource Strain: Medium Risk (2023)    Overall Financial Resource Strain (CARDIA)     Difficulty of Paying Living Expenses: Somewhat hard   Food Insecurity: No Food Insecurity (2023)    Hunger Vital Sign     Worried About Running Out of Food in the Last Year: Never true     Ran Out of Food in the Last Year: Never true   Transportation Needs: No Transportation Needs (2023)    PRAPARE - Transportation     Lack of Transportation (Medical): No     Lack of Transportation (Non-Medical):  No   Physical Activity: Unknown (12/26/2023)    Exercise Vital Sign     Days of Exercise per Week: Patient declined   Stress: Stress Concern Present (12/26/2023)    [employer] - Occupational Stress Questionnaire     Feeling of Stress : To some extent   Housing Stability: Unknown (12/26/2023)    Housing Stability Vital Sign     Unable to Pay for Housing in the Last Year: No     Unstable Housing in the Last Year: No

## 2025-03-31 NOTE — PROGRESS NOTES
Subjective:   Cerumen impactions     Patient ID: Curtis Landry is a 84 y.o. male.    Chief Complaint:  Excessive ear wax     Curtis Landry is a 84 y.o. male here to see me today for evaluation of a possible wax impaction in right ear & ear fullness.  He has complaints of hearing loss in the affected ear, but denies pain or drainage.  This has not been an issue in the past.  The patient has not been using any sort of ear drop. Admits to Q tip usage.     HPI  Review of Systems   HENT: Positive for hearing loss. Negative for ear discharge, ear pain and tinnitus.        Objective:     Physical Exam   HENT:   Right Ear: External ear and ear canal normal. Decreased hearing is noted.   Left Ear: External ear and ear canal normal.   Q tip mixed with cerumen in RIGHT ear, removal described below       PROCEDURE NOTE:  Removal of foreign body from RIGHT ear canal  Preprocedure diagnosis:  Foreign body ear canal  Postprocedure diangosis:  Same    Procedure in detail:  After verbal consent was obtained, the binocular operating microscope was used to visualize the foreign body and RIGHT ear canal.  The object was carefully grasped using an alligator forcep as well as a curette as necessary.  It was removed from the ear canal without difficulty.  The canal and tympanic membrane were then inspected, and were found to be intact with no abrasions or lacerations. The patient tolerated the procedure well, and there were no significant complications.            Assessment:     1. Foreign body of right ear, initial encounter    2. Impacted cerumen of right ear        Plan:     1.  Foreign body - Removed today without difficulty.  I instructed the patient to avoid Qtips.

## 2025-05-23 ENCOUNTER — LAB VISIT (OUTPATIENT)
Dept: LAB | Facility: HOSPITAL | Age: 85
End: 2025-05-23
Attending: NURSE PRACTITIONER
Payer: MEDICARE

## 2025-05-23 DIAGNOSIS — D47.2 MGUS (MONOCLONAL GAMMOPATHY OF UNKNOWN SIGNIFICANCE): ICD-10-CM

## 2025-05-23 DIAGNOSIS — D64.9 ANEMIA, UNSPECIFIED TYPE: ICD-10-CM

## 2025-05-23 DIAGNOSIS — D47.2 SMOLDERING MYELOMA: ICD-10-CM

## 2025-05-23 LAB
ABSOLUTE EOSINOPHIL (OHS): 0.14 K/UL
ABSOLUTE MONOCYTE (OHS): 0.57 K/UL (ref 0.3–1)
ABSOLUTE NEUTROPHIL COUNT (OHS): 3.93 K/UL (ref 1.8–7.7)
ALBUMIN SERPL BCP-MCNC: 3.8 G/DL (ref 3.5–5.2)
ALP SERPL-CCNC: 54 UNIT/L (ref 40–150)
ALT SERPL W/O P-5'-P-CCNC: 14 UNIT/L (ref 10–44)
ANION GAP (OHS): 8 MMOL/L (ref 8–16)
AST SERPL-CCNC: 21 UNIT/L (ref 11–45)
BASOPHILS # BLD AUTO: 0.04 K/UL
BASOPHILS NFR BLD AUTO: 0.6 %
BETA 2 MICROGLOBILIN (OHS): 3.1 UG/ML (ref 0–2.5)
BILIRUB SERPL-MCNC: 0.6 MG/DL (ref 0.1–1)
BUN SERPL-MCNC: 11 MG/DL (ref 8–23)
CALCIUM SERPL-MCNC: 9.9 MG/DL (ref 8.7–10.5)
CHLORIDE SERPL-SCNC: 103 MMOL/L (ref 95–110)
CO2 SERPL-SCNC: 26 MMOL/L (ref 23–29)
CREAT SERPL-MCNC: 1.4 MG/DL (ref 0.5–1.4)
ERYTHROCYTE [DISTWIDTH] IN BLOOD BY AUTOMATED COUNT: 12.8 % (ref 11.5–14.5)
GFR SERPLBLD CREATININE-BSD FMLA CKD-EPI: 50 ML/MIN/1.73/M2
GLUCOSE SERPL-MCNC: 96 MG/DL (ref 70–110)
HCT VFR BLD AUTO: 33 % (ref 40–54)
HGB BLD-MCNC: 11 GM/DL (ref 14–18)
IGA SERPL-MCNC: 159 MG/DL (ref 40–350)
IGG SERPL-MCNC: 2315 MG/DL (ref 650–1600)
IGM SERPL-MCNC: 43 MG/DL (ref 50–300)
IMM GRANULOCYTES # BLD AUTO: 0.01 K/UL (ref 0–0.04)
IMM GRANULOCYTES NFR BLD AUTO: 0.1 % (ref 0–0.5)
LDH SERPL-CCNC: 156 U/L (ref 110–260)
LYMPHOCYTES # BLD AUTO: 2.05 K/UL (ref 1–4.8)
MCH RBC QN AUTO: 31.5 PG (ref 27–31)
MCHC RBC AUTO-ENTMCNC: 33.3 G/DL (ref 32–36)
MCV RBC AUTO: 95 FL (ref 82–98)
NUCLEATED RBC (/100WBC) (OHS): 0 /100 WBC
PLATELET # BLD AUTO: 272 K/UL (ref 150–450)
PMV BLD AUTO: 9.5 FL (ref 9.2–12.9)
POTASSIUM SERPL-SCNC: 4.4 MMOL/L (ref 3.5–5.1)
PROT SERPL-MCNC: 8.6 GM/DL (ref 6–8.4)
RBC # BLD AUTO: 3.49 M/UL (ref 4.6–6.2)
RELATIVE EOSINOPHIL (OHS): 2.1 %
RELATIVE LYMPHOCYTE (OHS): 30.4 % (ref 18–48)
RELATIVE MONOCYTE (OHS): 8.5 % (ref 4–15)
RELATIVE NEUTROPHIL (OHS): 58.3 % (ref 38–73)
SODIUM SERPL-SCNC: 137 MMOL/L (ref 136–145)
WBC # BLD AUTO: 6.74 K/UL (ref 3.9–12.7)

## 2025-05-23 PROCEDURE — 85025 COMPLETE CBC W/AUTO DIFF WBC: CPT

## 2025-05-23 PROCEDURE — 82784 ASSAY IGA/IGD/IGG/IGM EACH: CPT

## 2025-05-23 PROCEDURE — 84165 PROTEIN E-PHORESIS SERUM: CPT

## 2025-05-23 PROCEDURE — 86334 IMMUNOFIX E-PHORESIS SERUM: CPT

## 2025-05-23 PROCEDURE — 82232 ASSAY OF BETA-2 PROTEIN: CPT

## 2025-05-23 PROCEDURE — 36415 COLL VENOUS BLD VENIPUNCTURE: CPT | Mod: PO

## 2025-05-23 PROCEDURE — 80053 COMPREHEN METABOLIC PANEL: CPT

## 2025-05-23 PROCEDURE — 83615 LACTATE (LD) (LDH) ENZYME: CPT

## 2025-05-23 PROCEDURE — 83521 IG LIGHT CHAINS FREE EACH: CPT

## 2025-05-26 LAB
ALBUMIN, SPE (OHS): 4.19 G/DL (ref 3.35–5.55)
ALPHA 1 GLOB (OHS): 0.37 GM/DL (ref 0.17–0.41)
ALPHA 2 GLOB (OHS): 0.96 GM/DL (ref 0.43–0.99)
BETA GLOB (OHS): 0.76 GM/DL (ref 0.5–1.1)
GAMMA GLOBULIN (OHS): 1.92 GM/DL (ref 0.67–1.58)
KAPPA LC FREE SER-MCNC: 0.51 MG/L (ref 0.26–1.65)
KAPPA LC FREE/LAMBDA FREE SER: 2.56 MG/DL (ref 0.33–1.94)
LAMBDA LC FREE SERPL-MCNC: 4.98 MG/DL (ref 0.57–2.63)
PATHOLOGIST INTERPRETATION - IFE SERUM (OHS): NORMAL
PATHOLOGIST REVIEW - SPE (OHS): NORMAL
PROT SERPL-MCNC: 8.2 GM/DL (ref 6–8.4)

## 2025-05-28 ENCOUNTER — OFFICE VISIT (OUTPATIENT)
Dept: HEMATOLOGY/ONCOLOGY | Facility: CLINIC | Age: 85
End: 2025-05-28
Payer: MEDICARE

## 2025-05-28 VITALS
HEIGHT: 66 IN | DIASTOLIC BLOOD PRESSURE: 71 MMHG | OXYGEN SATURATION: 95 % | TEMPERATURE: 97 F | HEART RATE: 77 BPM | WEIGHT: 197 LBS | BODY MASS INDEX: 31.66 KG/M2 | SYSTOLIC BLOOD PRESSURE: 124 MMHG

## 2025-05-28 DIAGNOSIS — D47.2 SMOLDERING MYELOMA: ICD-10-CM

## 2025-05-28 DIAGNOSIS — D63.8 ANEMIA IN CHRONIC ILLNESS: ICD-10-CM

## 2025-05-28 DIAGNOSIS — D53.9 NUTRITIONAL ANEMIA, UNSPECIFIED: ICD-10-CM

## 2025-05-28 DIAGNOSIS — D47.2 MGUS (MONOCLONAL GAMMOPATHY OF UNKNOWN SIGNIFICANCE): Primary | ICD-10-CM

## 2025-05-28 DIAGNOSIS — D64.9 ANEMIA, UNSPECIFIED TYPE: ICD-10-CM

## 2025-05-28 PROCEDURE — 99999 PR PBB SHADOW E&M-EST. PATIENT-LVL III: CPT | Mod: PBBFAC,,, | Performed by: NURSE PRACTITIONER

## 2025-05-28 RX ORDER — VITAMIN B COMPLEX
1 CAPSULE ORAL DAILY
Qty: 90 CAPSULE | Refills: 1 | Status: SHIPPED | OUTPATIENT
Start: 2025-05-28

## 2025-05-28 NOTE — ASSESSMENT & PLAN NOTE
[facility]US labs remain stable. Baseline anemia unchanged. Paraproteinemia stable.     For now will continue to monitor. We discussed possible repeat bmbx in the future but patient and family unsure if they would want to proceed with repeat biopsy. F/u 4 months with repeat labs prior. Discussed S&S to report sooner

## 2025-05-28 NOTE — PROGRESS NOTES
Subjective:       Patient ID: Curtis Landry is a 84 y.o. male.    Chief Complaint: Review labs. H/o MGUS    HPI: 84 y.o male presenting today for follow up of his previously diagnosed MGUS, chronic anemia. This a former patient of Dr. La who has a hx of chronic anemia and a MGUS of many years duration. He had a bone marrow in  that failed to show myeloma or other abnormalities. Of note he has a hx of prostate cancer s/p prostatectomy 2001.    He feels well and is without complaints. He denies any anemia symptoms or B symptoms. Reports doing well since most recent visit. He presents accompanied by a family member. Denies interval clinical concerns.   Social History     Socioeconomic History    Marital status:     Number of children: 1    Highest education level: 8th grade   Tobacco Use    Smoking status: Former     Current packs/day: 0.00     Average packs/day: 0.1 packs/day for 10.0 years (1.0 ttl pk-yrs)     Types: Cigarettes     Start date: 1970     Quit date: 1980     Years since quittin.4    Smokeless tobacco: Never   Substance and Sexual Activity    Alcohol use: No    Drug use: No    Sexual activity: Not Currently     Partners: Female     Social Drivers of Health     Financial Resource Strain: Medium Risk (2023)    Overall Financial Resource Strain (CARDIA)     Difficulty of Paying Living Expenses: Somewhat hard   Food Insecurity: No Food Insecurity (2023)    Hunger Vital Sign     Worried About Running Out of Food in the Last Year: Never true     Ran Out of Food in the Last Year: Never true   Transportation Needs: No Transportation Needs (2023)    PRAPARE - Transportation     Lack of Transportation (Medical): No     Lack of Transportation (Non-Medical): No   Physical Activity: Unknown (2023)    Exercise Vital Sign     Days of Exercise per Week: Patient declined   Stress: Stress Concern Present (2023)    [facility] [place] [facility]  Occupational Health - Occupational Stress Questionnaire     Feeling of Stress : To some extent   Housing Stability: Unknown (12/26/2023)    Housing Stability Vital Sign     Unable to Pay for Housing in the Last Year: No     Unstable Housing in the Last Year: No       Past Medical History:   Diagnosis Date    Anemia     Angina pectoris     Arthritis     CHF (congestive heart failure)     Glaucoma (increased eye pressure)     Followed by outside opthalmology    HTN (hypertension)     Hyperlipidemia     Macular degeneration     Pneumonia     did not require hospitalization    Prostate cancer     Prostate cancer     Situational mixed anxiety and depressive disorder 10/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    Urinary incontinence        Family History   Problem Relation Name Age of Onset    Cancer Mother Luvenia         pancreas    Cancer Father DW     No Known Problems Daughter      Diabetes Neg Hx      Heart disease Neg Hx      Hyperlipidemia Neg Hx      Hypertension Neg Hx      Kidney disease Neg Hx      Stroke Neg Hx         Past Surgical History:   Procedure Laterality Date    APPENDECTOMY      CARDIAC DEFIBRILLATOR PLACEMENT      CARDIAC PACEMAKER PLACEMENT      CARDIAC PACEMAKER PLACEMENT      EYE SURGERY      GALLBLADDER SURGERY      PROSTATECTOMY      TOTAL HIP ARTHROPLASTY         Review of Systems   Constitutional:  Negative for activity change, appetite change, chills, fatigue, fever and unexpected weight change.   HENT:  Negative for congestion, mouth sores, nosebleeds, sore throat, trouble swallowing and voice change.    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   Respiratory:  Negative for cough, chest tightness, shortness of breath and wheezing.    Cardiovascular:  Negative for chest pain and leg swelling.   Gastrointestinal:  Negative for abdominal distention, abdominal pain, blood in stool, constipation, diarrhea, nausea and vomiting.   Genitourinary:  Negative for difficulty  urinating, dysuria and hematuria.   Musculoskeletal:  Positive for gait problem (utilizes cane). Negative for arthralgias, back pain and myalgias.   Skin:  Negative for pallor, rash and wound.   Neurological:  Negative for dizziness, syncope, weakness and headaches.   Hematological:  Negative for adenopathy. Does not bruise/bleed easily.   Psychiatric/Behavioral:  The patient is not nervous/anxious.          Medication List with Changes/Refills   New Medications    B COMPLEX VITAMINS CAPSULE    Take 1 capsule by mouth once daily.   Current Medications    ACETAMINOPHEN (TYLENOL) 650 MG TBSR    Take 1 tablet (650 mg total) by mouth every 8 (eight) hours.    ASPIRIN (ECOTRIN) 81 MG EC TABLET    Take by mouth. 1 Tablet, Delayed Release (E.C.) Oral Every day    FERROUS GLUCONATE (FERGON) 240 (27 FE) MG TABLET    Take 240 mg by mouth every other day.    LOSARTAN (COZAAR) 50 MG TABLET    TAKE 1 TABLET BY MOUTH EVERY DAY    METOPROLOL SUCCINATE (TOPROL-XL) 25 MG 24 HR TABLET    Take 1 tablet (25 mg total) by mouth once daily.    SERTRALINE (ZOLOFT) 50 MG TABLET    TAKE 1 TABLET BY MOUTH EVERY DAY    TRAVOPROST (TRAVATAN Z) 0.004 % OPHTHALMIC SOLUTION    Inject into the eye. 1 Drops Ophthalmic At bedtime     Objective:     Vitals:    05/28/25 1048   BP: 124/71   Pulse: 77   Temp: 97 °F (36.1 °C)       Lab Results   Component Value Date    WBC 6.74 05/23/2025    HGB 11.0 (L) 05/23/2025    HCT 33.0 (L) 05/23/2025    MCV 95 05/23/2025     05/23/2025       BMP  Lab Results   Component Value Date     05/23/2025    K 4.4 05/23/2025     05/23/2025    CO2 26 05/23/2025    BUN 11 05/23/2025    CREATININE 1.4 05/23/2025    CALCIUM 9.9 05/23/2025    ANIONGAP 8 05/23/2025    EGFRNORACEVR 50 (L) 05/23/2025     Lab Results   Component Value Date    ALT 14 05/23/2025    AST 21 05/23/2025    ALKPHOS 54 05/23/2025    BILITOT 0.6 05/23/2025     Lab Results   Component Value Date    UIBC 191 04/09/2012    IRON 71 02/07/2025     TRANSFERRIN 244 02/07/2025    TIBC 361 02/07/2025    FESATURATED 20 02/07/2025          Physical Exam  HENT:      Right Ear: External ear normal.      Left Ear: External ear normal.   Eyes:      Conjunctiva/sclera: Conjunctivae normal.   Cardiovascular:      Rate and Rhythm: Normal rate.   Pulmonary:      Effort: Pulmonary effort is normal. No respiratory distress.      Breath sounds: Normal breath sounds.   Musculoskeletal:         General: Normal range of motion.      Cervical back: Normal range of motion.   Lymphadenopathy:      Head:      Right side of head: No submandibular, preauricular or posterior auricular adenopathy.      Left side of head: No submandibular, preauricular or posterior auricular adenopathy.      Cervical:      Right cervical: No superficial cervical adenopathy.     Left cervical: No superficial cervical adenopathy.   Neurological:      Mental Status: He is alert and oriented to person, place, and time.        Assessment:     Problem List Items Addressed This Visit          Oncology    Anemia in chronic illness    Stable. Monitor          MGUS (monoclonal gammopathy of unknown significance) - Primary    MGUS labs remain stable. Baseline anemia unchanged. Paraproteinemia stable.     For now will continue to monitor. We discussed possible repeat bmbx in the future but patient and family unsure if they would want to proceed with repeat biopsy. F/u 4 months with repeat labs prior. Discussed S&S to report sooner         Relevant Orders    CBC Auto Differential    Comprehensive Metabolic Panel    Beta 2 Microglobulin, Serum    Immunofixation, Serum    Immunoglobulin Free LT Chains Blood    Immunoglobulins (IgG, IgA, IgM) Quantitative    Lactate Dehydrogenase    Protein Electrophoresis, Serum    Vitamin B12    Folate     Other Visit Diagnoses         Anemia, unspecified type        Relevant Orders    CBC Auto Differential    Comprehensive Metabolic Panel    Beta 2 Microglobulin, Serum     Immunofixation, Serum    Immunoglobulin Free LT Chains Blood    Immunoglobulins (IgG, IgA, IgM) Quantitative    Lactate Dehydrogenase    Protein Electrophoresis, Serum    Vitamin B12    Folate      Smoldering myeloma        Relevant Orders    CBC Auto Differential    Comprehensive Metabolic Panel    Beta 2 Microglobulin, Serum    Immunofixation, Serum    Immunoglobulin Free LT Chains Blood    Immunoglobulins (IgG, IgA, IgM) Quantitative    Lactate Dehydrogenase    Protein Electrophoresis, Serum    Vitamin B12    Folate      Nutritional anemia, unspecified        Relevant Medications    b complex vitamins capsule    Other Relevant Orders    Vitamin B12    Folate            Route Chart for Scheduling    Med Onc Chart Routing      Follow up with physician    Follow up with FIORELLA 4 months.   Infusion scheduling note    Injection scheduling note    Labs CBC, CMP, free light chains, LDH, SPEP and other   Scheduling:  Preferred lab:  Lab interval:  1 week prior   Imaging None      Pharmacy appointment No pharmacy appointment needed      Other referrals No nutrition appointment needed -        No additional referrals needed            Plan:     MGUS (monoclonal gammopathy of unknown significance)  -     CBC Auto Differential; Future; Expected date: 05/28/2025  -     Comprehensive Metabolic Panel; Future; Expected date: 05/28/2025  -     Beta 2 Microglobulin, Serum; Future; Expected date: 05/28/2025  -     Immunofixation, Serum; Future; Expected date: 05/28/2025  -     Immunoglobulin Free LT Chains Blood; Future; Expected date: 05/28/2025  -     Immunoglobulins (IgG, IgA, IgM) Quantitative; Future; Expected date: 05/28/2025  -     Lactate Dehydrogenase; Future; Expected date: 05/28/2025  -     Protein Electrophoresis, Serum; Future; Expected date: 05/28/2025  -     Vitamin B12; Future; Expected date: 05/28/2025  -     Folate; Future; Expected date: 05/28/2025    Anemia, unspecified type  -     CBC Auto Differential; Future;  Expected date: 05/28/2025  -     Comprehensive Metabolic Panel; Future; Expected date: 05/28/2025  -     Beta 2 Microglobulin, Serum; Future; Expected date: 05/28/2025  -     Immunofixation, Serum; Future; Expected date: 05/28/2025  -     Immunoglobulin Free LT Chains Blood; Future; Expected date: 05/28/2025  -     Immunoglobulins (IgG, IgA, IgM) Quantitative; Future; Expected date: 05/28/2025  -     Lactate Dehydrogenase; Future; Expected date: 05/28/2025  -     Protein Electrophoresis, Serum; Future; Expected date: 05/28/2025  -     Vitamin B12; Future; Expected date: 05/28/2025  -     Folate; Future; Expected date: 05/28/2025    Smoldering myeloma  -     CBC Auto Differential; Future; Expected date: 05/28/2025  -     Comprehensive Metabolic Panel; Future; Expected date: 05/28/2025  -     Beta 2 Microglobulin, Serum; Future; Expected date: 05/28/2025  -     Immunofixation, Serum; Future; Expected date: 05/28/2025  -     Immunoglobulin Free LT Chains Blood; Future; Expected date: 05/28/2025  -     Immunoglobulins (IgG, IgA, IgM) Quantitative; Future; Expected date: 05/28/2025  -     Lactate Dehydrogenase; Future; Expected date: 05/28/2025  -     Protein Electrophoresis, Serum; Future; Expected date: 05/28/2025  -     Vitamin B12; Future; Expected date: 05/28/2025  -     Folate; Future; Expected date: 05/28/2025    Nutritional anemia, unspecified  -     Vitamin B12; Future; Expected date: 05/28/2025  -     Folate; Future; Expected date: 05/28/2025  -     b complex vitamins capsule; Take 1 capsule by mouth once daily.  Dispense: 90 capsule; Refill: 1    Anemia in chronic illness            CONY Finnegan

## 2025-06-02 ENCOUNTER — TELEPHONE (OUTPATIENT)
Dept: INTERNAL MEDICINE | Facility: CLINIC | Age: 85
End: 2025-06-02
Payer: MEDICARE

## 2025-06-25 DIAGNOSIS — I10 ESSENTIAL HYPERTENSION: ICD-10-CM

## 2025-06-25 DIAGNOSIS — I50.9 CHF (NYHA CLASS III, ACC/AHA STAGE C): Chronic | ICD-10-CM

## 2025-06-25 RX ORDER — LOSARTAN POTASSIUM 50 MG/1
50 TABLET ORAL
Qty: 90 TABLET | Refills: 3 | Status: SHIPPED | OUTPATIENT
Start: 2025-06-25

## 2025-07-11 ENCOUNTER — TELEPHONE (OUTPATIENT)
Dept: INTERNAL MEDICINE | Facility: CLINIC | Age: 85
End: 2025-07-11
Payer: MEDICARE

## 2025-07-11 NOTE — TELEPHONE ENCOUNTER
Copied from CRM #[id number]. Topic: General Inquiry - Patient Advice  >> Jul 11, 2025  2:41 PM [place] wrote:  Type:  Patient Returning Call    Who Called:  Who Left Message for Patient:  Does the patient know what this is regarding?:office visit  Would the patient rather a call back or a response via [facility]? Callback  Best Call Back Number:[phone]  Additional Information: Want to speak with office concerning approved amount for MC

## 2025-08-25 ENCOUNTER — OFFICE VISIT (OUTPATIENT)
Dept: CARDIOLOGY | Facility: CLINIC | Age: 85
End: 2025-08-25
Payer: MEDICARE

## 2025-08-25 ENCOUNTER — HOSPITAL ENCOUNTER (OUTPATIENT)
Dept: CARDIOLOGY | Facility: HOSPITAL | Age: 85
Discharge: HOME | End: 2025-08-25
Attending: NURSE PRACTITIONER
Payer: MEDICARE

## 2025-08-25 VITALS
OXYGEN SATURATION: 97 % | HEART RATE: 78 BPM | SYSTOLIC BLOOD PRESSURE: 142 MMHG | DIASTOLIC BLOOD PRESSURE: 92 MMHG | BODY MASS INDEX: 32.49 KG/M2 | WEIGHT: 201.25 LBS

## 2025-08-25 DIAGNOSIS — I50.9 CHF (NYHA CLASS III, ACC/AHA STAGE C): ICD-10-CM

## 2025-08-25 DIAGNOSIS — I50.9 CHF (NYHA CLASS III, ACC/AHA STAGE C): Chronic | ICD-10-CM

## 2025-08-25 DIAGNOSIS — I42.8 NICM (NONISCHEMIC CARDIOMYOPATHY): ICD-10-CM

## 2025-08-25 DIAGNOSIS — N18.31 CHRONIC KIDNEY DISEASE, STAGE 3A: ICD-10-CM

## 2025-08-25 DIAGNOSIS — R07.9 CHEST PAIN, UNSPECIFIED TYPE: Primary | ICD-10-CM

## 2025-08-25 DIAGNOSIS — I25.5 ISCHEMIC CARDIOMYOPATHY: ICD-10-CM

## 2025-08-25 DIAGNOSIS — Z95.810 ICD (IMPLANTABLE CARDIOVERTER-DEFIBRILLATOR) IN PLACE: ICD-10-CM

## 2025-08-25 PROCEDURE — 93283 PRGRMG EVAL IMPLANTABLE DFB: CPT

## 2025-08-25 PROCEDURE — 99999 PR PBB SHADOW E&M-EST. PATIENT-LVL III: CPT | Mod: PBBFAC,,, | Performed by: NURSE PRACTITIONER

## 2025-08-25 PROCEDURE — 99213 OFFICE O/P EST LOW 20 MIN: CPT | Mod: PBBFAC | Performed by: NURSE PRACTITIONER

## 2025-08-25 PROCEDURE — 99215 OFFICE O/P EST HI 40 MIN: CPT | Mod: S$PBB,,, | Performed by: NURSE PRACTITIONER

## 2025-08-25 RX ORDER — DORZOLAMIDE HYDROCHLORIDE AND TIMOLOL MALEATE 20; 5 MG/ML; MG/ML
1 SOLUTION/ DROPS OPHTHALMIC EVERY MORNING
COMMUNITY
Start: 2025-07-31

## 2025-08-30 LAB
OHS CV AF BURDEN PERCENT: < 1
OHS CV DC REMOTE DEVICE TYPE: NORMAL
OHS CV RV PACING PERCENT: 1 %